# Patient Record
Sex: FEMALE | Race: WHITE | NOT HISPANIC OR LATINO | Employment: FULL TIME | URBAN - METROPOLITAN AREA
[De-identification: names, ages, dates, MRNs, and addresses within clinical notes are randomized per-mention and may not be internally consistent; named-entity substitution may affect disease eponyms.]

---

## 2017-01-09 ENCOUNTER — THERAPY VISIT (OUTPATIENT)
Dept: PHYSICAL THERAPY | Facility: CLINIC | Age: 57
End: 2017-01-09
Payer: COMMERCIAL

## 2017-01-09 DIAGNOSIS — M25.561 CHRONIC PAIN OF RIGHT KNEE: Primary | ICD-10-CM

## 2017-01-09 DIAGNOSIS — G89.29 CHRONIC PAIN OF RIGHT KNEE: Primary | ICD-10-CM

## 2017-01-09 PROCEDURE — 97110 THERAPEUTIC EXERCISES: CPT | Mod: GP | Performed by: PHYSICAL THERAPIST

## 2017-01-09 PROCEDURE — 97161 PT EVAL LOW COMPLEX 20 MIN: CPT | Mod: GP | Performed by: PHYSICAL THERAPIST

## 2017-01-09 NOTE — PROGRESS NOTES
"Subjective:    Rupal Puri is a 56 year old female with a right knee condition.  Condition occurred with:  Repetition/overuse.  Condition occurred: in the community.  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 Pt has noted increased pain 12-16..        Pain is described as sharp  and reported as 2/10.  Associated symptoms:  Loss of strength (clicking). Pain is the same all the time.  Symptoms are exacerbated by kneeling, bending/squatting, ascending stairs, descending stairs and transfers and relieved by rest.  Since onset symptoms are unchanged.  Special tests:  X-ray (pt reports x-rays negative).  Previous treatment: none to the knee.    General health as reported by patient is good.  Pertinent medical history includes:  Overweight, migraines and rheumatoid arthritis.  Medical allergies: no.  Other surgeries include:  Orthopedic surgery (knees).  Medication history: maxalt.  Current occupation is .  Patient is working in normal job without restrictions.  Primary job tasks include:  Prolonged sitting.        Red flags:  None as reported by the patient.                      Objective:          Flexibility/Screens:       Lower Extremity:      Decreased right lower extremity flexibility:  IT Band                                                      Knee Evaluation:  ROM:  Strength wnl knee: weak pelvic stabilizers.  AROM    Hyperextension: Left:     Right: 0  Extension: Left:    Right:  0  Flexion: Left:   Right: 120  PROM    Hyperextension: Left:   Right:  0  Extension: Left:   Right:  0  Flexion: Left:   Right:  130      Strength:     Extension:  Right: 4/5   Pain:  Flexion:  Right: 5/5   Pain:              Palpation:  Palpation of knee: point tenderness distal ITB.                General     ROS    Assessment/Plan:      Patient is a 56 year old female with right side knee complaints.    Patient has the following " significant findings with corresponding treatment plan.                Diagnosis 1:  Right knee pain / ITB syndrome  Pain -  hot/cold therapy, manual therapy, self management, education and home program  Decreased ROM/flexibility - manual therapy, therapeutic exercise, therapeutic activity and home program  Decreased strength - therapeutic exercise, therapeutic activities and home program    Therapy Evaluation Codes:   1) History comprised of:   Personal factors that impact the plan of care:      Time since onset of symptoms.    Comorbidity factors that impact the plan of care are:      Migraines/headaches.     Medications impacting care: migraine medication.  2) Examination of Body Systems comprised of:   Body structures and functions that impact the plan of care:      Hip and Knee.   Activity limitations that impact the plan of care are:      Driving, Squatting/kneeling and Standing.  3) Clinical presentation characteristics are:   Stable/Uncomplicated.  4) Decision-Making    Low complexity using standardized patient assessment instrument and/or measureable assessment of functional outcome.  Cumulative Therapy Evaluation is: Low complexity.    Previous and current functional limitations:  (See Goal Flow Sheet for this information)    Short term and Long term goals: (See Goal Flow Sheet for this information)     Communication ability:  Patient appears to be able to clearly communicate and understand verbal and written communication and follow directions correctly.  Treatment Explanation - The following has been discussed with the patient:   RX ordered/plan of care  Anticipated outcomes  Possible risks and side effects  This patient would benefit from PT intervention to resume normal activities.   Rehab potential is good.    Frequency:  1 X week, once daily  Duration:  for 6 weeks  Discharge Plan:  Achieve all LTG.  Independent in home treatment program.  Reach maximal therapeutic benefit.    Please refer to the  daily flowsheet for treatment today, total treatment time and time spent performing 1:1 timed codes.

## 2017-01-09 NOTE — Clinical Note
"[facility]  [address] [facility] [address]  [facility] [address]  [phone]    2017    Re: Rupal Puri   :   1960  MRN:  [id number]   REFERRING PHYSICIAN:   Simon KUMARI PT  Date of Initial Evaluation:  17  Visits:  Rxs Used: 1  Reason for Referral:  Chronic pain of right knee    EVALUATION SUMMARY    Subjective:    Rupal Puri is a 56 year old female with a right knee condition.  Condition occurred with:  Repetition/overuse.  Condition occurred: in the community.  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 Pt has noted increased pain 12-16..      Pain is described as sharp  and reported as 2/10.  Associated symptoms:  Loss of strength (clicking). Pain is the same all the time.  Symptoms are exacerbated by kneeling, bending/squatting, ascending stairs, descending stairs and transfers and relieved by rest.  Since onset symptoms are unchanged.  Special tests:  X-ray (pt reports x-rays negative).  Previous treatment: none to the knee.    General health as reported by patient is good.  Pertinent medical history includes:  Overweight, migraines and rheumatoid arthritis.  Medical allergies: no.  Other surgeries include:  Orthopedic surgery (knees).  Medication history: maxalt.  Current occupation is .  Patient is working in normal job without restrictions.  Primary job tasks include:  Prolonged sitting.  Red flags:  None as reported by the patient.                Objective:  Flexibility/Screens:   Lower Extremity:  Decreased right lower extremity flexibility:  IT Band    Knee Evaluation:  ROM:  Strength wnl knee: weak pelvic stabilizers.  AROM  Hyperextension: Left:     Right: 0  Extension: Left:    Right:  0  Flexion: Left:   Right: 120  PROM  Hyperextension: Left:   Right:  0  Extension: Left:   Right:  0  Flexion: Left:   Right:  130  Strength: "   Extension:  Right: 4/5   Pain:  Flexion:  Right: 5/5   Pain:    Palpation:  Palpation of knee: point tenderness distal ITB.      Re: Rupal Puri   :   1960    Assessment/Plan:      Patient is a 56 year old female with right side knee complaints.    Patient has the following significant findings with corresponding treatment plan.                Diagnosis 1:  Right knee pain / ITB syndrome  Pain -  hot/cold therapy, manual therapy, self management, education and home program  Decreased ROM/flexibility - manual therapy, therapeutic exercise, therapeutic activity and home program  Decreased strength - therapeutic exercise, therapeutic activities and home program    Therapy Evaluation Codes:   1) History comprised of:   Personal factors that impact the plan of care:      Time since onset of symptoms.    Comorbidity factors that impact the plan of care are:      Migraines/headaches.     Medications impacting care: migraine medication.  2) Examination of Body Systems comprised of:   Body structures and functions that impact the plan of care:      Hip and Knee.   Activity limitations that impact the plan of care are:      Driving, Squatting/kneeling and Standing.  3) Clinical presentation characteristics are:   Stable/Uncomplicated.  4) Decision-Making    Low complexity using standardized patient assessment instrument and/or measureable assessment of functional outcome.  Cumulative Therapy Evaluation is: Low complexity.    Previous and current functional limitations:  (See Goal Flow Sheet for this information)    Short term and Long term goals: (See Goal Flow Sheet for this information)   Communication ability:  Patient appears to be able to clearly communicate and understand verbal and written communication and follow directions correctly.  Treatment Explanation - The following has been discussed with the patient:   RX ordered/plan of care  Anticipated outcomes  Possible risks and side effects  This patient  would benefit from PT intervention to resume normal activities.   Rehab potential is good.  Frequency:  1 X week, once daily  Duration:  for 6 weeks  Discharge Plan:  Achieve all LTG.  Independent in home treatment program.  Reach maximal therapeutic benefit.    Thank you for your referral.  INQUIRIES  Therapist:  Lakhwinder Galindo, PT  [facility]  [address] [address] [address]  Phone: [phone]/Fax: [phone]

## 2017-01-17 ENCOUNTER — THERAPY VISIT (OUTPATIENT)
Dept: PHYSICAL THERAPY | Facility: CLINIC | Age: 57
End: 2017-01-17
Payer: COMMERCIAL

## 2017-01-17 DIAGNOSIS — M25.561 CHRONIC PAIN OF RIGHT KNEE: Primary | ICD-10-CM

## 2017-01-17 DIAGNOSIS — G89.29 CHRONIC PAIN OF RIGHT KNEE: Primary | ICD-10-CM

## 2017-01-17 PROCEDURE — 97530 THERAPEUTIC ACTIVITIES: CPT | Mod: GP | Performed by: PHYSICAL THERAPIST

## 2017-01-17 PROCEDURE — 97110 THERAPEUTIC EXERCISES: CPT | Mod: GP | Performed by: PHYSICAL THERAPIST

## 2017-01-24 ENCOUNTER — THERAPY VISIT (OUTPATIENT)
Dept: PHYSICAL THERAPY | Facility: CLINIC | Age: 57
End: 2017-01-24
Payer: COMMERCIAL

## 2017-01-24 DIAGNOSIS — G89.29 CHRONIC PAIN OF RIGHT KNEE: Primary | ICD-10-CM

## 2017-01-24 DIAGNOSIS — M25.561 CHRONIC PAIN OF RIGHT KNEE: Primary | ICD-10-CM

## 2017-01-24 PROCEDURE — 97140 MANUAL THERAPY 1/> REGIONS: CPT | Mod: GP | Performed by: PHYSICAL THERAPIST

## 2017-01-24 PROCEDURE — 97110 THERAPEUTIC EXERCISES: CPT | Mod: GP | Performed by: PHYSICAL THERAPIST

## 2017-01-25 NOTE — PROGRESS NOTES
Subjective:    HPI                    Objective:    System    Physical Exam    General     ROS    Assessment/Plan:      SUBJECTIVE  Subjective changes as noted by pt:  Pt notes decreased pain with activity but still notes crepitation     Current pain level: 2/10 Current Pain level: 2/10   Changes in function:  Pt notes increased ease rising from a chair and car transfers.      Adverse reaction to treatment or activity:  None    OBJECTIVE  Changes in objective findings:  Improved ITB flexibility and pelvic stability improved        ASSESSMENT  Rupal continues to require intervention to meet STG and LTG's: PT  Patient's symptoms are resolving.  Response to therapy has shown an improvement in  flexibility, strength and function  Progress made towards STG/LTG?  Yes,     PLAN  Current treatment program is being advanced to more complex exercises.    PTA/ATC plan:  N/A    Please refer to the daily flowsheet for treatment today, total treatment time and time spent performing 1:1 timed codes.

## 2017-01-31 ENCOUNTER — THERAPY VISIT (OUTPATIENT)
Dept: PHYSICAL THERAPY | Facility: CLINIC | Age: 57
End: 2017-01-31
Payer: COMMERCIAL

## 2017-01-31 DIAGNOSIS — M25.561 CHRONIC PAIN OF RIGHT KNEE: Primary | ICD-10-CM

## 2017-01-31 DIAGNOSIS — G89.29 CHRONIC PAIN OF RIGHT KNEE: Primary | ICD-10-CM

## 2017-01-31 PROCEDURE — 97110 THERAPEUTIC EXERCISES: CPT | Mod: GP | Performed by: PHYSICAL THERAPIST

## 2017-01-31 PROCEDURE — 97140 MANUAL THERAPY 1/> REGIONS: CPT | Mod: GP | Performed by: PHYSICAL THERAPIST

## 2017-02-01 NOTE — PROGRESS NOTES
Subjective:    HPI                    Objective:    System    Physical Exam    General     ROS    Assessment/Plan:      SUBJECTIVE  Subjective changes as noted by pt:  Pt notes increased strength. Pt still notes tissue feels tight lateral aspect of the knee     Current pain level: 2/10 Current Pain level: 2/10   Changes in function:  Pt notes increased ease with ambulation and transfers     Adverse reaction to treatment or activity:  None    OBJECTIVE  Changes in objective findings:  Improved quad control with knee bends, hip abductor strength improved. Improved tissue mobility distal ITB        ASSESSMENT  Rupal continues to require intervention to meet STG and LTG's: PT  Patient's symptoms are resolving.  Response to therapy has shown an improvement in  strength and function  Progress made towards STG/LTG?  Yes,     PLAN  Current treatment program is being advanced to more complex exercises.    PTA/ATC plan:  N/A    Please refer to the daily flowsheet for treatment today, total treatment time and time spent performing 1:1 timed codes.

## 2017-02-13 ENCOUNTER — THERAPY VISIT (OUTPATIENT)
Dept: PHYSICAL THERAPY | Facility: CLINIC | Age: 57
End: 2017-02-13
Payer: COMMERCIAL

## 2017-02-13 DIAGNOSIS — G89.29 CHRONIC PAIN OF RIGHT KNEE: ICD-10-CM

## 2017-02-13 DIAGNOSIS — M25.561 CHRONIC PAIN OF RIGHT KNEE: ICD-10-CM

## 2017-02-13 PROCEDURE — 97530 THERAPEUTIC ACTIVITIES: CPT | Mod: GP | Performed by: PHYSICAL THERAPIST

## 2017-02-13 PROCEDURE — 97110 THERAPEUTIC EXERCISES: CPT | Mod: GP | Performed by: PHYSICAL THERAPIST

## 2017-02-13 NOTE — PROGRESS NOTES
Subjective:    HPI                    Objective:    System    Physical Exam    General     ROS    Assessment/Plan:      SUBJECTIVE  Subjective changes as noted by pt:  Pt still notes intermittent clicking in the knee     Current pain level: 2/10     Changes in function:  Pt notes pain and clicking with stairs and rising from a chair.      Adverse reaction to treatment or activity:  None    OBJECTIVE  Changes in objective findings:  Trial of right patellar taping to correct lateral glide and tilt        ASSESSMENT  Rupal continues to require intervention to meet STG and LTG's: PT  Patient is not progressing as expected.  Response to therapy has shown lack of progress in  crepitation  Progress made towards STG/LTG?  Pt progressing slowly towards goals    PLAN  Current treatment program is being advanced to more complex exercises.  Trial of patellar taping    PTA/ATC plan:  N/A    Please refer to the daily flowsheet for treatment today, total treatment time and time spent performing 1:1 timed codes.

## 2017-02-13 NOTE — MR AVS SNAPSHOT
After Visit Summary   2/13/2017    Rupal Puri    MRN: [id number]           Patient Information     Date Of Birth          1960        Visit Information        Provider Department      2/13/2017 5:40 PM Lakhwinder Galindo, PT [facility]        Today's Diagnoses     Chronic pain of right knee           Follow-ups after your visit        Your next 10 appointments already scheduled     Feb 24, 2017  4:00 PM CST   [facility] Extremity with Lakhwinder Galindo PT   [facility] ([facility]  )    [address] [address] [address]   [phone]              Who to contact     If you have questions or need follow up information about today's clinic visit or your schedule please contact [facility] directly at [phone].  Normal or non-critical lab and imaging results will be communicated to you by MyChart, letter or phone within 4 business days after the clinic has received the results. If you do not hear from us within 7 days, please contact the clinic through MyChart or phone. If you have a critical or abnormal lab result, we will notify you by phone as soon as possible.  Submit refill requests through [employer] or call your pharmacy and they will forward the refill request to us. Please allow 3 business days for your refill to be completed.          Additional Information About Your Visit        MyChart Information     [employer] gives you secure access to your electronic health record. If you see a primary care provider, you can also send messages to your care team and make appointments. If you have questions, please call your primary care clinic.  If you do not have a primary care provider, please call [phone] and they will assist you.        Care EveryWhere ID     This is your Care EveryWhere ID. This could be used by other organizations to access your [place] medical records  [id number]        Your Vitals Were     Last Period                   03/10/2013             Blood Pressure from Last 3 Encounters:   11/30/16 122/76   07/07/16 130/84   05/17/16 138/84    Weight from Last 3 Encounters:   11/30/16 130.2 kg (287 lb)   07/07/16 (!) 137.2 kg (302 lb 6.4 oz)   05/16/16 135.6 kg (299 lb)              We Performed the Following     Therapeutic Activities     Therapeutic Exercises        Primary Care Provider Office Phone # Fax #    PAUL Smart [phone]       [facility]  [address]  [facility] [address]        Thank you!     Thank you for choosing [facility]  for your care. Our goal is always to provide you with excellent care. Hearing back from our patients is one way we can continue to improve our services. Please take a few minutes to complete the written survey that you may receive in the mail after your visit with us. Thank you!             Your Updated Medication List - Protect others around you: Learn how to safely use, store and throw away your medicines at www.disposemymeds.org.          This list is accurate as of: 2/13/17  6:55 PM.  Always use your most recent med list.                   Brand Name Dispense Instructions for use    ACETAMINOPHEN PO          * methylPREDNISolone 4 MG tablet    MEDROL DOSEPAK     TAKE 6 TABLETS ON DAY 1 AS DIRECTED ON PACKAGE AND DECREASE BY 1 TAB EACH DAY FOR A TOTAL OF 6 DAYS       * methylPREDNISolone 4 MG tablet    MEDROL DOSEPAK    21 tablet    Follow package instructions       rizatriptan 10 MG ODT tab    MAXALT-MLT    6 tablet    DISSOLVE 1 TABLET ON TONGUE AT ONSET OF HEADACHE. MAY REPEAT IN 2 HOURS. MAX 3 TABLETS PER DAY       * Notice:  This list has 2 medication(s) that are the same as other medications prescribed for you. Read the directions carefully, and ask your doctor or other care provider to review them with you.

## 2017-03-01 ENCOUNTER — OFFICE VISIT (OUTPATIENT)
Dept: FAMILY MEDICINE | Facility: CLINIC | Age: 57
End: 2017-03-01

## 2017-03-01 VITALS
TEMPERATURE: 98.2 F | OXYGEN SATURATION: 98 % | WEIGHT: 130.2 LBS | DIASTOLIC BLOOD PRESSURE: 80 MMHG | BODY MASS INDEX: 20.93 KG/M2 | HEIGHT: 66 IN | SYSTOLIC BLOOD PRESSURE: 132 MMHG | HEART RATE: 74 BPM

## 2017-03-01 DIAGNOSIS — L08.9 SKIN INFECTION: Primary | ICD-10-CM

## 2017-03-01 DIAGNOSIS — M54.50 ACUTE BILATERAL LOW BACK PAIN WITHOUT SCIATICA: ICD-10-CM

## 2017-03-01 PROCEDURE — 99213 OFFICE O/P EST LOW 20 MIN: CPT | Performed by: PHYSICIAN ASSISTANT

## 2017-03-01 RX ORDER — CEPHALEXIN 500 MG/1
500 CAPSULE ORAL 4 TIMES DAILY
Qty: 40 CAPSULE | Refills: 0 | Status: SHIPPED | OUTPATIENT
Start: 2017-03-01 | End: 2017-06-23

## 2017-03-01 NOTE — MR AVS SNAPSHOT
After Visit Summary   3/1/2017    Rupal Puri    MRN: [id number]           Patient Information     Date Of Birth          1960        Visit Information        Provider Department      3/1/2017 2:30 PM Denia Salmoon PA [facility]        Today's Diagnoses     Skin infection    -  1    Acute bilateral low back pain without sciatica           Follow-ups after your visit        Additional Services     [facility] PT, HAND, AND CHIROPRACTIC REFERRAL       **This order will print in the [facility] Scheduling Office**    Physical Therapy, Hand Therapy and Chiropractic Care are available through:    *[facility]  *[facility]  *[facility]    Call one number to schedule at any of the above locations: [phone].    Your provider has referred you to: Physical Therapy at [facility] or [facility]    Indication/Reason for Referral: Low Back Pain  Onset of Illness: 1 week ago  Therapy Orders: Evaluate and Treat  Special Programs: None  Special Request: None    Abdias Calero      Additional Comments for the Therapist or Chiropractor: none    Please be aware that coverage of these services is subject to the terms and limitations of your health insurance plan.  Call member services at your health plan with any benefit or coverage questions.      Please bring the following to your appointment:    *Your personal calendar for scheduling future appointments  *Comfortable clothing                  Your next 10 appointments already scheduled     Mar 03, 2017  5:20 PM CST   [facility] Extremity with Lakhwinder Galindo, PT   [facility] ([facility]  )    [address] [facility]  [address] [place] [address]   [phone]              Who to contact     If you have questions or need follow up information about today's clinic visit or your schedule please contact [facility] directly at [phone].  Normal or non-critical lab  "and imaging results will be communicated to you by MyChart, letter or phone within 4 business days after the clinic has received the results. If you do not hear from us within 7 days, please contact the clinic through [employer] or phone. If you have a critical or abnormal lab result, we will notify you by phone as soon as possible.  Submit refill requests through [employer] or call your pharmacy and they will forward the refill request to us. Please allow 3 business days for your refill to be completed.          Additional Information About Your Visit        [email or web address] Information     [employer] gives you secure access to your electronic health record. If you see a primary care provider, you can also send messages to your care team and make appointments. If you have questions, please call your primary care clinic.  If you do not have a primary care provider, please call [phone] and they will assist you.        Care EveryWhere ID     This is your Care EveryWhere ID. This could be used by other organizations to access your [place] medical records  [id number]        Your Vitals Were     Pulse Temperature Height Last Period Pulse Oximetry BMI (Body Mass Index)    74 98.2  F (36.8  C) (Oral) 1.67 m (5' 5.75\") 03/10/2013 98% 21.18 kg/m2       Blood Pressure from Last 3 Encounters:   03/01/17 132/80   11/30/16 122/76   07/07/16 130/84    Weight from Last 3 Encounters:   03/01/17 59.1 kg (130 lb 3.2 oz)   11/30/16 130.2 kg (287 lb)   07/07/16 (!) 137.2 kg (302 lb 6.4 oz)              We Performed the Following     [facility] PT, HAND, AND CHIROPRACTIC REFERRAL          Today's Medication Changes          These changes are accurate as of: 3/1/17  3:23 PM.  If you have any questions, ask your nurse or doctor.               Start taking these medicines.        Dose/Directions    cephALEXin 500 MG capsule   Commonly known as:  KEFLEX   Used for:  Skin infection   Started by:  Denia Salomon PA        Dose:  500 mg   Take 1 " capsule (500 mg) by mouth 4 times daily   Quantity:  40 capsule   Refills:  0            Where to get your medicines      These medications were sent to [facility]/pharmacy #[id number] - [place], [place] - [address]  [address], [place] [address]     Phone:  [phone]     cephALEXin 500 MG capsule                Primary Care Provider Office Phone # Fax #    PAUL Smart [phone]       [facility]  [address] [facility] [address]        Thank you!     Thank you for choosing [facility]  for your care. Our goal is always to provide you with excellent care. Hearing back from our patients is one way we can continue to improve our services. Please take a few minutes to complete the written survey that you may receive in the mail after your visit with us. Thank you!             Your Updated Medication List - Protect others around you: Learn how to safely use, store and throw away your medicines at www.disposemymeds.org.          This list is accurate as of: 3/1/17  3:23 PM.  Always use your most recent med list.                   Brand Name Dispense Instructions for use    ACETAMINOPHEN PO          cephALEXin 500 MG capsule    KEFLEX    40 capsule    Take 1 capsule (500 mg) by mouth 4 times daily       * methylPREDNISolone 4 MG tablet    MEDROL DOSEPAK     TAKE 6 TABLETS ON DAY 1 AS DIRECTED ON PACKAGE AND DECREASE BY 1 TAB EACH DAY FOR A TOTAL OF 6 DAYS       * methylPREDNISolone 4 MG tablet    MEDROL DOSEPAK    21 tablet    Follow package instructions       rizatriptan 10 MG ODT tab    MAXALT-MLT    6 tablet    DISSOLVE 1 TABLET ON TONGUE AT ONSET OF HEADACHE. MAY REPEAT IN 2 HOURS. MAX 3 TABLETS PER DAY       * Notice:  This list has 2 medication(s) that are the same as other medications prescribed for you. Read the directions carefully, and ask your doctor or other care provider to review them with you.

## 2017-03-01 NOTE — PROGRESS NOTES
"SUBJECTIVE:                                                    Rupal Puri is a 56 year old female who presents to clinic today for the following health issues:    1. Sunday am at gym, bending with kettle ball  Felt pain in mid right buttock that radiated down right leg.  Now has pain low left buttock as well.  Denies bowel/bladder incont.      2.  Acne - has had intermittent spots on chest for a few months.  using for clindamycin topical that was previously prescribed for cyst under breast.  Has \"calmed\" down spots but they are still present  No new exposures known.           Labs reviewed in EPIC  BP Readings from Last 3 Encounters:   03/01/17 132/80   11/30/16 122/76   07/07/16 130/84    Wt Readings from Last 3 Encounters:   03/01/17 59.1 kg (130 lb 3.2 oz)   11/30/16 130.2 kg (287 lb)   07/07/16 (!) 137.2 kg (302 lb 6.4 oz)                  Patient Active Problem List   Diagnosis     Migraine     CARDIOVASCULAR SCREENING; LDL GOAL LESS THAN 130     Family history of ischemic heart disease     Family history of diabetes mellitus     Mixed hyperlipidemia     Abnormal glucose     Metabolic syndrome     Cough variant asthma     Health Care Home     Arthritis of knee     Knee pain     Cellulitis     GI bleed     ACP (advance care planning)     Abnormal cardiovascular stress test     Chest pain     Morbid obesity due to excess calories (H)     Shoulder pain     Lateral epicondylitis     Chronic pain of right knee     Past Surgical History   Procedure Laterality Date     C nonspecific procedure       hernia repair femoral     C nonspecific procedure       s/p tonsillectomy     Hernia repair       Arthroplasty knee  4/23/2013     Procedure: ARTHROPLASTY KNEE;  RIGHT TOTAL KNEE ARTHROPLASTY (IFEOMA)^;  Surgeon: Simon Pitts MD;  Location:  OR     Arthroplasty knee  7/17/2014     Procedure: ARTHROPLASTY KNEE;  Surgeon: Simon Pitts MD;  Location:  OR     Esophagoscopy, gastroscopy, duodenoscopy (egd), " combined  7/22/2014     Procedure: COMBINED ESOPHAGOSCOPY, GASTROSCOPY, DUODENOSCOPY (EGD), BIOPSY SINGLE OR MULTIPLE;  Surgeon: Natalie Gibbons MD;  Location:  GI       Social History   Substance Use Topics     Smoking status: Former Smoker     Packs/day: 1.00     Years: 5.00     Quit date: 3/20/1988     Smokeless tobacco: Never Used     Alcohol use 0.5 oz/week     1 drink(s) per week      Comment: rare     Family History   Problem Relation Age of Onset     CANCER Father      kidney     C.A.D. Father      early disease     OSTEOPOROSIS Mother      Neurologic Disorder Maternal Grandmother      TIA's     Lipids Brother      DIABETES Maternal Grandmother      Cancer - colorectal No family hx of      Breast Cancer No family hx of      Anesthesia Reaction No family hx of      Crohn Disease No family hx of      Ulcerative Colitis No family hx of      Colon Polyps No family hx of      Gout Father          Current Outpatient Prescriptions   Medication Sig Dispense Refill     cephALEXin (KEFLEX) 500 MG capsule Take 1 capsule (500 mg) by mouth 4 times daily 40 capsule 0     methylPREDNISolone (MEDROL DOSEPAK) 4 MG tablet TAKE 6 TABLETS ON DAY 1 AS DIRECTED ON PACKAGE AND DECREASE BY 1 TAB EACH DAY FOR A TOTAL OF 6 DAYS  0     methylPREDNISolone (MEDROL DOSEPAK) 4 MG tablet Follow package instructions 21 tablet 0     rizatriptan (MAXALT-MLT) 10 MG disintegrating tablet DISSOLVE 1 TABLET ON TONGUE AT ONSET OF HEADACHE. MAY REPEAT IN 2 HOURS. MAX 3 TABLETS PER DAY 6 tablet 11     ACETAMINOPHEN PO        Allergies   Allergen Reactions     Ibuprofen GI Disturbance     2 days of gi upset after taking any NSAID     Recent Labs   Lab Test  05/17/16   1011  04/30/16   1030  01/14/16   1926  07/22/14   0640   04/17/13   1307 12/20/10   A1C   --    --    --    --    --   5.9   --    LDL  112*   --    --    --    --    --   102   HDL  38*   --    --    --    --    --   34*   TRIG  168*   --    --    --    --    --   214*   ALT  29  " 46   --    --    --    --   27   CR   --   0.68  0.84  0.92   < >   --   0.78   GFRESTIMATED   --   89  79  64   < >   --   >60   GFRESTBLACK   --   >90   GFR Calc     --   77   < >   --    --    POTASSIUM   --   4.5  4.5  3.6   < >   --   4.6   TSH  1.28   --    --    --    --    --    --     < > = values in this interval not displayed.            OBJECTIVE:                                                    /80 (BP Location: Right arm, Patient Position: Chair, Cuff Size: Adult Regular)  Pulse 74  Temp 98.2  F (36.8  C) (Oral)  Ht 1.67 m (5' 5.75\")  Wt 59.1 kg (130 lb 3.2 oz)  LMP 03/10/2013  SpO2 98%  BMI 21.18 kg/m2   Body mass index is 21.18 kg/(m^2).       Gait is normal.     Back:  Skin without ecchymosis, erythema or swelling.  Thoracic and lumbar spine non-tender to palpation.  There is tenderness over the piriformis/gluteus on the right.   SI Joints:  Right and left SI joints non-tender  ROM:  decreased flexion, extension, rotation, lateral bending  Strength - Full strength hip flexors, knee extensors/flexors and ankles  Normal sensation in legs to light tough bilaterally  SLR Negative bilaterally   Patellar reflexes absent bilaterally    Skin: on chest and behind right ear are discrete 2 mm erythematous papules         ASSESSMENT/PLAN:                                                        (L08.9) Skin infection  (primary encounter diagnosis)  Plan: cephALEXin (KEFLEX) 500 MG capsule    PO tx for skin infection - if not clearing, see derm for bx.     (M54.5) Acute bilateral low back pain without sciatica  Plan: [facility], AND CHIROPRACTIC REFERRAL  Icing advised.         Risks, benefits and alternatives of treatments discussed. Plan agreed on.      Follow up with Provider - PAUL Lau  [facility]    "

## 2017-03-01 NOTE — NURSING NOTE
Rupal Puri is here for RT leg trauma on Freddy morning 02/26/2017 Pt stated that she was working out and lifting weight and doing exercise on the Rt Leg and when lifting the weight she feels a muscle in her RT leg pull and go from the center of the buttocks done to RT leg down to the foot. Pt also stated that it feels like the RT leg will go out when walking that its feels loose. Pt would like a referral for therapy     Pre-Visit Screening :  Immunizations : up to date    Colonoscopy : is up to date  Mammogram : is up to date  Asthma Action Test/Plan : NA  PHQ9/GAD7 :  NA

## 2017-03-03 ENCOUNTER — THERAPY VISIT (OUTPATIENT)
Dept: PHYSICAL THERAPY | Facility: CLINIC | Age: 57
End: 2017-03-03
Payer: COMMERCIAL

## 2017-03-03 DIAGNOSIS — M25.551 BILATERAL HIP PAIN: Primary | ICD-10-CM

## 2017-03-03 DIAGNOSIS — M25.552 BILATERAL HIP PAIN: Primary | ICD-10-CM

## 2017-03-03 PROCEDURE — 97110 THERAPEUTIC EXERCISES: CPT | Mod: GP | Performed by: PHYSICAL THERAPIST

## 2017-03-03 PROCEDURE — 97162 PT EVAL MOD COMPLEX 30 MIN: CPT | Mod: GP | Performed by: PHYSICAL THERAPIST

## 2017-03-03 ASSESSMENT — ACTIVITIES OF DAILY LIVING (ADL)
GOING_UP_1_FLIGHT_OF_STAIRS: MODERATE DIFFICULTY
WALKING_INITIALLY: MODERATE DIFFICULTY
HOS_ADL_COUNT: 12
HOS_ADL_ITEM_SCORE_TOTAL: 20
SITTING_FOR_15_MINUTES: MODERATE DIFFICULTY
RECREATIONAL_ACTIVITIES: EXTREME DIFFICULTY
HOW_WOULD_YOU_RATE_YOUR_CURRENT_LEVEL_OF_FUNCTION_DURING_YOUR_USUAL_ACTIVITIES_OF_DAILY_LIVING_FROM_0_TO_100_WITH_100_BEING_YOUR_LEVEL_OF_FUNCTION_PRIOR_TO_YOUR_HIP_PROBLEM_AND_0_BEING_THE_INABILITY_TO_PERFORM_ANY_OF_YOUR_USUAL_DAILY_ACTIVITIES?: 50
STANDING_FOR_15_MINUTES: EXTREME DIFFICULTY
HEAVY_WORK: EXTREME DIFFICULTY
WALKING_APPROXIMATELY_10_MINUTES: MODERATE DIFFICULTY
ROLLING_OVER_IN_BED: MODERATE DIFFICULTY
PUTTING_ON_SOCKS_AND_SHOES: EXTREME DIFFICULTY
GETTING_INTO_AND_OUT_OF_AN_AVERAGE_CAR: MODERATE DIFFICULTY
LIGHT_TO_MODERATE_WORK: MODERATE DIFFICULTY
HOS_ADL_HIGHEST_POTENTIAL_SCORE: 48
TWISTING/PIVOTING_ON_INVOLVED_LEG: EXTREME DIFFICULTY
GOING_DOWN_1_FLIGHT_OF_STAIRS: MODERATE DIFFICULTY
STEPPING_UP_AND_DOWN_CURBS: MODERATE DIFFICULTY

## 2017-03-03 NOTE — PROGRESS NOTES
Subjective:    Rupal Puri is a 56 year old female with a bilateral hips condition.  Condition occurred with:  A twist (lifting).  Condition occurred: in the community.  This is a new condition  2-24-17 pt injured left hip pivoting and twisting on the left leg. 2-26-17 pt injured right hip performing a single leg dead lift with a kettle bell. Pt referred by MD for therapy on 3-1-17.        Pain is described as sharp, shooting, stabbing, aching, burning and cramping and is constant and reported as 6/10.  Associated symptoms:  Loss of motion/stiffness and loss of strength. Pain is worse in the P.M..  Symptoms are exacerbated by walking, sitting, descending stairs and ascending stairs (driving) and relieved by ice, rest and analgesics.  Since onset symptoms are gradually worsening.  Special testing: none.  Previous treatment: none for the hips.      Pertinent medical history includes:  Migraines and overweight.  Medical allergies: no.  Other surgeries include:  Orthopedic surgery (bilateral knees).  Medication history: maxalt.  Current occupation is Desk worker.    Primary job tasks include:  Prolonged sitting.        Red flags:  None as reported by the patient.                      Objective:      Gait:  Decreased stride length, decreased pelvic stability        Flexibility/Screens:       Lower Extremity:  Decreased left lower extremity flexibility:Hip Flexors; IT Band and Hamstrings    Decreased right lower extremity flexibility:  IT Band and Hamstrings                                                 Hip Evaluation    Hip Strength:    Flexion:   Left: 4-/5   Pain:  Right: 4/5   Pain:                    Extension:  Left: 4/5  Pain:Right: 4-/5     Pain: weak/painful    Abduction:  Left: 4-/5      Pain:weak/painfulRight: 4-/5     Pain:weak/painful  Adduction:  Left: 4-/5     Pain:weak/painfulRight: 4-/5    Pain:weak/painful  Internal Rotation:  Left: 4/5    Pain:Right: 4/5   Pain:  External Rotation:  Left: 4-/5     Pain: weak/painful  Right: 4-/5    Pain: weak/painful  Knee Flexion:  Left: 4/5   Pain:Right: 4-/5    Pain: weak/painful  Knee Extension:  Left: 4+/5   Pain:Right: 4+/5    Pain:        Hip Special Testing:      Left hip negative for the following special tests:  SLR   Right hip positive for the following special tests:  SLR    Hip Palpation:  Palpations normal left hip: right hamstring.  Left hip tenderness present at:   IT Band and hip flexors    Right hip tenderness not present at:  IT Band or hip flexors             General     ROS    Assessment/Plan:      Patient is a 56 year old female with both sides hip complaints.    Patient has the following significant findings with corresponding treatment plan.                Diagnosis 1:  Right hamstring strain/ left hip flexor strain  Pain -  hot/cold therapy, manual therapy, self management, education and home program  Decreased ROM/flexibility - manual therapy, therapeutic exercise, therapeutic activity and home program  Decreased strength - therapeutic exercise, therapeutic activities and home program    Therapy Evaluation Codes:   1) History comprised of:   Personal factors that impact the plan of care:      Anxiety.    Comorbidity factors that impact the plan of care are:      Migraines/headaches, Overweight and Weakness.     Medications impacting care: maxalt.  2) Examination of Body Systems comprised of:   Body structures and functions that impact the plan of care:      Hip, Knee and Lumbar spine.   Activity limitations that impact the plan of care are:      Bathing, Bending, Driving, Dressing, Jumping, Lifting, Running, Sitting, Sports, Squatting/kneeling, Stairs, Standing, Walking and Working.  3) Clinical presentation characteristics are:   Evolving/Changing.  4) Decision-Making    Moderate complexity using standardized patient assessment instrument and/or measureable assessment of functional outcome.  Cumulative Therapy Evaluation is: Moderate  complexity.    Previous and current functional limitations:  (See Goal Flow Sheet for this information)    Short term and Long term goals: (See Goal Flow Sheet for this information)     Communication ability:  Patient appears to be able to clearly communicate and understand verbal and written communication and follow directions correctly.  Treatment Explanation - The following has been discussed with the patient:   RX ordered/plan of care  Anticipated outcomes  Possible risks and side effects  This patient would benefit from PT intervention to resume normal activities.   Rehab potential is good.    Frequency:  2 X week, once daily  Duration:  for 4 weeks  Discharge Plan:  Achieve all LTG.  Independent in home treatment program.  Reach maximal therapeutic benefit.    Please refer to the daily flowsheet for treatment today, total treatment time and time spent performing 1:1 timed codes.

## 2017-03-03 NOTE — MR AVS SNAPSHOT
After Visit Summary   3/3/2017    Rupal Puri    MRN: [id number]           Patient Information     Date Of Birth          1960        Visit Information        Provider Department      3/3/2017 5:20 PM Lakhwinder Galindo, PT [facility]        Today's Diagnoses     Bilateral hip pain    -  1       Follow-ups after your visit        Your next 10 appointments already scheduled     Mar 06, 2017  3:35 PM CST   [facility] with Chrissy Hilton, PT   [facility] ([facility] [place]  )    [id number] [place] [address]   [phone]            Mar 10, 2017  4:00 PM CST   [facility] with Lakhwinder Galindo, PT   [facility] ([facility] [place]  )    [address] [place] [address]   [phone]            Mar 14, 2017  4:40 PM CDT   [facility] with Lakhwinder Galindo, PT   [facility] ([facility] [place]  )    [address] [place] [address]   [phone]            Mar 17, 2017  4:40 PM CDT   [facility] with Lakhwinder Galindo, PT   [facility] ([facility] [place]  )    [address] [place] [address]   [phone]            Mar 21, 2017  3:20 PM CDT   [facility] with Lakhwinder Galindo, PT   [facility] ([facility] [place]  )    [address] [place] [address]   [phone]            Mar 24, 2017  3:20 PM CDT   [facility] with Lakhwinder Galindo, PT   [facility] ([facility] [place]  )    [address] [place] [address]   [phone]            Mar 28, 2017  5:20 PM CDT   [facility] with Lakhwinder Mateo, PT   [facility] ([facility] [place]  )    [address] [place] [address]   [phone]            Mar 31, 2017  4:00 PM CDT   [facility] with Lakhwinder Mateo, PT   [facility] ([facility] [place]  )    [address] [place] [address] [phone]              Who to contact     If you have questions or need follow  up information about today's clinic visit or your schedule please contact [facility] directly at [phone].  Normal or non-critical lab and imaging results will be communicated to you by MyChart, letter or phone within 4 business days after the clinic has received the results. If you do not hear from us within 7 days, please contact the clinic through [employer] or phone. If you have a critical or abnormal lab result, we will notify you by phone as soon as possible.  Submit refill requests through [employer] or call your pharmacy and they will forward the refill request to us. Please allow 3 business days for your refill to be completed.          Additional Information About Your Visit        [employer] Information     [employer] gives you secure access to your electronic health record. If you see a primary care provider, you can also send messages to your care team and make appointments. If you have questions, please call your primary care clinic.  If you do not have a primary care provider, please call [phone] and they will assist you.        Care EveryWhere ID     This is your Care EveryWhere ID. This could be used by other organizations to access your [place] medical records  [id number]        Your Vitals Were     Last Period                   03/10/2013            Blood Pressure from Last 3 Encounters:   03/01/17 132/80   11/30/16 122/76   07/07/16 130/84    Weight from Last 3 Encounters:   03/01/17 59.1 kg (130 lb 3.2 oz)   11/30/16 130.2 kg (287 lb)   07/07/16 (!) 137.2 kg (302 lb 6.4 oz)              We Performed the Following     [facility] Inital Eval Report     PT Eval, Moderate Complexity ([id number])     Therapeutic Exercises        Primary Care Provider Office Phone # Fax #    PAUL Smart [phone]       [facility]  [address]  [facility] [address]        Thank you!     Thank you for choosing [facility]  for your care. Our goal is always to  provide you with excellent care. Hearing back from our patients is one way we can continue to improve our services. Please take a few minutes to complete the written survey that you may receive in the mail after your visit with us. Thank you!             Your Updated Medication List - Protect others around you: Learn how to safely use, store and throw away your medicines at www.disposemymeds.org.          This list is accurate as of: 3/3/17  6:21 PM.  Always use your most recent med list.                   Brand Name Dispense Instructions for use    ACETAMINOPHEN PO          cephALEXin 500 MG capsule    KEFLEX    40 capsule    Take 1 capsule (500 mg) by mouth 4 times daily       * methylPREDNISolone 4 MG tablet    MEDROL DOSEPAK     TAKE 6 TABLETS ON DAY 1 AS DIRECTED ON PACKAGE AND DECREASE BY 1 TAB EACH DAY FOR A TOTAL OF 6 DAYS       * methylPREDNISolone 4 MG tablet    MEDROL DOSEPAK    21 tablet    Follow package instructions       rizatriptan 10 MG ODT tab    MAXALT-MLT    6 tablet    DISSOLVE 1 TABLET ON TONGUE AT ONSET OF HEADACHE. MAY REPEAT IN 2 HOURS. MAX 3 TABLETS PER DAY       * Notice:  This list has 2 medication(s) that are the same as other medications prescribed for you. Read the directions carefully, and ask your doctor or other care provider to review them with you.

## 2017-03-06 ENCOUNTER — THERAPY VISIT (OUTPATIENT)
Dept: PHYSICAL THERAPY | Facility: CLINIC | Age: 57
End: 2017-03-06
Payer: COMMERCIAL

## 2017-03-06 DIAGNOSIS — M25.552 BILATERAL HIP PAIN: ICD-10-CM

## 2017-03-06 DIAGNOSIS — M25.551 BILATERAL HIP PAIN: ICD-10-CM

## 2017-03-06 PROBLEM — K64.8 INTERNAL HEMORRHOIDS: Status: ACTIVE | Noted: 2017-03-06

## 2017-03-06 PROBLEM — D12.0 BENIGN NEOPLASM OF CECUM: Status: ACTIVE | Noted: 2017-03-06

## 2017-03-06 PROCEDURE — 97110 THERAPEUTIC EXERCISES: CPT | Mod: GP | Performed by: PHYSICAL THERAPIST

## 2017-03-06 PROCEDURE — 97140 MANUAL THERAPY 1/> REGIONS: CPT | Mod: GP | Performed by: PHYSICAL THERAPIST

## 2017-03-10 ENCOUNTER — THERAPY VISIT (OUTPATIENT)
Dept: PHYSICAL THERAPY | Facility: CLINIC | Age: 57
End: 2017-03-10
Payer: COMMERCIAL

## 2017-03-10 DIAGNOSIS — M25.551 BILATERAL HIP PAIN: ICD-10-CM

## 2017-03-10 DIAGNOSIS — M25.552 BILATERAL HIP PAIN: ICD-10-CM

## 2017-03-10 PROCEDURE — 97530 THERAPEUTIC ACTIVITIES: CPT | Mod: GP | Performed by: PHYSICAL THERAPIST

## 2017-03-10 PROCEDURE — 97110 THERAPEUTIC EXERCISES: CPT | Mod: GP | Performed by: PHYSICAL THERAPIST

## 2017-03-10 NOTE — PROGRESS NOTES
Subjective:    HPI                    Objective:    System    Physical Exam    General     ROS    Assessment/Plan:      SUBJECTIVE  Subjective changes as noted by pt:  Pain in right leg has moved down to the knee, pinching in left hip has decreased , more stiff at this time     Current pain level: 4/10     Changes in function:  Pt has been inconsistent with HEP. Pt notes improvement with ambulation. Pt still notes pain with prolonged sitting and standing.      Adverse reaction to treatment or activity:  None    OBJECTIVE  Changes in objective findings:  Bruising noted right distal hamstring. Pt tender to palpation right distal hamstring insertion. Pt demonstrates improved hamstring mobility        ASSESSMENT  Rupal continues to require intervention to meet STG and LTG's: PT  Patient's symptoms are resolving.  Response to therapy has shown an improvement in  pain level and function  Progress made towards STG/LTG?  Yes,     PLAN  Current treatment program is being advanced to more complex exercises.    PTA/ATC plan:  N/A    Please refer to the daily flowsheet for treatment today, total treatment time and time spent performing 1:1 timed codes.

## 2017-03-10 NOTE — MR AVS SNAPSHOT
After Visit Summary   3/10/2017    Rupal Puri    MRN: [id number]           Patient Information     Date Of Birth          1960        Visit Information        Provider Department      3/10/2017 4:00 PM Lakhwinder Galindo, PT [facility]        Today's Diagnoses     Bilateral hip pain           Follow-ups after your visit        Your next 10 appointments already scheduled     Mar 14, 2017  4:40 PM CDT   [facility] with Lakhwinder Galindo, PT   [facility] ([facility] [place]  )    [address] [place] [address]  [address] [place] [address]   [phone]            Mar 17, 2017  4:40 PM CDT   [facility] with Lakhwinder Galindo, PT   [facility] ([facility] [place]  )    [address] [place] [address]  [address] [place] [address]   [phone]            Mar 21, 2017  3:20 PM CDT   [facility] with Lakhwinder Galindo, PT   [facility] ([facility] [place]  )    [address] [place] [address]   [phone]            Mar 24, 2017  3:20 PM CDT   [facility] with Lakhwinder Galindo, PT   [facility] ([facility] [place]  )    [address] [place] [address]  [address] [place] [address]   [phone]            Mar 28, 2017  5:20 PM CDT   [facility] with Lakhwinder Galindo, PT   [facility] ([facility] [place]  )    [address] [place] [address]   [phone]            Mar 31, 2017  4:00 PM CDT   [facility] with Lakhwinder Galindo, PT   [facility] ([facility] [place]  )    [address] [place] [address]   [phone]              Who to contact     If you have questions or need follow up information about today's clinic visit or your schedule please contact [facility] directly at [phone].  Normal or non-critical lab and imaging results will be communicated to you by MyChart, letter or phone within 4 business days after the clinic has received the results. If you do not hear from us within 7 days, please contact the clinic through MyChart or  phone. If you have a critical or abnormal lab result, we will notify you by phone as soon as possible.  Submit refill requests through [employer] or call your pharmacy and they will forward the refill request to us. Please allow 3 business days for your refill to be completed.          Additional Information About Your Visit        [employer] Information     [employer] gives you secure access to your electronic health record. If you see a primary care provider, you can also send messages to your care team and make appointments. If you have questions, please call your primary care clinic.  If you do not have a primary care provider, please call [phone] and they will assist you.        Care EveryWhere ID     This is your Care EveryWhere ID. This could be used by other organizations to access your [place] medical records  [id number]        Your Vitals Were     Last Period                   03/10/2013            Blood Pressure from Last 3 Encounters:   03/01/17 132/80   11/30/16 122/76   07/07/16 130/84    Weight from Last 3 Encounters:   03/01/17 59.1 kg (130 lb 3.2 oz)   11/30/16 130.2 kg (287 lb)   07/07/16 (!) 137.2 kg (302 lb 6.4 oz)              We Performed the Following     Therapeutic Activities     Therapeutic Exercises        Primary Care Provider Office Phone # Fax #    PAUL Smart [phone]       [facility]  [address]  [facility] [address]        Thank you!     Thank you for choosing [facility]   for your care. Our goal is always to provide you with excellent care. Hearing back from our patients is one way we can continue to improve our services. Please take a few minutes to complete the written survey that you may receive in the mail after your visit with us. Thank you!             Your Updated Medication List - Protect others around you: Learn how to safely use, store and throw away your medicines at www.disposemymeds.org.          This list  is accurate as of: 3/10/17  4:46 PM.  Always use your most recent med list.                   Brand Name Dispense Instructions for use    ACETAMINOPHEN PO          cephALEXin 500 MG capsule    KEFLEX    40 capsule    Take 1 capsule (500 mg) by mouth 4 times daily       * methylPREDNISolone 4 MG tablet    MEDROL DOSEPAK     TAKE 6 TABLETS ON DAY 1 AS DIRECTED ON PACKAGE AND DECREASE BY 1 TAB EACH DAY FOR A TOTAL OF 6 DAYS       * methylPREDNISolone 4 MG tablet    MEDROL DOSEPAK    21 tablet    Follow package instructions       rizatriptan 10 MG ODT tab    MAXALT-MLT    6 tablet    DISSOLVE 1 TABLET ON TONGUE AT ONSET OF HEADACHE. MAY REPEAT IN 2 HOURS. MAX 3 TABLETS PER DAY       * Notice:  This list has 2 medication(s) that are the same as other medications prescribed for you. Read the directions carefully, and ask your doctor or other care provider to review them with you.

## 2017-03-14 ENCOUNTER — THERAPY VISIT (OUTPATIENT)
Dept: PHYSICAL THERAPY | Facility: CLINIC | Age: 57
End: 2017-03-14
Payer: COMMERCIAL

## 2017-03-14 DIAGNOSIS — M25.552 BILATERAL HIP PAIN: ICD-10-CM

## 2017-03-14 DIAGNOSIS — M25.551 BILATERAL HIP PAIN: ICD-10-CM

## 2017-03-14 PROCEDURE — 97112 NEUROMUSCULAR REEDUCATION: CPT | Mod: GP | Performed by: PHYSICAL THERAPIST

## 2017-03-14 PROCEDURE — 97110 THERAPEUTIC EXERCISES: CPT | Mod: GP | Performed by: PHYSICAL THERAPIST

## 2017-03-17 ENCOUNTER — THERAPY VISIT (OUTPATIENT)
Dept: PHYSICAL THERAPY | Facility: CLINIC | Age: 57
End: 2017-03-17
Payer: COMMERCIAL

## 2017-03-17 DIAGNOSIS — M25.552 BILATERAL HIP PAIN: ICD-10-CM

## 2017-03-17 DIAGNOSIS — M25.551 BILATERAL HIP PAIN: ICD-10-CM

## 2017-03-17 PROCEDURE — 97112 NEUROMUSCULAR REEDUCATION: CPT | Mod: GP | Performed by: PHYSICAL THERAPIST

## 2017-03-17 PROCEDURE — 97110 THERAPEUTIC EXERCISES: CPT | Mod: GP | Performed by: PHYSICAL THERAPIST

## 2017-03-17 NOTE — PROGRESS NOTES
Subjective:    HPI                    Objective:    System    Physical Exam    General     ROS    Assessment/Plan:      SUBJECTIVE  Subjective changes as noted by pt:  Pt notes increased strength. Pt still notes intermittent pain in the left hip. Overall decrease in pain     Current pain level: 2/10     Changes in function:  Pt was able to swim for 30 minutes     Adverse reaction to treatment or activity:  None    OBJECTIVE  Changes in objective findings:  Improved pelvic stability with lateral movement against the sports cord.         ASSESSMENT  Rupal continues to require intervention to meet STG and LTG's: PT  Patient's symptoms are resolving.  Response to therapy has shown an improvement in  pain level, strength and function  Progress made towards STG/LTG?  Yes,     PLAN  Current treatment program is being advanced to more complex exercises.    PTA/ATC plan:  N/A    Please refer to the daily flowsheet for treatment today, total treatment time and time spent performing 1:1 timed codes.

## 2017-03-17 NOTE — MR AVS SNAPSHOT
After Visit Summary   3/17/2017    Rupal Puri    MRN: [id number]           Patient Information     Date Of Birth          1960        Visit Information        Provider Department      3/17/2017 4:40 PM Lakhwinder Galindo, PT [facility]        Today's Diagnoses     Bilateral hip pain           Follow-ups after your visit        Your next 10 appointments already scheduled     Mar 21, 2017  3:20 PM CDT   [facility] Spine with Lakhwinder Galindo, PT   [facility] ([facility] [place]  )    [address] [facility]  [address] [place] [address]   [phone]            Mar 24, 2017  3:20 PM CDT   [facility] Spine with Lakhwinder Galindo, PT   [facility] ([facility] [place]  )    [id number] [place] [address]  [address]  [facility] [address]   [phone]            Mar 28, 2017  5:20 PM CDT   [facility] Spine with Lakhwinder Galindo, PT   [facility] ([facility] [place]  )    [address] [place] [address]  [address] [place] [address]   [phone]            Mar 31, 2017  4:00 PM CDT   [facility] Spine with Lakhwinder Galindo, PT   [facility] ([facility] [place]  )    [id number] [place] [address]  [address] [place] [address]   [phone]              Who to contact     If you have questions or need follow up information about today's clinic visit or your schedule please contact [facility] directly at [phone].  Normal or non-critical lab and imaging results will be communicated to you by MyChart, letter or phone within 4 business days after the clinic has received the results. If you do not hear from us within 7 days, please contact the clinic through [employer] or phone. If you have a critical or abnormal lab result, we will notify you by phone as soon as possible.  Submit refill requests through [employer] or call your pharmacy and they will forward the refill request to us. Please allow 3 business days for your refill to be completed.          Additional Information About Your Visit        MyChart Information     [employer] gives  you secure access to your electronic health record. If you see a primary care provider, you can also send messages to your care team and make appointments. If you have questions, please call your primary care clinic.  If you do not have a primary care provider, please call [phone] and they will assist you.        Care EveryWhere ID     This is your Care EveryWhere ID. This could be used by other organizations to access your [place] medical records  [id number]        Your Vitals Were     Last Period                   03/10/2013            Blood Pressure from Last 3 Encounters:   03/01/17 132/80   11/30/16 122/76   07/07/16 130/84    Weight from Last 3 Encounters:   03/01/17 59.1 kg (130 lb 3.2 oz)   11/30/16 130.2 kg (287 lb)   07/07/16 (!) 137.2 kg (302 lb 6.4 oz)              We Performed the Following     Neuromuscular Re-Education     Therapeutic Exercises        Primary Care Provider Office Phone # Fax #    PAUL Smart [phone]       [facility]  [address] [facility] [address]        Thank you!     Thank you for choosing [facility]  for your care. Our goal is always to provide you with excellent care. Hearing back from our patients is one way we can continue to improve our services. Please take a few minutes to complete the written survey that you may receive in the mail after your visit with us. Thank you!             Your Updated Medication List - Protect others around you: Learn how to safely use, store and throw away your medicines at www.disposemymeds.org.          This list is accurate as of: 3/17/17  5:53 PM.  Always use your most recent med list.                   Brand Name Dispense Instructions for use    ACETAMINOPHEN PO          cephALEXin 500 MG capsule    KEFLEX    40 capsule    Take 1 capsule (500 mg) by mouth 4 times daily       * methylPREDNISolone 4 MG tablet    MEDROL DOSEPAK     TAKE 6 TABLETS ON DAY 1 AS DIRECTED ON  PACKAGE AND DECREASE BY 1 TAB EACH DAY FOR A TOTAL OF 6 DAYS       * methylPREDNISolone 4 MG tablet    MEDROL DOSEPAK    21 tablet    Follow package instructions       rizatriptan 10 MG ODT tab    MAXALT-MLT    6 tablet    DISSOLVE 1 TABLET ON TONGUE AT ONSET OF HEADACHE. MAY REPEAT IN 2 HOURS. MAX 3 TABLETS PER DAY       * Notice:  This list has 2 medication(s) that are the same as other medications prescribed for you. Read the directions carefully, and ask your doctor or other care provider to review them with you.

## 2017-03-28 ENCOUNTER — THERAPY VISIT (OUTPATIENT)
Dept: PHYSICAL THERAPY | Facility: CLINIC | Age: 57
End: 2017-03-28
Payer: COMMERCIAL

## 2017-03-28 DIAGNOSIS — M25.552 BILATERAL HIP PAIN: ICD-10-CM

## 2017-03-28 DIAGNOSIS — M25.551 BILATERAL HIP PAIN: ICD-10-CM

## 2017-03-28 PROCEDURE — 97110 THERAPEUTIC EXERCISES: CPT | Mod: GP | Performed by: PHYSICAL THERAPIST

## 2017-03-28 PROCEDURE — 97112 NEUROMUSCULAR REEDUCATION: CPT | Mod: GP | Performed by: PHYSICAL THERAPIST

## 2017-03-28 NOTE — PROGRESS NOTES
Subjective:    HPI                    Objective:    System    Physical Exam    General     ROS    Assessment/Plan:      SUBJECTIVE  Subjective changes as noted by pt:  Pt reports slipping in the pool 3-22-17 going into the splits. Pt noted increased pain for 2 days but pain is decreasing. Pt notes tightness right lateral ITB and knee and pain in left anterior hip     Current pain level: 5/10     Changes in function:  Pt was able to walk for 25 minutes without difficulty. Pt still notes difficulty with sleeping     Adverse reaction to treatment or activity:  None    OBJECTIVE  Changes in objective findings:  Pt remains tender to palpation right ITB. Pt noted decreased pain with distraction of left hip. Pelvic stability improved        ASSESSMENT  Rupal continues to require intervention to meet STG and LTG's: PT  Patient is progressing as expected.  Response to therapy has shown an improvement in  strength  Progress made towards STG/LTG?  Yes,     PLAN  Current treatment program is being advanced to more complex exercises.    PTA/ATC plan:  N/A    Please refer to the daily flowsheet for treatment today, total treatment time and time spent performing 1:1 timed codes.

## 2017-03-28 NOTE — MR AVS SNAPSHOT
After Visit Summary   3/28/2017    Rupal Puri    MRN: [id number]           Patient Information     Date Of Birth          1960        Visit Information        Provider Department      3/28/2017 5:20 PM Lakhwinder Galindo, PT [facility]        Today's Diagnoses     Bilateral hip pain           Follow-ups after your visit        Your next 10 appointments already scheduled     Mar 31, 2017  4:00 PM CDT   [facility] Spine with Lakhwinder Galindo, PT   [facility] ([facility]  )    [id number] [place] [address]  [address] [place] [address]   [phone]            Apr 03, 2017  5:40 PM CDT   [facility] Spine with Lakhwinder Galindo, PT   [facility] ([facility]  )    [address] [facility]  [address] [place] [address]   [phone]            Apr 07, 2017  4:00 PM CDT   [facility] Spine with Lakhwinder Galindo, PT   [facility] ([facility]  )    [address] [address] [address]   [phone]              Who to contact     If you have questions or need follow up information about today's clinic visit or your schedule please contact [facility] directly at [phone].  Normal or non-critical lab and imaging results will be communicated to you by [employer], letter or phone within 4 business days after the clinic has received the results. If you do not hear from us within 7 days, please contact the clinic through [employer] or phone. If you have a critical or abnormal lab result, we will notify you by phone as soon as possible.  Submit refill requests through [employer] or call your pharmacy and they will forward the refill request to us. Please allow 3 business days for your refill to be completed.          Additional Information About Your Visit        [employer] Information     [employer] gives you secure access to your electronic health record. If you see a primary care provider, you can also send messages to your care team and make appointments. If you have questions,  please call your primary care clinic.  If you do not have a primary care provider, please call [phone] and they will assist you.        Care EveryWhere ID     This is your Care EveryWhere ID. This could be used by other organizations to access your [place] medical records  [id number]        Your Vitals Were     Last Period                   03/10/2013            Blood Pressure from Last 3 Encounters:   03/01/17 132/80   11/30/16 122/76   07/07/16 130/84    Weight from Last 3 Encounters:   03/01/17 59.1 kg (130 lb 3.2 oz)   11/30/16 130.2 kg (287 lb)   07/07/16 (!) 137.2 kg (302 lb 6.4 oz)              We Performed the Following     Neuromuscular Re-Education     Therapeutic Exercises        Primary Care Provider Office Phone # Fax #    PAUL Smart [phone]       [facility]  [address]  [facility] [address]        Thank you!     Thank you for choosing [facility]  for your care. Our goal is always to provide you with excellent care. Hearing back from our patients is one way we can continue to improve our services. Please take a few minutes to complete the written survey that you may receive in the mail after your visit with us. Thank you!             Your Updated Medication List - Protect others around you: Learn how to safely use, store and throw away your medicines at www.disposemymeds.org.          This list is accurate as of: 3/28/17 11:59 PM.  Always use your most recent med list.                   Brand Name Dispense Instructions for use    ACETAMINOPHEN PO          cephALEXin 500 MG capsule    KEFLEX    40 capsule    Take 1 capsule (500 mg) by mouth 4 times daily       * methylPREDNISolone 4 MG tablet    MEDROL DOSEPAK     TAKE 6 TABLETS ON DAY 1 AS DIRECTED ON PACKAGE AND DECREASE BY 1 TAB EACH DAY FOR A TOTAL OF 6 DAYS       * methylPREDNISolone 4 MG tablet    MEDROL DOSEPAK    21 tablet    Follow package instructions       rizatriptan  10 MG ODT tab    MAXALT-MLT    6 tablet    DISSOLVE 1 TABLET ON TONGUE AT ONSET OF HEADACHE. MAY REPEAT IN 2 HOURS. MAX 3 TABLETS PER DAY       * Notice:  This list has 2 medication(s) that are the same as other medications prescribed for you. Read the directions carefully, and ask your doctor or other care provider to review them with you.

## 2017-03-31 ENCOUNTER — THERAPY VISIT (OUTPATIENT)
Dept: PHYSICAL THERAPY | Facility: CLINIC | Age: 57
End: 2017-03-31
Payer: COMMERCIAL

## 2017-03-31 DIAGNOSIS — M25.551 BILATERAL HIP PAIN: ICD-10-CM

## 2017-03-31 DIAGNOSIS — M25.552 BILATERAL HIP PAIN: ICD-10-CM

## 2017-03-31 PROCEDURE — 97112 NEUROMUSCULAR REEDUCATION: CPT | Mod: GP | Performed by: PHYSICAL THERAPIST

## 2017-03-31 PROCEDURE — 97110 THERAPEUTIC EXERCISES: CPT | Mod: GP | Performed by: PHYSICAL THERAPIST

## 2017-04-03 ENCOUNTER — THERAPY VISIT (OUTPATIENT)
Dept: PHYSICAL THERAPY | Facility: CLINIC | Age: 57
End: 2017-04-03
Payer: COMMERCIAL

## 2017-04-03 DIAGNOSIS — M25.551 BILATERAL HIP PAIN: ICD-10-CM

## 2017-04-03 DIAGNOSIS — M25.552 BILATERAL HIP PAIN: ICD-10-CM

## 2017-04-03 PROCEDURE — 97110 THERAPEUTIC EXERCISES: CPT | Mod: GP | Performed by: PHYSICAL THERAPIST

## 2017-04-03 PROCEDURE — 97530 THERAPEUTIC ACTIVITIES: CPT | Mod: GP | Performed by: PHYSICAL THERAPIST

## 2017-04-07 ENCOUNTER — THERAPY VISIT (OUTPATIENT)
Dept: PHYSICAL THERAPY | Facility: CLINIC | Age: 57
End: 2017-04-07
Payer: COMMERCIAL

## 2017-04-07 DIAGNOSIS — M25.551 BILATERAL HIP PAIN: ICD-10-CM

## 2017-04-07 DIAGNOSIS — M25.552 BILATERAL HIP PAIN: ICD-10-CM

## 2017-04-07 PROCEDURE — 97110 THERAPEUTIC EXERCISES: CPT | Mod: GP | Performed by: PHYSICAL THERAPIST

## 2017-04-07 PROCEDURE — 97530 THERAPEUTIC ACTIVITIES: CPT | Mod: GP | Performed by: PHYSICAL THERAPIST

## 2017-04-07 NOTE — PROGRESS NOTES
Subjective:    HPI                    Objective:    System    Physical Exam    General     ROS    Assessment/Plan:      DISCHARGE REPORT    Progress reporting period is from 3-3-17 to 4-7-17.       SUBJECTIVE  Subjective changes noted by patient:  Pt notes decreased pain and increased strength. Pt still feels stiffness in the back of the knees and the front of the left hip.       Current pain level is 1/10  .     Previous pain level was  6/10  .   Changes in function:  Pt still notes difficulty bending forward to touch her toes. Pt has returned to exercising with her .   Adverse reaction to treatment or activity: None    OBJECTIVE  Changes noted in objective findings:  Overall flexibility has improved but pt remains tight in left anterior hip and right piriformis, bilateral hamstrings. Pt demonstrates improved pelvic stability. Balance improving but remains difficult for pt.       ASSESSMENT/PLAN  Updated problem list and treatment plan: Diagnosis 1:  Bilateral hip pain  Pain -  hot/cold therapy, self management, education and home program  Decreased ROM/flexibility - therapeutic exercise, therapeutic activity and home program  Decreased strength - therapeutic exercise, therapeutic activities and home program  STG/LTGs have been met or progress has been made towards goals:  Yes,   Assessment of Progress: The patient's condition is improving.  Self Management Plans:  Patient has been instructed in a home treatment program.  I have re-evaluated this patient and find that the nature, scope, duration and intensity of the therapy is appropriate for the medical condition of the patient.  Rupal continues to require the following intervention to meet STG and LTG's:  PT intervention is no longer required to meet STG/LTG.    Recommendations:  This patient is ready to be discharged from therapy and continue their home treatment program.    Please refer to the daily flowsheet for treatment today, total treatment  time and time spent performing 1:1 timed codes.

## 2017-04-07 NOTE — MR AVS SNAPSHOT
After Visit Summary   4/7/2017    Rupal Puri    MRN: [id number]           Patient Information     Date Of Birth          1960        Visit Information        Provider Department      4/7/2017 4:00 PM Lakhwinder Galindo, PT [facility]        Today's Diagnoses     Bilateral hip pain           Follow-ups after your visit        Who to contact     If you have questions or need follow up information about today's clinic visit or your schedule please contact [facility] directly at [phone].  Normal or non-critical lab and imaging results will be communicated to you by 3sunhart, letter or phone within 4 business days after the clinic has received the results. If you do not hear from us within 7 days, please contact the clinic through Many[employer]at or phone. If you have a critical or abnormal lab result, we will notify you by phone as soon as possible.  Submit refill requests through [employer] or call your pharmacy and they will forward the refill request to us. Please allow 3 business days for your refill to be completed.          Additional Information About Your Visit        MyChart Information     [employer] gives you secure access to your electronic health record. If you see a primary care provider, you can also send messages to your care team and make appointments. If you have questions, please call your primary care clinic.  If you do not have a primary care provider, please call [phone] and they will assist you.        Care EveryWhere ID     This is your Care EveryWhere ID. This could be used by other organizations to access your [place] medical records  [id number]        Your Vitals Were     Last Period                   03/10/2013            Blood Pressure from Last 3 Encounters:   03/01/17 132/80   11/30/16 122/76   07/07/16 130/84    Weight from Last 3 Encounters:   03/01/17 59.1 kg (130 lb 3.2 oz)   11/30/16 130.2 kg (287 lb)   07/07/16 (!) 137.2 kg (302 lb 6.4 oz)               We Performed the Following     Therapeutic Activities     Therapeutic Exercises        Primary Care Provider Office Phone # Fax #    PAUL Smart [phone]       [facility]  [address]  [facility] [address]        Thank you!     Thank you for choosing [facility]  for your care. Our goal is always to provide you with excellent care. Hearing back from our patients is one way we can continue to improve our services. Please take a few minutes to complete the written survey that you may receive in the mail after your visit with us. Thank you!             Your Updated Medication List - Protect others around you: Learn how to safely use, store and throw away your medicines at www.disposemymeds.org.          This list is accurate as of: 4/7/17  4:38 PM.  Always use your most recent med list.                   Brand Name Dispense Instructions for use    ACETAMINOPHEN PO          cephALEXin 500 MG capsule    KEFLEX    40 capsule    Take 1 capsule (500 mg) by mouth 4 times daily       * methylPREDNISolone 4 MG tablet    MEDROL DOSEPAK     TAKE 6 TABLETS ON DAY 1 AS DIRECTED ON PACKAGE AND DECREASE BY 1 TAB EACH DAY FOR A TOTAL OF 6 DAYS       * methylPREDNISolone 4 MG tablet    MEDROL DOSEPAK    21 tablet    Follow package instructions       rizatriptan 10 MG ODT tab    MAXALT-MLT    6 tablet    DISSOLVE 1 TABLET ON TONGUE AT ONSET OF HEADACHE. MAY REPEAT IN 2 HOURS. MAX 3 TABLETS PER DAY       * Notice:  This list has 2 medication(s) that are the same as other medications prescribed for you. Read the directions carefully, and ask your doctor or other care provider to review them with you.

## 2017-05-08 PROBLEM — M25.552 BILATERAL HIP PAIN: Status: RESOLVED | Noted: 2017-03-03 | Resolved: 2017-05-08

## 2017-05-08 PROBLEM — M25.551 BILATERAL HIP PAIN: Status: RESOLVED | Noted: 2017-03-03 | Resolved: 2017-05-08

## 2017-06-03 ENCOUNTER — HEALTH MAINTENANCE LETTER (OUTPATIENT)
Age: 57
End: 2017-06-03

## 2017-06-23 ENCOUNTER — OFFICE VISIT (OUTPATIENT)
Dept: FAMILY MEDICINE | Facility: CLINIC | Age: 57
End: 2017-06-23

## 2017-06-23 VITALS
HEART RATE: 80 BPM | RESPIRATION RATE: 20 BRPM | BODY MASS INDEX: 43.87 KG/M2 | WEIGHT: 273 LBS | SYSTOLIC BLOOD PRESSURE: 110 MMHG | DIASTOLIC BLOOD PRESSURE: 60 MMHG | TEMPERATURE: 98.1 F | HEIGHT: 66 IN

## 2017-06-23 DIAGNOSIS — M25.552 BILATERAL HIP PAIN: Primary | ICD-10-CM

## 2017-06-23 DIAGNOSIS — M25.551 BILATERAL HIP PAIN: Primary | ICD-10-CM

## 2017-06-23 PROCEDURE — 99213 OFFICE O/P EST LOW 20 MIN: CPT | Performed by: PHYSICIAN ASSISTANT

## 2017-06-23 RX ORDER — SPIRONOLACTONE 50 MG/1
50 TABLET, FILM COATED ORAL 2 TIMES DAILY
COMMUNITY
Start: 2017-04-26 | End: 2018-01-05

## 2017-06-23 NOTE — MR AVS SNAPSHOT
After Visit Summary   6/23/2017    Rupal Puri    MRN: [id number]           Patient Information     Date Of Birth          1960        Visit Information        Provider Department      6/23/2017 10:00 AM Aniyah Meyers PA-C [facility]        Today's Diagnoses     Bilateral hip pain    -  1       Follow-ups after your visit        Additional Services     ORTHOPEDICS ADULT REFERRAL       Your provider has referred you to: [facility] [phone]   [email or web address]    Patient will call to schedule. No need to fax.    Please be aware that coverage of these services is subject to the terms and limitations of your health insurance plan.  Call member services at your health plan with any benefit or coverage questions.      Please bring the following to your appointment:    >>   Any x-rays, CTs or MRIs which have been performed.  Contact the facility where they were done to arrange for  prior to your scheduled appointment.    >>   List of current medications   >>   This referral request   >>   Any documents/labs given to you for this referral                  Follow-up notes from your care team     Return if symptoms worsen or fail to improve.      Your next 10 appointments already scheduled     Jul 03, 2017  8:30 AM CDT   Pre-Op physical with Donna Alvarez MD   [facility] ([facility])    [address]  [address]  [facility] [id number] [phone]              Who to contact     If you have questions or need follow up information about today's clinic visit or your schedule please contact [facility] directly at [phone].  Normal or non-critical lab and imaging results will be communicated to you by MyChart, letter or phone within 4 business days after the clinic has received the results. If you do not hear from us within 7  "days, please contact the clinic through [employer] or phone. If you have a critical or abnormal lab result, we will notify you by phone as soon as possible.  Submit refill requests through [employer] or call your pharmacy and they will forward the refill request to us. Please allow 3 business days for your refill to be completed.          Additional Information About Your Visit        [employer] Information     Ana[employer]ios gives you secure access to your electronic health record. If you see a primary care provider, you can also send messages to your care team and make appointments. If you have questions, please call your primary care clinic.  If you do not have a primary care provider, please call [phone] and they will assist you.        Care EveryWhere ID     This is your Care EveryWhere ID. This could be used by other organizations to access your [place] medical records  [id number]        Your Vitals Were     Pulse Temperature Respirations Height Last Period BMI (Body Mass Index)    80 98.1  F (36.7  C) (Oral) 20 1.664 m (5' 5.5\") 03/10/2013 44.74 kg/m2       Blood Pressure from Last 3 Encounters:   06/23/17 110/60   03/01/17 132/80   11/30/16 122/76    Weight from Last 3 Encounters:   06/23/17 123.8 kg (273 lb)   03/01/17 59.1 kg (130 lb 3.2 oz)   11/30/16 130.2 kg (287 lb)              We Performed the Following     ORTHOPEDICS ADULT REFERRAL        Primary Care Provider Office Phone # Fax #    PAUL Smart [phone]       [facility]  [address] [facility] [address]        Equal Access to Services     [facility] HospitalSYLVESTER : Hadii santi Mcguire, waaxda luqadaha, qaybta kaalbridget parker. So [facility] [phone].    ATENCIÓN: Si habla español, tiene a jackson disposición servicios gratuitos de asistencia lingüística. Marsha al [phone].    We comply with applicable federal civil rights laws and [place] laws. We do not " discriminate on the basis of race, color, national origin, age, disability sex, sexual orientation or gender identity.            Thank you!     Thank you for choosing [facility]  for your care. Our goal is always to provide you with excellent care. Hearing back from our patients is one way we can continue to improve our services. Please take a few minutes to complete the written survey that you may receive in the mail after your visit with us. Thank you!             Your Updated Medication List - Protect others around you: Learn how to safely use, store and throw away your medicines at www.disposemymeds.org.          This list is accurate as of: 6/23/17 11:59 PM.  Always use your most recent med list.                   Brand Name Dispense Instructions for use Diagnosis    ACETAMINOPHEN PO           rizatriptan 10 MG ODT tab    MAXALT-MLT    6 tablet    DISSOLVE 1 TABLET ON TONGUE AT ONSET OF HEADACHE. MAY REPEAT IN 2 HOURS. MAX 3 TABLETS PER DAY    Migraine without aura and without status migrainosus, not intractable       spironolactone 50 MG tablet    ALDACTONE     Take 50 mg by mouth 2 times daily

## 2017-06-23 NOTE — NURSING NOTE
Rupal Puri is here for left hip pain for the past 2 weeks. NKI to left hip.  Questioned patient about current smoking habits.  Pt. quit smoking some time ago.  PULSE regular  My Chart: active  CLASSIFICATION OF OVERWEIGHT AND OBESITY BY BMI                        Obesity Class           BMI(kg/m2)  Underweight                                    < 18.5  Normal                                         18.5-24.9  Overweight                                     25.0-29.9  OBESITY                     I                  30.0-34.9                             II                 35.0-39.9  EXTREME OBESITY             III                >40                            Patient's  BMI Body mass index is 44.74 kg/(m^2).  http://hin.nhlbi.nih.gov/menuplanner/menu.cgi  Pre-visit planning  Immunizations - up to date  Colonoscopy - is up to date  Mammogram - is up to date  Asthma -   PHQ9 -    VASILIY-7 -

## 2017-06-23 NOTE — PROGRESS NOTES
"CC: Hip pain    History:  For the last 2 years or so, Rupal has been having troubles with left hip, but now right hip is hurting as well.     Rupal has been trying physical therapy without much relief. Stopped this in beginning of April. Has been doing PT exercises since that time. Goes to  ather gym as well.  Has been taking 2 Tylenol before bed and 2 more during night. Can't sleep on back, and can't sleep on left side. Has to try to sleep on right side.    Has sit to stand desk, which doesn't seem to be helping.    PMH, MEDICATIONS, ALLERGIES, SOCIAL AND FAMILY HISTORY in [facility] and reviewed by me personally.      ROS negative other than the symptoms noted above in the HPI.        Examination   /60 (BP Location: Right arm, Patient Position: Chair, Cuff Size: Adult Large)  Pulse 80  Temp 98.1  F (36.7  C) (Oral)  Resp 20  Ht 1.664 m (5' 5.5\")  Wt 123.8 kg (273 lb)  LMP 03/10/2013  BMI 44.74 kg/m2       Constitutional: Sitting comfortably, in no acute distress. Vital signs noted  Cardiovascular:  regular rate and rhythm, no murmurs, clicks, or gallops  Respiratory:  normal respiratory rate and rhythm, lungs clear to auscultation  M/S: ROM of hips full, but pain in anterior thigh/groin with right abduction and flexion. No tenderness to palpation over spine, SI joint. No changes to skin.   NEURO:  muscle strength normal, reflexes normal and symmetric  SKIN: No jaundice/pallor/rash.   Psychiatric: mentation appears normal and affect normal/bright        A/P    ICD-10-CM    1. Bilateral hip pain M25.551 ORTHOPEDICS ADULT REFERRAL    M25.552     pt elected to wait to have x-ray.       DISCUSSION: Offered to take x-ray of pelvis to determine if there is bony abnormalities contributing to this. However, after discussing with Rupal that she will likely need to see an ortho specialist to go over more aggressive treatment options, we decided to wait and have her get her x-ray at that time. She will " contact her ortho clinic to schedule. I will write referral in case insurance requires. In meantime, continue exercise, following healthy diet in effort for weight loss.     follow up visit: As needed    Aniyah Meyers PA-C  [facility]

## 2017-07-03 ENCOUNTER — OFFICE VISIT (OUTPATIENT)
Dept: FAMILY MEDICINE | Facility: CLINIC | Age: 57
End: 2017-07-03

## 2017-07-03 VITALS
HEART RATE: 84 BPM | DIASTOLIC BLOOD PRESSURE: 80 MMHG | OXYGEN SATURATION: 98 % | TEMPERATURE: 98.3 F | HEIGHT: 65 IN | RESPIRATION RATE: 16 BRPM | WEIGHT: 276 LBS | SYSTOLIC BLOOD PRESSURE: 120 MMHG | BODY MASS INDEX: 45.98 KG/M2

## 2017-07-03 DIAGNOSIS — M20.42 HAMMER TOE OF SECOND TOE OF LEFT FOOT: ICD-10-CM

## 2017-07-03 DIAGNOSIS — S99.922D PLANTAR PLATE INJURY, LEFT, SUBSEQUENT ENCOUNTER: Primary | ICD-10-CM

## 2017-07-03 DIAGNOSIS — Z01.818 PRE-OP EXAM: ICD-10-CM

## 2017-07-03 LAB — HEMOGLOBIN: 13.7 G/DL (ref 11.7–15.7)

## 2017-07-03 PROCEDURE — 36415 COLL VENOUS BLD VENIPUNCTURE: CPT | Performed by: FAMILY MEDICINE

## 2017-07-03 PROCEDURE — 85018 HEMOGLOBIN: CPT | Performed by: FAMILY MEDICINE

## 2017-07-03 PROCEDURE — 99214 OFFICE O/P EST MOD 30 MIN: CPT | Performed by: FAMILY MEDICINE

## 2017-07-03 NOTE — PROGRESS NOTES
[facility]  [address]  [address]  [facility] [address] [phone]  Dept: [phone]    PRE-OP EVALUATION:  Today's date: 7/3/2017    Rupal Puri (: 1960) presents for pre-operative evaluation assessment as requested by Dr. SUSSY Worley.  She requires evaluation and anesthesia risk assessment prior to undergoing surgery/procedure for treatment of L Foot .  Proposed procedure: L foot hammertoe and plantar plate repair    Date of Surgery/ Procedure: 17  Time of Surgery/ Procedure: AM  Hospital/Surgical Facility: [facility]  [phone]      Primary Physician: Denia Salomon  Type of Anesthesia Anticipated: TBD    Patient has a Health Care Directive or Living Will:  NO    1. NO - Do you have a history of heart attack, stroke, stent, bypass or surgery on an artery in the head, neck, heart or legs?  2. NO - Do you ever have any pain or discomfort in your chest?  3. NO - Do you have a history of  Heart Failure?  4. NO - Are you troubled by shortness of breath when: walking on the level, up a slight hill or at night?  5. NO - Do you currently have a cold, bronchitis or other respiratory infection?  6. NO - Do you have a cough, shortness of breath or wheezing?  7. NO - Do you sometimes get pains in the calves of your legs when you walk?  8. NO - Do you or anyone in your family have previous history of blood clots?  9. NO - Do you or does anyone in your family have a serious bleeding problem such as prolonged bleeding following surgeries or cuts?  10. NO - Have you ever had problems with anemia or been told to take iron pills?  11. NO - Have you had any abnormal blood loss such as black, tarry or bloody stools, or abnormal vaginal bleeding?  12. NO - Have you ever had a blood transfusion?  13. NO - Have you or any of your relatives ever had problems with anesthesia?  14. NO - Do you have sleep apnea, excessive snoring or daytime  drowsiness?  15. NO - Do you have any prosthetic heart valves?  16. YES - DO YOU HAVE PROSTHETIC JOINTS? Bilateral knee replacement  17. NO - Is there any chance that you may be pregnant?      HPI:                                                      Brief HPI related to upcoming procedure: Right foot surgery      See problem list for active medical problems.  Problems all longstanding and stable, except as noted/documented.  See ROS for pertinent symptoms related to these conditions.                                                                                                  .    MEDICAL HISTORY:                                                      Patient Active Problem List    Diagnosis Date Noted     Internal hemorrhoids - per 2017 colonoscopy 03/06/2017     Priority: Medium     Benign neoplasm of cecum 2017 colonoscopy - repeat 2022 03/06/2017     Priority: Medium     Chronic pain of right knee 01/09/2017     Priority: Medium     Shoulder pain 07/08/2016     Priority: Medium     Lateral epicondylitis 07/08/2016     Priority: Medium     Morbid obesity due to excess calories (H) 05/16/2016     Priority: Medium     Abnormal cardiovascular stress test      Priority: Medium     Chest pain      Priority: Medium     Cellulitis 07/22/2014     Priority: Medium     History of GI bleed 2014 07/22/2014     Priority: Medium     Knee pain 07/17/2014     Priority: Medium     Arthritis of knee 04/23/2013     Priority: Medium     Health Care Home 03/13/2013     Priority: Medium     State Tier Level:  Tier 2  Status:  NA  Care Coordinator:     See Letters for [facility] Care Plan           Cough variant asthma 04/16/2011     Priority: Medium     Mixed hyperlipidemia 12/22/2010     Priority: Medium     Abnormal glucose 12/22/2010     Priority: Medium     Problem list name updated by automated process. Provider to review       Metabolic syndrome 12/22/2010     Priority: Medium     Obesity, high TG's and low HDL, fasting glucose  borderline       Family history of ischemic heart disease 12/20/2010     Priority: Medium     Family history of diabetes mellitus 12/20/2010     Priority: Medium     CARDIOVASCULAR SCREENING; LDL GOAL LESS THAN 130 02/10/2010     Priority: Medium     Migraine 06/17/2003     Priority: Medium     Problem list name updated by automated process. Provider to review       ACP (advance care planning) 01/14/2016     Priority: Low      Past Medical History:   Diagnosis Date     Abnormal cardiovascular stress test      Chest pain      Contact dermatitis and other eczema, due to unspecified cause      Migraine, unspecified, without mention of intractable migraine without mention of status migrainosus      Mild intermittent asthma      Plantar fascial fibromatosis      Past Surgical History:   Procedure Laterality Date     ARTHROPLASTY KNEE  4/23/2013    Procedure: ARTHROPLASTY KNEE;  RIGHT TOTAL KNEE ARTHROPLASTY (IFEOMA)^;  Surgeon: Simon Pitts MD;  Location:  OR     ARTHROPLASTY KNEE  7/17/2014    Procedure: ARTHROPLASTY KNEE;  Surgeon: Simon Pitts MD;  Location:  OR     AS REPAIR FEMORAL HERNIA,REDUCIBLE  1991    hernia repair femoral     ESOPHAGOSCOPY, GASTROSCOPY, DUODENOSCOPY (EGD), COMBINED  7/22/2014    Procedure: COMBINED ESOPHAGOSCOPY, GASTROSCOPY, DUODENOSCOPY (EGD), BIOPSY SINGLE OR MULTIPLE;  Surgeon: Natalie Gibbons MD;  Location:  GI     TONSILLECTOMY & ADENOIDECTOMY  1966    age 6     Current Outpatient Prescriptions   Medication Sig Dispense Refill     spironolactone (ALDACTONE) 50 MG tablet Take 50 mg by mouth 2 times daily       rizatriptan (MAXALT-MLT) 10 MG disintegrating tablet DISSOLVE 1 TABLET ON TONGUE AT ONSET OF HEADACHE. MAY REPEAT IN 2 HOURS. MAX 3 TABLETS PER DAY 6 tablet 11     ACETAMINOPHEN PO        OTC products: None, except as noted above    Allergies   Allergen Reactions     Ibuprofen GI Disturbance     2 days of gi upset after taking any NSAID      Latex Allergy:  "NO    Social History   Substance Use Topics     Smoking status: Former Smoker     Packs/day: 1.00     Years: 5.00     Quit date: 3/20/1988     Smokeless tobacco: Never Used     Alcohol use 0.5 oz/week     1 drink(s) per week      Comment: rare     History   Drug Use No       REVIEW OF SYSTEMS:                                                    C: NEGATIVE for fever, chills, change in weight  E/M: NEGATIVE for ear, mouth and throat problems  R: NEGATIVE for significant cough or SOB  CV: NEGATIVE for chest pain, palpitations or peripheral edema    EXAM:                                                    /80 (BP Location: Left arm, Cuff Size: Adult Large)  Pulse 84  Temp 98.3  F (36.8  C) (Oral)  Resp 16  Ht 1.651 m (5' 5\")  Wt 125.2 kg (276 lb)  LMP 03/10/2013  SpO2 98%  BMI 45.93 kg/m2  GENERAL APPEARANCE: healthy, alert and no distress  HENT: ear canals and TM's normal and nose and mouth without ulcers or lesions  RESP: lungs clear to auscultation - no rales, rhonchi or wheezes  CV: regular rate and rhythm, normal S1 S2, no S3 or S4 and no murmur, click or rub   ABDOMEN: soft, nontender, no HSM or masses and bowel sounds normal  NEURO: Normal strength and tone, sensory exam grossly normal, mentation intact and speech normal    DIAGNOSTICS:                                                    HGB: 13.7 gm/dl    Recent Labs   Lab Test  04/30/16   1030  01/14/16   1926  07/22/14   0640   04/23/13   0602  04/17/13   1307   HGB  13.6   --   9.9*   < >  13.3   --    PLT  229   --   200   < >  214   --    INR   --    --    --    --   0.89   --    NA  141  139  142   < >  143   --    POTASSIUM  4.5  4.5  3.6   < >  4.4   --    CR  0.68  0.84  0.92   < >  0.78   --    A1C   --    --    --    --    --   5.9    < > = values in this interval not displayed.        IMPRESSION:                                                    Diagnosis/reason for consult: (S89.92XD) Plantar plate injury, left, subsequent encounter  " (primary encounter diagnosis)    (M20.42) Hammer toe of second toe of left foot    (Z01.818) Pre-op exam      The proposed surgical procedure is considered LOW risk.    REVISED CARDIAC RISK INDEX  The patient has the following serious cardiovascular risks for perioperative complications such as (MI, PE, VFib and 3  AV Block):  No serious cardiac risks  INTERPRETATION: 0 risks: Class I (very low risk - 0.4% complication rate)    The patient has the following additional risks for perioperative complications:  No identified additional risks        RECOMMENDATIONS:                                                      --Consult hospital rounder / IM to assist post-op medical management    --Patient is to take all scheduled medications on the day of surgery EXCEPT for modifications listed below.    APPROVAL GIVEN to proceed with proposed procedure, without further diagnostic evaluation       Signed Electronically by: Donna Alvarez MD    Copy of this evaluation report is provided to requesting physician.

## 2017-07-03 NOTE — LETTER
[facility]              [facility], PRobbi NELSON  [facility]  [address]  [place]  [address]        For Emergencies:  Call 911      For Clinic Appointments:   [phone]                     [facility], [place]  [address]  [address]  [facility] [address] [phone]  Dept: [phone]    PRE-OP EVALUATION:  Today's date: 7/3/2017    Rupal Puri (: 1960) presents for pre-operative evaluation assessment as requested by Dr. SUSSY Worley.  She requires evaluation and anesthesia risk assessment prior to undergoing surgery/procedure for treatment of L Foot .  Proposed procedure: L foot hammertoe and plantar plate repair    Date of Surgery/ Procedure: 17  Time of Surgery/ Procedure:   Hospital/Surgical Facility: [facility]  [phone]      Primary Physician: Denia Salomon  Type of Anesthesia Anticipated: TBD    Patient has a Health Care Directive or Living Will:  NO    1. NO - Do you have a history of heart attack, stroke, stent, bypass or surgery on an artery in the head, neck, heart or legs?  2. NO - Do you ever have any pain or discomfort in your chest?  3. NO - Do you have a history of  Heart Failure?  4. NO - Are you troubled by shortness of breath when: walking on the level, up a slight hill or at night?  5. NO - Do you currently have a cold, bronchitis or other respiratory infection?  6. NO - Do you have a cough, shortness of breath or wheezing?  7. NO - Do you sometimes get pains in the calves of your legs when you walk?  8. NO - Do you or anyone in your family have previous history of blood clots?  9. NO - Do you or does anyone in your family have a serious bleeding problem such as prolonged bleeding following surgeries or cuts?  10. NO - Have you ever had problems with anemia or been told to take iron pills?  11.  NO - Have you had any abnormal blood loss such as black, tarry or bloody stools, or abnormal vaginal bleeding?  12. NO - Have you ever had a blood transfusion?  13. NO - Have you or any of your relatives ever had problems with anesthesia?  14. NO - Do you have sleep apnea, excessive snoring or daytime drowsiness?  15. NO - Do you have any prosthetic heart valves?  16. YES - DO YOU HAVE PROSTHETIC JOINTS? Bilateral knee replacement  17. NO - Is there any chance that you may be pregnant?      HPI:                                                      Brief HPI related to upcoming procedure: Right foot surgery      See problem list for active medical problems.  Problems all longstanding and stable, except as noted/documented.  See ROS for pertinent symptoms related to these conditions.                                                                                                  .    MEDICAL HISTORY:                                                      Patient Active Problem List    Diagnosis Date Noted     Internal hemorrhoids - per 2017 colonoscopy 03/06/2017     Priority: Medium     Benign neoplasm of cecum 2017 colonoscopy - repeat 2022 03/06/2017     Priority: Medium     Chronic pain of right knee 01/09/2017     Priority: Medium     Shoulder pain 07/08/2016     Priority: Medium     Lateral epicondylitis 07/08/2016     Priority: Medium     Morbid obesity due to excess calories (H) 05/16/2016     Priority: Medium     Abnormal cardiovascular stress test      Priority: Medium     Chest pain      Priority: Medium     Cellulitis 07/22/2014     Priority: Medium     History of GI bleed 2014 07/22/2014     Priority: Medium     Knee pain 07/17/2014     Priority: Medium     Arthritis of knee 04/23/2013     Priority: Medium     Health Care Home 03/13/2013     Priority: Medium     State Tier Level:  Tier 2  Status:  NA  Care Coordinator:     See Letters for [facility] Care Plan           Cough variant asthma 04/16/2011     Priority:  Medium     Mixed hyperlipidemia 12/22/2010     Priority: Medium     Abnormal glucose 12/22/2010     Priority: Medium     Problem list name updated by automated process. Provider to review       Metabolic syndrome 12/22/2010     Priority: Medium     Obesity, high TG's and low HDL, fasting glucose borderline       Family history of ischemic heart disease 12/20/2010     Priority: Medium     Family history of diabetes mellitus 12/20/2010     Priority: Medium     CARDIOVASCULAR SCREENING; LDL GOAL LESS THAN 130 02/10/2010     Priority: Medium     Migraine 06/17/2003     Priority: Medium     Problem list name updated by automated process. Provider to review       ACP (advance care planning) 01/14/2016     Priority: Low      Past Medical History:   Diagnosis Date     Abnormal cardiovascular stress test      Chest pain      Contact dermatitis and other eczema, due to unspecified cause      Migraine, unspecified, without mention of intractable migraine without mention of status migrainosus      Mild intermittent asthma      Plantar fascial fibromatosis      Past Surgical History:   Procedure Laterality Date     ARTHROPLASTY KNEE  4/23/2013    Procedure: ARTHROPLASTY KNEE;  RIGHT TOTAL KNEE ARTHROPLASTY (IFEOMA)^;  Surgeon: Simon Pitts MD;  Location:  OR     ARTHROPLASTY KNEE  7/17/2014    Procedure: ARTHROPLASTY KNEE;  Surgeon: Simon Pitts MD;  Location:  OR     AS REPAIR FEMORAL HERNIA,REDUCIBLE  1991    hernia repair femoral     ESOPHAGOSCOPY, GASTROSCOPY, DUODENOSCOPY (EGD), COMBINED  7/22/2014    Procedure: COMBINED ESOPHAGOSCOPY, GASTROSCOPY, DUODENOSCOPY (EGD), BIOPSY SINGLE OR MULTIPLE;  Surgeon: Natalie Gibbons MD;  Location:  GI     TONSILLECTOMY & ADENOIDECTOMY  1966    age 6     Current Outpatient Prescriptions   Medication Sig Dispense Refill     spironolactone (ALDACTONE) 50 MG tablet Take 50 mg by mouth 2 times daily       rizatriptan (MAXALT-MLT) 10 MG disintegrating tablet DISSOLVE 1  "TABLET ON TONGUE AT ONSET OF HEADACHE. MAY REPEAT IN 2 HOURS. MAX 3 TABLETS PER DAY 6 tablet 11     ACETAMINOPHEN PO        OTC products: None, except as noted above    Allergies   Allergen Reactions     Ibuprofen GI Disturbance     2 days of gi upset after taking any NSAID      Latex Allergy: NO    Social History   Substance Use Topics     Smoking status: Former Smoker     Packs/day: 1.00     Years: 5.00     Quit date: 3/20/1988     Smokeless tobacco: Never Used     Alcohol use 0.5 oz/week     1 drink(s) per week      Comment: rare     History   Drug Use No       REVIEW OF SYSTEMS:                                                    C: NEGATIVE for fever, chills, change in weight  E/M: NEGATIVE for ear, mouth and throat problems  R: NEGATIVE for significant cough or SOB  CV: NEGATIVE for chest pain, palpitations or peripheral edema    EXAM:                                                    /80 (BP Location: Left arm, Cuff Size: Adult Large)  Pulse 84  Temp 98.3  F (36.8  C) (Oral)  Resp 16  Ht 1.651 m (5' 5\")  Wt 125.2 kg (276 lb)  LMP 03/10/2013  SpO2 98%  BMI 45.93 kg/m2  GENERAL APPEARANCE: healthy, alert and no distress  HENT: ear canals and TM's normal and nose and mouth without ulcers or lesions  RESP: lungs clear to auscultation - no rales, rhonchi or wheezes  CV: regular rate and rhythm, normal S1 S2, no S3 or S4 and no murmur, click or rub   ABDOMEN: soft, nontender, no HSM or masses and bowel sounds normal  NEURO: Normal strength and tone, sensory exam grossly normal, mentation intact and speech normal    DIAGNOSTICS:                                                    HGB: 13.7 gm/dl    Recent Labs   Lab Test  04/30/16   1030  01/14/16   1926  07/22/14   0640   04/23/13   0602  04/17/13   1307   HGB  13.6   --   9.9*   < >  13.3   --    PLT  229   --   200   < >  214   --    INR   --    --    --    --   0.89   --    NA  141  139  142   < >  143   --    POTASSIUM  4.5  4.5  3.6   < >  4.4   " --    CR  0.68  0.84  0.92   < >  0.78   --    A1C   --    --    --    --    --   5.9    < > = values in this interval not displayed.        IMPRESSION:                                                    Diagnosis/reason for consult: (S89.92XD) Plantar plate injury, left, subsequent encounter  (primary encounter diagnosis)    (M20.42) Hammer toe of second toe of left foot    (Z01.818) Pre-op exam      The proposed surgical procedure is considered LOW risk.    REVISED CARDIAC RISK INDEX  The patient has the following serious cardiovascular risks for perioperative complications such as (MI, PE, VFib and 3  AV Block):  No serious cardiac risks  INTERPRETATION: 0 risks: Class I (very low risk - 0.4% complication rate)    The patient has the following additional risks for perioperative complications:  No identified additional risks        RECOMMENDATIONS:                                                      --Consult hospital rounder / IM to assist post-op medical management    --Patient is to take all scheduled medications on the day of surgery EXCEPT for modifications listed below.    APPROVAL GIVEN to proceed with proposed procedure, without further diagnostic evaluation       Signed Electronically by: Donna Alvarez MD    Copy of this evaluation report is provided to requesting physician.

## 2017-07-03 NOTE — MR AVS SNAPSHOT
After Visit Summary   7/3/2017    Rupal Puri    MRN: [id number]           Patient Information     Date Of Birth          1960        Visit Information        Provider Department      7/3/2017 8:30 AM Donna Alvarez MD [facility]        Today's Diagnoses     Plantar plate injury, left, subsequent encounter    -  1    Hammer toe of second toe of left foot        Pre-op exam          Care Instructions    Take copy to surgery site          Follow-ups after your visit        Follow-up notes from your care team     Return if symptoms worsen or fail to improve.      Who to contact     If you have questions or need follow up information about today's clinic visit or your schedule please contact [facility] directly at [phone].  Normal or non-critical lab and imaging results will be communicated to you by discoapihart, letter or phone within 4 business days after the clinic has received the results. If you do not hear from us within 7 days, please contact the clinic through discoapihart or phone. If you have a critical or abnormal lab result, we will notify you by phone as soon as possible.  Submit refill requests through relocality or call your pharmacy and they will forward the refill request to us. Please allow 3 business days for your refill to be completed.          Additional Information About Your Visit        MyChart Information     relocality gives you secure access to your electronic health record. If you see a primary care provider, you can also send messages to your care team and make appointments. If you have questions, please call your primary care clinic.  If you do not have a primary care provider, please call [phone] and they will assist you.        Care EveryWhere ID     This is your Care EveryWhere ID. This could be used by other organizations to access your [facility] medical records  [id number]        Your Vitals Were     Pulse  "Temperature Respirations Height Last Period Pulse Oximetry    84 98.3  F (36.8  C) (Oral) 16 1.651 m (5' 5\") 03/10/2013 98%    BMI (Body Mass Index)                   45.93 kg/m2            Blood Pressure from Last 3 Encounters:   07/03/17 120/80   06/23/17 110/60   03/01/17 132/80    Weight from Last 3 Encounters:   07/03/17 125.2 kg (276 lb)   06/23/17 123.8 kg (273 lb)   03/01/17 59.1 kg (130 lb 3.2 oz)              We Performed the Following     CL AFF HEMOGLOBIN (BFP)     VENOUS COLLECTION        Primary Care Provider Office Phone # Fax #    PAUL Smart [phone]       [facility]  [address]  [facility] [address]        Equal Access to Services     [facility]: Hadii aad ku hadasho Soomaali, waaxda luqadaha, qaybta kaalmada adeegyada, waxay oneliain haysimban jazmin morin . So [facility] [phone].    ATENCIÓN: Si habla español, tiene a jackson disposición servicios gratuitos de asistencia lingüística. Llame al [phone].    We comply with applicable federal civil rights laws and [place] laws. We do not discriminate on the basis of race, color, national origin, age, disability sex, sexual orientation or gender identity.            Thank you!     Thank you for choosing [facility]  for your care. Our goal is always to provide you with excellent care. Hearing back from our patients is one way we can continue to improve our services. Please take a few minutes to complete the written survey that you may receive in the mail after your visit with us. Thank you!             Your Updated Medication List - Protect others around you: Learn how to safely use, store and throw away your medicines at www.disposemymeds.org.          This list is accurate as of: 7/3/17  9:18 AM.  Always use your most recent med list.                   Brand Name Dispense Instructions for use Diagnosis    ACETAMINOPHEN PO           rizatriptan 10 MG ODT tab    " MAXALT-MLT    6 tablet    DISSOLVE 1 TABLET ON TONGUE AT ONSET OF HEADACHE. MAY REPEAT IN 2 HOURS. MAX 3 TABLETS PER DAY    Migraine without aura and without status migrainosus, not intractable       spironolactone 50 MG tablet    ALDACTONE     Take 50 mg by mouth 2 times daily

## 2017-07-03 NOTE — NURSING NOTE
Patient is here pre-op.  Pulse - regular  My Chart - accepts    CLASSIFICATION OF OVERWEIGHT AND OBESITY BY BMI                         Obesity Class           BMI(kg/m2)  Underweight                                    < 18.5  Normal                                         18.5-24.9  Overweight                                     25.0-29.9  OBESITY                     I                  30.0-34.9                              II                 35.0-39.9  EXTREME OBESITY             III                >40                             Patient's  BMI Body mass index is 45.93 kg/(m^2).  http://hin.nhlbi.nih.gov/menuplanner/menu.cgi  Questioned patient about current smoking habits.  Pt. has never smoked.

## 2017-07-10 DIAGNOSIS — G43.009 MIGRAINE WITHOUT AURA AND WITHOUT STATUS MIGRAINOSUS, NOT INTRACTABLE: ICD-10-CM

## 2017-07-10 RX ORDER — RIZATRIPTAN BENZOATE 10 MG/1
TABLET, ORALLY DISINTEGRATING ORAL
Qty: 6 TABLET | Refills: 0 | Status: SHIPPED | OUTPATIENT
Start: 2017-07-10 | End: 2017-12-11

## 2017-07-10 NOTE — TELEPHONE ENCOUNTER
Patient called and states that her mail order has been requesting a refill of the following med and they have not heard back from us.   [phone]    I called the patient and told her that we have not had any request. She just had foot surgery and states that she only has one pill left.  I told her that I would ask that you give her a 1 mo refill with the understanding that she would come in to see you before needing another refill.    Pending Prescriptions:                       Disp   Refills    rizatriptan (MAXALT-MLT) 10 MG ODT tab    6 tabl*0            Sig: DISSOLVE 1 TABLET ON TONGUE AT ONSET OF HEADACHE.           MAY REPEAT IN 2 HOURS. MAX 3 TABLETS PER DAY    Ready to be faxed when Rx is approved or denied.  Please return to me and I will call the patient back at   Telephone Information:   Mobile [phone]     . Thank-You,  Tonja

## 2017-08-16 ENCOUNTER — THERAPY VISIT (OUTPATIENT)
Dept: PHYSICAL THERAPY | Facility: CLINIC | Age: 57
End: 2017-08-16
Payer: COMMERCIAL

## 2017-08-16 DIAGNOSIS — M25.551 PAIN OF BOTH HIP JOINTS: Primary | ICD-10-CM

## 2017-08-16 DIAGNOSIS — M25.552 PAIN OF BOTH HIP JOINTS: Primary | ICD-10-CM

## 2017-08-16 PROCEDURE — 97110 THERAPEUTIC EXERCISES: CPT | Mod: GP | Performed by: PHYSICAL THERAPIST

## 2017-08-16 PROCEDURE — 97140 MANUAL THERAPY 1/> REGIONS: CPT | Mod: GP | Performed by: PHYSICAL THERAPIST

## 2017-08-16 PROCEDURE — 97161 PT EVAL LOW COMPLEX 20 MIN: CPT | Mod: GP | Performed by: PHYSICAL THERAPIST

## 2017-08-16 NOTE — PROGRESS NOTES
Rupal Puri is a 57 year old female patient presenting to Physical Therapy with the following Primary Symptoms: Bilateral hip pain along lateral hips, femoral crease and lateral thigh areas.  Bad enough that she's using crutches.  She denies having related symptoms spreading to the lower legs, no numbness. These pains are described as constant.   She denies having painful cough/sneeze, saddle anaesthesia, severe night pain, peripheral motor deficit, recent bowel/bladder change, recent vision change, ringing in the ears or pain with swallowing. Pain is rated 8/10 at rest, and 9/10 at worst. History of symptoms: These pains began gradually over the past five months as the result of no specific episode or injury.  Previous treatment has included Corticosteroid injection, self guided exercsies.   Since onset, these pains are getting worse :  Current Symptoms are worsened by: stepping into car, lifting legs up onto steps, lying on either side, walking any distance is painful. Current Symptoms are relieved by tramadol, injections.   Recent surgical procedure: none   General health as reported by patient is:  good.  Pertinent medical history: OA, apnea, Hx of L foot lig repair, bilat TKA.  She denies any significant current illness or recent hospital admissions. She denies any regional implanted devices in the hips.  Current occupational status: office work. Previous functional status:  fully functional prior to pain onset/injury.             HPI                    Objective:      Gait:    Gait Type:  Antalgic   Weight Bearing Status:  PWB   Assistive Devices:  Crutches  Deviations:  General Deviations:  Melvi decr and stance time decr    Flexibility/Screens:   Positive screens:  Lumbar (defaults to forward lean trunk flexion supported by hands after about 30 sec standing)    Lower Extremity:  Decreased left lower extremity flexibility:Piriformis; Hip Flexors and Hamstrings    Decreased right lower extremity  flexibility:  Piriformis; Hip Flexors and Hamstrings               Lumbar/SI Evaluation  ROM:    AROM Lumbar:   Flexion:        NL  Ext:                    50%   Side Bend:        Left:     Right:   Rotation:           Left:     Right:   Side Glide:        Left:     Right:                   Neural Tension/Mobility:      Left side:SLR or Slump  negative.     Right side:   Slump or SLR  negative.                                             Hip Evaluation        Hip Palpation:    Left hip tenderness present at:   Greater Trachanter; hip flexors and Gluteus Medius  Right hip tenderness present at:  Greater Trachanter; hip flexors and Gluteus Medius             General     ROS    Assessment/Plan:      Patient is a 57 year old female with both sides hip complaints.    Patient has the following significant findings with corresponding treatment plan.                Diagnosis 1:  bilat hip GrTrochanteric bursitis  Pain -  hot/cold therapy, manual therapy, splint/taping/bracing/orthotics, self management, education, directional preference exercise and home program  Decreased ROM/flexibility - manual therapy and therapeutic exercise  Decreased joint mobility - manual therapy and therapeutic exercise  Decreased strength - therapeutic exercise and therapeutic activities  Impaired balance - neuro re-education and therapeutic activities  Decreased proprioception - neuro re-education and therapeutic activities  Inflammation - cold therapy and self management/home program  Impaired gait - gait training  Impaired muscle performance - neuro re-education  Decreased function - therapeutic activities  Impaired posture - neuro re-education    Therapy Evaluation Codes:   1) History comprised of:   Personal factors that impact the plan of care:      Time since onset of symptoms.    Comorbidity factors that impact the plan of care are:      Osteoarthritis and Overweight.     Medications impacting care: None.  2) Examination of Body Systems  comprised of:   Body structures and functions that impact the plan of care:      Hip.   Activity limitations that impact the plan of care are:      Bending, Driving, Dressing, Sitting, Squatting/kneeling, Stairs, Standing, Walking, Sleeping and Laying down.  3) Clinical presentation characteristics are:   Stable/Uncomplicated.  4) Decision-Making    Low complexity using standardized patient assessment instrument and/or measureable assessment of functional outcome.  Cumulative Therapy Evaluation is: Low complexity.    Previous and current functional limitations:  (See Goal Flow Sheet for this information)    Short term and Long term goals: (See Goal Flow Sheet for this information)     Communication ability:  Patient appears to be able to clearly communicate and understand verbal and written communication and follow directions correctly.  Treatment Explanation - The following has been discussed with the patient:   RX ordered/plan of care  Anticipated outcomes  Possible risks and side effects  This patient would benefit from PT intervention to resume normal activities.   Rehab potential is excellent.    Frequency:  1 X week, once daily  Duration:  for 6-8 weeks  Discharge Plan:  Achieve all LTG.  Independent in home treatment program.  Reach maximal therapeutic benefit.    Please refer to the daily flowsheet for treatment today, total treatment time and time spent performing 1:1 timed codes.

## 2017-08-16 NOTE — LETTER
[facility] [place] [facility]  [address]  [address]  [place] [address]  [phone]    2017    Re: Rupal Puri   :   1960  MRN:  [id number]   [facility]IC[facility]    Date of Initial Evaluation:  8/15/2017  Visits:  Rxs Used: 1  Reason for Referral:  Pain of both hip joints    EVALUATION SUMMARY    Rupal Puri is a 57 year old female patient presenting to Physical Therapy with the following Primary Symptoms: Bilateral hip pain along lateral hips, femoral crease and lateral thigh areas.  Bad enough that she's using crutches.  She denies having related symptoms spreading to the lower legs, no numbness. These pains are described as constant.   She denies having painful cough/sneeze, saddle anaesthesia, severe night pain, peripheral motor deficit, recent bowel/bladder change, recent vision change, ringing in the ears or pain with swallowing. Pain is rated 8/10 at rest, and 9/10 at worst. History of symptoms: These pains began gradually over the past five months as the result of no specific episode or injury.  Previous treatment has included Corticosteroid injection, self guided exercsies.   Since onset, these pains are getting worse :  Current Symptoms are worsened by: stepping into car, lifting legs up onto steps, lying on either side, walking any distance is painful. Current Symptoms are relieved by tramadol, injections.   Recent surgical procedure: none   General health as reported by patient is:  good.  Pertinent medical history: OA, apnea, Hx of L foot lig repair, bilat TKA.  She denies any significant current illness or recent hospital admissions. She denies any regional implanted devices in the hips.  Current occupational status: office work. Previous functional status:  fully functional prior to pain onset/injury.           Objective:  Gait:    Gait Type:  Antalgic   Weight Bearing Status:  PWB   Assistive Devices:   Crutches  Deviations:  General Deviations:  Melvi decr and stance time decr  Flexibility/Screens:   Positive screens:  Lumbar (defaults to forward lean trunk flexion supported by hands after about 30 sec standing)  Lower Extremity:  Decreased left lower extremity flexibility:Piriformis; Hip Flexors and Hamstrings  Decreased right lower extremity flexibility:  Piriformis; Hip Flexors and Hamstrings       Lumbar/SI Evaluation  ROM:    AROM Lumbar:   Flexion:        NL  Ext:                    50%   Side Bend:        Left:     Right:   Rotation:           Left:     Right:   Side Glide:        Left:     Right:           Re: Rupal Puri   :   1960    Neural Tension/Mobility:    Left side:SLR or Slump  negative.   Right side:   Slump or SLR  negative.        Hip Evaluation  Hip Palpation:    Left hip tenderness present at:   Greater Trachanter; hip flexors and Gluteus Medius  Right hip tenderness present at:  Greater Trachanter; hip flexors and Gluteus Medius      Assessment/Plan:      Patient is a 57 year old female with both sides hip complaints.    Patient has the following significant findings with corresponding treatment plan.                Diagnosis 1:  bilat hip GrTrochanteric bursitis  Pain -  hot/cold therapy, manual therapy, splint/taping/bracing/orthotics, self management, education, directional preference exercise and home program  Decreased ROM/flexibility - manual therapy and therapeutic exercise  Decreased joint mobility - manual therapy and therapeutic exercise  Decreased strength - therapeutic exercise and therapeutic activities  Impaired balance - neuro re-education and therapeutic activities  Decreased proprioception - neuro re-education and therapeutic activities  Inflammation - cold therapy and self management/home program  Impaired gait - gait training  Impaired muscle performance - neuro re-education  Decreased function - therapeutic activities  Impaired posture - neuro  re-education    Therapy Evaluation Codes:   1) History comprised of:   Personal factors that impact the plan of care:      Time since onset of symptoms.    Comorbidity factors that impact the plan of care are:      Osteoarthritis and Overweight.     Medications impacting care: None.  2) Examination of Body Systems comprised of:   Body structures and functions that impact the plan of care:      Hip.   Activity limitations that impact the plan of care are:      Bending, Driving, Dressing, Sitting, Squatting/kneeling, Stairs, Standing, Walking, Sleeping and Laying down.  3) Clinical presentation characteristics are:   Stable/Uncomplicated.  4) Decision-Making    Low complexity using standardized patient assessment instrument and/or measureable assessment of functional outcome.  Cumulative Therapy Evaluation is: Low complexity.    Previous and current functional limitations:  (See Goal Flow Sheet for this information)    Short term and Long term goals: (See Goal Flow Sheet for this information)   Communication ability:  Patient appears to be able to clearly communicate and understand verbal and written communication and follow directions correctly.  Treatment Explanation - The following has been discussed with the patient:   RX ordered/plan of care  Anticipated outcomes  Possible risks and side effects  Re: Rupal Puri   :   1960    This patient would benefit from PT intervention to resume normal activities.   Rehab potential is excellent.    Frequency:  1 X week, once daily  Duration:  for 6-8 weeks  Discharge Plan:  Achieve all LTG.  Independent in home treatment program.  Reach maximal therapeutic benefit.        Thank you for your referral.    INQUIRIES  Therapist:  Paul Breyen, MA, PT, OCS, Cert, MDT  [facility]  [address] [address] [address]  Phone: [phone]  Fax: [phone]

## 2017-08-18 ENCOUNTER — THERAPY VISIT (OUTPATIENT)
Dept: PHYSICAL THERAPY | Facility: CLINIC | Age: 57
End: 2017-08-18
Payer: COMMERCIAL

## 2017-08-18 ENCOUNTER — HOSPITAL ENCOUNTER (OUTPATIENT)
Dept: MAMMOGRAPHY | Facility: CLINIC | Age: 57
Discharge: HOME | End: 2017-08-18
Attending: OBSTETRICS & GYNECOLOGY | Admitting: OBSTETRICS & GYNECOLOGY
Payer: COMMERCIAL

## 2017-08-18 ENCOUNTER — OFFICE VISIT (OUTPATIENT)
Dept: FAMILY MEDICINE | Facility: CLINIC | Age: 57
End: 2017-08-18

## 2017-08-18 VITALS
DIASTOLIC BLOOD PRESSURE: 70 MMHG | WEIGHT: 266.4 LBS | SYSTOLIC BLOOD PRESSURE: 102 MMHG | TEMPERATURE: 98.4 F | HEART RATE: 45 BPM | BODY MASS INDEX: 44.33 KG/M2 | OXYGEN SATURATION: 98 %

## 2017-08-18 DIAGNOSIS — M25.552 PAIN OF BOTH HIP JOINTS: ICD-10-CM

## 2017-08-18 DIAGNOSIS — Z12.31 VISIT FOR SCREENING MAMMOGRAM: ICD-10-CM

## 2017-08-18 DIAGNOSIS — M25.551 PAIN OF BOTH HIP JOINTS: ICD-10-CM

## 2017-08-18 DIAGNOSIS — R10.30 GROIN PAIN, UNSPECIFIED LATERALITY: ICD-10-CM

## 2017-08-18 DIAGNOSIS — M79.604 RIGHT LEG PAIN: Primary | ICD-10-CM

## 2017-08-18 PROCEDURE — G0202 SCR MAMMO BI INCL CAD: HCPCS

## 2017-08-18 PROCEDURE — 97140 MANUAL THERAPY 1/> REGIONS: CPT | Mod: GP | Performed by: PHYSICAL THERAPIST

## 2017-08-18 PROCEDURE — 99213 OFFICE O/P EST LOW 20 MIN: CPT | Performed by: PHYSICIAN ASSISTANT

## 2017-08-18 PROCEDURE — 97110 THERAPEUTIC EXERCISES: CPT | Mod: GP | Performed by: PHYSICAL THERAPIST

## 2017-08-18 RX ORDER — CYCLOBENZAPRINE HCL 10 MG
5-10 TABLET ORAL 3 TIMES DAILY PRN
Qty: 30 TABLET | Refills: 1 | Status: SHIPPED | OUTPATIENT
Start: 2017-08-18 | End: 2018-01-05

## 2017-08-18 NOTE — PROGRESS NOTES
SUBJECTIVE:                                                    Rupal Puri is a 57 year old female who presents to clinic today for the following health issues:    Rupal is here with back pain.  Was seen by Dr Lyon - put in boot on 7/31.  Since wearing boot has developed bilateral thigh pain. Pt states that she has been walking differently with boot.   She has hx of hip pain with cortisone shots on 7/6 for bursitis.  Was referred to PT.  Did have PT this am. There is slight radiation of pain from back/hip into the groin, right more than left. Denies bowel/bladder incont.     Labs reviewed in EPIC  BP Readings from Last 3 Encounters:   08/18/17 102/70   07/03/17 120/80   06/23/17 110/60    Wt Readings from Last 3 Encounters:   08/18/17 120.8 kg (266 lb 6.4 oz)   07/03/17 125.2 kg (276 lb)   06/23/17 123.8 kg (273 lb)                  Patient Active Problem List   Diagnosis     Migraine     CARDIOVASCULAR SCREENING; LDL GOAL LESS THAN 130     Family history of ischemic heart disease     Family history of diabetes mellitus     Mixed hyperlipidemia     Abnormal glucose     Metabolic syndrome     Cough variant asthma     Health Care Home     Arthritis of knee     Knee pain     Cellulitis     History of GI bleed 2014     ACP (advance care planning)     Abnormal cardiovascular stress test     Chest pain     Morbid obesity due to excess calories (H)     Shoulder pain     Lateral epicondylitis     Chronic pain of right knee     Internal hemorrhoids - per 2017 colonoscopy     Benign neoplasm of cecum 2017 colonoscopy - repeat 2022     Pain of both hip joints     Past Surgical History:   Procedure Laterality Date     ARTHROPLASTY KNEE  4/23/2013    Procedure: ARTHROPLASTY KNEE;  RIGHT TOTAL KNEE ARTHROPLASTY (IFEOMA)^;  Surgeon: Simon Pitts MD;  Location:  OR     ARTHROPLASTY KNEE  7/17/2014    Procedure: ARTHROPLASTY KNEE;  Surgeon: Simon Pitts MD;  Location:  OR     AS REPAIR FEMORAL HERNIA,REDUCIBLE   1991    hernia repair femoral     ESOPHAGOSCOPY, GASTROSCOPY, DUODENOSCOPY (EGD), COMBINED  7/22/2014    Procedure: COMBINED ESOPHAGOSCOPY, GASTROSCOPY, DUODENOSCOPY (EGD), BIOPSY SINGLE OR MULTIPLE;  Surgeon: Natalie Gibbons MD;  Location:  GI     TONSILLECTOMY & ADENOIDECTOMY  1966    age 6       Social History   Substance Use Topics     Smoking status: Former Smoker     Packs/day: 1.00     Years: 5.00     Quit date: 3/20/1988     Smokeless tobacco: Never Used     Alcohol use 0.5 oz/week     1 Standard drinks or equivalent per week      Comment: rare     Family History   Problem Relation Age of Onset     OSTEOPOROSIS Mother      CANCER Father      kidney     C.A.D. Father      early disease     Gout Father      Neurologic Disorder Maternal Grandmother      TIA's     DIABETES Maternal Grandmother      Lipids Brother      Cancer - colorectal No family hx of      Breast Cancer No family hx of      Anesthesia Reaction No family hx of      Crohn Disease No family hx of      Ulcerative Colitis No family hx of      Colon Polyps No family hx of          Current Outpatient Prescriptions   Medication Sig Dispense Refill     cyclobenzaprine (FLEXERIL) 10 MG tablet Take 0.5-1 tablets (5-10 mg) by mouth 3 times daily as needed for muscle spasms 30 tablet 1     rizatriptan (MAXALT-MLT) 10 MG ODT tab DISSOLVE 1 TABLET ON TONGUE AT ONSET OF HEADACHE. MAY REPEAT IN 2 HOURS. MAX 3 TABLETS PER DAY 6 tablet 0     spironolactone (ALDACTONE) 50 MG tablet Take 50 mg by mouth 2 times daily       ACETAMINOPHEN PO        Allergies   Allergen Reactions     Ibuprofen GI Disturbance     2 days of gi upset after taking any NSAID     Recent Labs   Lab Test  05/17/16   1011  04/30/16   1030  01/14/16   1926  07/22/14   0640   04/17/13   1307 12/20/10   A1C   --    --    --    --    --   5.9   --    LDL  112*   --    --    --    --    --   102   HDL  38*   --    --    --    --    --   34*   TRIG  168*   --    --    --    --    --   214*    ALT  29  46   --    --    --    --   27   CR   --   0.68  0.84  0.92   < >   --   0.78   GFRESTIMATED   --   89  79  64   < >   --   >60   GFRESTBLACK   --   >90   GFR Calc     --   77   < >   --    --    POTASSIUM   --   4.5  4.5  3.6   < >   --   4.6   TSH  1.28   --    --    --    --    --    --     < > = values in this interval not displayed.              OBJECTIVE:   /70 (BP Location: Right arm, Patient Position: Chair, Cuff Size: Adult Large)  Pulse (!) 45  Temp 98.4  F (36.9  C) (Oral)  Wt 120.8 kg (266 lb 6.4 oz)  LMP 03/10/2013  SpO2 98%  Breastfeeding? No  BMI 44.33 kg/m2   Body mass index is 44.33 kg/(m^2).       Gait is normal.     Back:  Skin without ecchymosis, erythema or swelling.  Thoracic and lumbar spine non-tender to palpation.  SI Joints:  Right and left SI joints non-tender  ROM:  Normal flexion, extension, rotation, lateral bending  Strength  - there is decreased strength of hip flexor of the right leg.   Normal sensation in legs to light tough bilaterally  SLR Negative bilaterally   Patellar reflexes 0 (bilateral knee replacement)          ASSESSMENT/PLAN:                                                        ICD-10-CM    1. Right leg pain M79.604 [facility]     cyclobenzaprine (FLEXERIL) 10 MG tablet   2. Groin pain, unspecified laterality R10.30 [facility]     cyclobenzaprine (FLEXERIL) 10 MG tablet       No indication for imaging.  Suspect musculoskeletal pain due to modified walking with book.  I have prescribed Flexeril.  I did discuss the adverse effects of this medication and advised not to take this while driving, drinking alcohol, watching children, or making important decisions.  Referred to [facility]  Will refer back to ortho if not improving with PT.       Denia Salomon PA-C  [facility]

## 2017-08-18 NOTE — MR AVS SNAPSHOT
After Visit Summary   8/18/2017    Rupal Puri    MRN: [id number]           Patient Information     Date Of Birth          1960        Visit Information        Provider Department      8/18/2017 11:45 AM Denia Salomon PA [facility]        Today's Diagnoses     Right leg pain    -  1    Groin pain, unspecified laterality           Follow-ups after your visit        Additional Services     KAREN PT, HAND, AND CHIROPRACTIC REFERRAL       **This order will print in the [facility] Scheduling Office**    Physical Therapy, Hand Therapy and Chiropractic Care are available through:    *[facility]  *[facility]  *[facility]    Call one number to schedule at any of the above locations: [phone].    Your provider has referred you to: Physical Therapy at [facility] or [facility]    Indication/Reason for Referral: right and left groin and right leg pain  Onset of Illness: 1st Aug  Therapy Orders: Evaluate and Treat  Special Programs: None  Special Request: None    Abdias Calero      Additional Comments for the Therapist or Chiropractor: none    Please be aware that coverage of these services is subject to the terms and limitations of your health insurance plan.  Call member services at your health plan with any benefit or coverage questions.      Please bring the following to your appointment:    *Your personal calendar for scheduling future appointments  *Comfortable clothing                  Your next 10 appointments already scheduled     Aug 29, 2017  3:50 PM CDT   KAREN Extremity with Paul Breyen, PT   KAREN KUMARI PT ([facility]  )    [address]  [address] [place] [address]   [phone]            Sep 08, 2017  2:40 PM CDT   KAREN Extremity with Paul Breyen, PT   KARENROSE MARIE KUMARI PT ([facility]  )    [address]  [address] [place] [address]   [phone]            Sep 12, 2017  2:30 PM  CDT   KAREN Extremity with Paul Breyen, PT   KAREN GRIS KUMARI PT ([facility] [place]  )    [address]  [address]  [facility] [address]   [phone]            Sep 18, 2017  8:40 AM CDT   KAREN Extremity with Paul Breyen, PT   KAREN RS [facility] PT ([facility] [place]  )    [address]  [address]  [facility] [address]   [phone]            Sep 21, 2017  3:10 PM CDT   KAREN Extremity with Paul Breyen, PT   [facility]AREN [facility] ([facility] [place]  )    [address]  [address]  [facility] [address]   [phone]              Who to contact     If you have questions or need follow up information about today's clinic visit or your schedule please contact [facility] directly at [phone].  Normal or non-critical lab and imaging results will be communicated to you by CloudAcademyhart, letter or phone within 4 business days after the clinic has received the results. If you do not hear from us within 7 days, please contact the clinic through CloudAcademyhart or phone. If you have a critical or abnormal lab result, we will notify you by phone as soon as possible.  Submit refill requests through FusionOps or call your pharmacy and they will forward the refill request to us. Please allow 3 business days for your refill to be completed.          Additional Information About Your Visit        CloudAcademyhart Information     FusionOps gives you secure access to your electronic health record. If you see a primary care provider, you can also send messages to your care team and make appointments. If you have questions, please call your primary care clinic.  If you do not have a primary care provider, please call [phone] and they will assist you.        Care EveryWhere ID     This is your Care EveryWhere ID. This could be used by other organizations to access your [facility] medical records  [id number]        Your Vitals Were     Pulse Temperature Last Period Pulse Oximetry Breastfeeding? BMI (Body Mass Index)    45  98.4  F (36.9  C) (Oral) 03/10/2013 98% No 44.33 kg/m2       Blood Pressure from Last 3 Encounters:   08/18/17 102/70   07/03/17 120/80   06/23/17 110/60    Weight from Last 3 Encounters:   08/18/17 120.8 kg (266 lb 6.4 oz)   07/03/17 125.2 kg (276 lb)   06/23/17 123.8 kg (273 lb)              We Performed the Following     KAREN PT, HAND, AND CHIROPRACTIC REFERRAL          Today's Medication Changes          These changes are accurate as of: 8/18/17 11:59 PM.  If you have any questions, ask your nurse or doctor.               Start taking these medicines.        Dose/Directions    cyclobenzaprine 10 MG tablet   Commonly known as:  FLEXERIL   Used for:  Groin pain, unspecified laterality, Right leg pain   Started by:  Denia Salomon PA        Dose:  5-10 mg   Take 0.5-1 tablets (5-10 mg) by mouth 3 times daily as needed for muscle spasms   Quantity:  30 tablet   Refills:  1            Where to get your medicines      These medications were sent to [facility]/pharmacy #[id number] - [facility] [address], [place] [address]     Phone:  [phone]     cyclobenzaprine 10 MG tablet                Primary Care Provider Office Phone # Fax #    PAUL Smart [phone] [address] [place] [address]        Equal Access to Services     NAYELI MARIE AH: Hadii santi ku hadasho Soomaali, waaxda luqadaha, qaybta kaalmada adeegyada, waxay marisela martini. So [facility] [phone].    ATENCIÓN: Si lakeshiala blanca, tiene a jackson disposición servicios gratuitos de asistencia lingüística. Llame al [phone].    We comply with applicable federal civil rights laws and [place] laws. We do not discriminate on the basis of race, color, national origin, age, disability sex, sexual orientation or gender identity.            Thank you!     Thank you for choosing [facility]  for your care. Our goal is always to provide  you with excellent care. Hearing back from our patients is one way we can continue to improve our services. Please take a few minutes to complete the written survey that you may receive in the mail after your visit with us. Thank you!             Your Updated Medication List - Protect others around you: Learn how to safely use, store and throw away your medicines at www.disposemymeds.org.          This list is accurate as of: 8/18/17 11:59 PM.  Always use your most recent med list.                   Brand Name Dispense Instructions for use Diagnosis    ACETAMINOPHEN PO           cyclobenzaprine 10 MG tablet    FLEXERIL    30 tablet    Take 0.5-1 tablets (5-10 mg) by mouth 3 times daily as needed for muscle spasms    Groin pain, unspecified laterality, Right leg pain       rizatriptan 10 MG ODT tab    MAXALT-MLT    6 tablet    DISSOLVE 1 TABLET ON TONGUE AT ONSET OF HEADACHE. MAY REPEAT IN 2 HOURS. MAX 3 TABLETS PER DAY    Migraine without aura and without status migrainosus, not intractable       spironolactone 50 MG tablet    ALDACTONE     Take 50 mg by mouth 2 times daily

## 2017-08-22 ENCOUNTER — THERAPY VISIT (OUTPATIENT)
Dept: PHYSICAL THERAPY | Facility: CLINIC | Age: 57
End: 2017-08-22
Payer: COMMERCIAL

## 2017-08-22 DIAGNOSIS — M25.551 PAIN OF BOTH HIP JOINTS: ICD-10-CM

## 2017-08-22 DIAGNOSIS — M25.552 PAIN OF BOTH HIP JOINTS: ICD-10-CM

## 2017-08-22 PROCEDURE — 97110 THERAPEUTIC EXERCISES: CPT | Mod: GP | Performed by: PHYSICAL THERAPIST

## 2017-08-22 PROCEDURE — 97140 MANUAL THERAPY 1/> REGIONS: CPT | Mod: GP | Performed by: PHYSICAL THERAPIST

## 2017-08-25 ENCOUNTER — THERAPY VISIT (OUTPATIENT)
Dept: PHYSICAL THERAPY | Facility: CLINIC | Age: 57
End: 2017-08-25
Payer: COMMERCIAL

## 2017-08-25 DIAGNOSIS — M25.552 PAIN OF BOTH HIP JOINTS: ICD-10-CM

## 2017-08-25 DIAGNOSIS — M25.551 PAIN OF BOTH HIP JOINTS: ICD-10-CM

## 2017-08-25 PROCEDURE — 97530 THERAPEUTIC ACTIVITIES: CPT | Mod: GP | Performed by: PHYSICAL THERAPIST

## 2017-08-25 PROCEDURE — 97110 THERAPEUTIC EXERCISES: CPT | Mod: GP | Performed by: PHYSICAL THERAPIST

## 2017-08-28 ENCOUNTER — TELEPHONE (OUTPATIENT)
Dept: FAMILY MEDICINE | Facility: CLINIC | Age: 57
End: 2017-08-28

## 2017-08-29 ENCOUNTER — THERAPY VISIT (OUTPATIENT)
Dept: PHYSICAL THERAPY | Facility: CLINIC | Age: 57
End: 2017-08-29
Payer: COMMERCIAL

## 2017-08-29 DIAGNOSIS — M25.552 PAIN OF BOTH HIP JOINTS: ICD-10-CM

## 2017-08-29 DIAGNOSIS — M25.551 PAIN OF BOTH HIP JOINTS: ICD-10-CM

## 2017-08-29 PROCEDURE — 97110 THERAPEUTIC EXERCISES: CPT | Mod: GP | Performed by: PHYSICAL THERAPIST

## 2017-09-05 ENCOUNTER — ALLIED HEALTH/NURSE VISIT (OUTPATIENT)
Dept: FAMILY MEDICINE | Facility: CLINIC | Age: 57
End: 2017-09-05

## 2017-09-05 DIAGNOSIS — Z23 NEED FOR VACCINATION: Primary | ICD-10-CM

## 2017-09-05 PROCEDURE — 90471 IMMUNIZATION ADMIN: CPT | Performed by: PHYSICIAN ASSISTANT

## 2017-09-05 PROCEDURE — 90686 IIV4 VACC NO PRSV 0.5 ML IM: CPT | Performed by: PHYSICIAN ASSISTANT

## 2017-09-07 ENCOUNTER — TRANSFERRED RECORDS (OUTPATIENT)
Dept: FAMILY MEDICINE | Facility: CLINIC | Age: 57
End: 2017-09-07

## 2017-09-08 ENCOUNTER — THERAPY VISIT (OUTPATIENT)
Dept: PHYSICAL THERAPY | Facility: CLINIC | Age: 57
End: 2017-09-08
Payer: COMMERCIAL

## 2017-09-08 DIAGNOSIS — M25.551 PAIN OF BOTH HIP JOINTS: ICD-10-CM

## 2017-09-08 DIAGNOSIS — M25.552 PAIN OF BOTH HIP JOINTS: ICD-10-CM

## 2017-09-08 PROCEDURE — 97110 THERAPEUTIC EXERCISES: CPT | Mod: GP | Performed by: PHYSICAL THERAPIST

## 2017-09-12 ENCOUNTER — THERAPY VISIT (OUTPATIENT)
Dept: PHYSICAL THERAPY | Facility: CLINIC | Age: 57
End: 2017-09-12
Payer: COMMERCIAL

## 2017-09-12 DIAGNOSIS — M25.552 PAIN OF BOTH HIP JOINTS: ICD-10-CM

## 2017-09-12 DIAGNOSIS — M25.551 PAIN OF BOTH HIP JOINTS: ICD-10-CM

## 2017-09-12 PROCEDURE — 97110 THERAPEUTIC EXERCISES: CPT | Mod: GP | Performed by: PHYSICAL THERAPIST

## 2017-09-21 ENCOUNTER — THERAPY VISIT (OUTPATIENT)
Dept: PHYSICAL THERAPY | Facility: CLINIC | Age: 57
End: 2017-09-21
Payer: COMMERCIAL

## 2017-09-21 DIAGNOSIS — M25.552 PAIN OF BOTH HIP JOINTS: ICD-10-CM

## 2017-09-21 DIAGNOSIS — M25.551 PAIN OF BOTH HIP JOINTS: ICD-10-CM

## 2017-09-21 PROCEDURE — 97140 MANUAL THERAPY 1/> REGIONS: CPT | Mod: GP | Performed by: PHYSICAL THERAPIST

## 2017-09-21 PROCEDURE — 97110 THERAPEUTIC EXERCISES: CPT | Mod: GP | Performed by: PHYSICAL THERAPIST

## 2017-10-18 ENCOUNTER — THERAPY VISIT (OUTPATIENT)
Dept: PHYSICAL THERAPY | Facility: CLINIC | Age: 57
End: 2017-10-18
Payer: COMMERCIAL

## 2017-10-18 DIAGNOSIS — M25.552 PAIN OF BOTH HIP JOINTS: ICD-10-CM

## 2017-10-18 DIAGNOSIS — M25.551 PAIN OF BOTH HIP JOINTS: ICD-10-CM

## 2017-10-18 PROCEDURE — 97110 THERAPEUTIC EXERCISES: CPT | Mod: GP | Performed by: PHYSICAL THERAPIST

## 2017-10-18 PROCEDURE — 97112 NEUROMUSCULAR REEDUCATION: CPT | Mod: GP | Performed by: PHYSICAL THERAPIST

## 2017-11-07 ENCOUNTER — THERAPY VISIT (OUTPATIENT)
Dept: PHYSICAL THERAPY | Facility: CLINIC | Age: 57
End: 2017-11-07
Payer: COMMERCIAL

## 2017-11-07 DIAGNOSIS — M25.552 PAIN OF BOTH HIP JOINTS: ICD-10-CM

## 2017-11-07 DIAGNOSIS — M25.551 PAIN OF BOTH HIP JOINTS: ICD-10-CM

## 2017-11-07 PROCEDURE — 97140 MANUAL THERAPY 1/> REGIONS: CPT | Mod: GP | Performed by: PHYSICAL THERAPIST

## 2017-11-07 PROCEDURE — 97110 THERAPEUTIC EXERCISES: CPT | Mod: GP | Performed by: PHYSICAL THERAPIST

## 2017-11-07 PROCEDURE — 97112 NEUROMUSCULAR REEDUCATION: CPT | Mod: GP | Performed by: PHYSICAL THERAPIST

## 2017-11-14 ENCOUNTER — THERAPY VISIT (OUTPATIENT)
Dept: PHYSICAL THERAPY | Facility: CLINIC | Age: 57
End: 2017-11-14
Payer: COMMERCIAL

## 2017-11-14 DIAGNOSIS — M25.552 PAIN OF BOTH HIP JOINTS: ICD-10-CM

## 2017-11-14 DIAGNOSIS — M25.551 PAIN OF BOTH HIP JOINTS: ICD-10-CM

## 2017-11-14 PROCEDURE — 97110 THERAPEUTIC EXERCISES: CPT | Mod: GP | Performed by: PHYSICAL THERAPIST

## 2017-11-14 PROCEDURE — 97140 MANUAL THERAPY 1/> REGIONS: CPT | Mod: GP | Performed by: PHYSICAL THERAPIST

## 2017-11-21 ENCOUNTER — THERAPY VISIT (OUTPATIENT)
Dept: PHYSICAL THERAPY | Facility: CLINIC | Age: 57
End: 2017-11-21
Payer: COMMERCIAL

## 2017-11-21 DIAGNOSIS — M25.552 PAIN OF BOTH HIP JOINTS: ICD-10-CM

## 2017-11-21 DIAGNOSIS — M25.551 PAIN OF BOTH HIP JOINTS: ICD-10-CM

## 2017-11-21 PROCEDURE — 97110 THERAPEUTIC EXERCISES: CPT | Mod: GP | Performed by: PHYSICAL THERAPIST

## 2017-12-01 ENCOUNTER — THERAPY VISIT (OUTPATIENT)
Dept: PHYSICAL THERAPY | Facility: CLINIC | Age: 57
End: 2017-12-01
Payer: COMMERCIAL

## 2017-12-01 DIAGNOSIS — M25.551 PAIN OF BOTH HIP JOINTS: ICD-10-CM

## 2017-12-01 DIAGNOSIS — M25.552 PAIN OF BOTH HIP JOINTS: ICD-10-CM

## 2017-12-01 PROCEDURE — 97140 MANUAL THERAPY 1/> REGIONS: CPT | Mod: GP | Performed by: PHYSICAL THERAPIST

## 2017-12-01 PROCEDURE — 97110 THERAPEUTIC EXERCISES: CPT | Mod: GP | Performed by: PHYSICAL THERAPIST

## 2017-12-05 ENCOUNTER — THERAPY VISIT (OUTPATIENT)
Dept: PHYSICAL THERAPY | Facility: CLINIC | Age: 57
End: 2017-12-05
Payer: COMMERCIAL

## 2017-12-05 DIAGNOSIS — M25.552 PAIN OF BOTH HIP JOINTS: ICD-10-CM

## 2017-12-05 DIAGNOSIS — M25.551 PAIN OF BOTH HIP JOINTS: ICD-10-CM

## 2017-12-05 PROCEDURE — 97110 THERAPEUTIC EXERCISES: CPT | Mod: GP | Performed by: PHYSICAL THERAPIST

## 2017-12-10 DIAGNOSIS — G43.009 MIGRAINE WITHOUT AURA AND WITHOUT STATUS MIGRAINOSUS, NOT INTRACTABLE: ICD-10-CM

## 2017-12-11 RX ORDER — RIZATRIPTAN BENZOATE 10 MG/1
TABLET, ORALLY DISINTEGRATING ORAL
Qty: 18 TABLET | Refills: 3 | OUTPATIENT
Start: 2017-12-11

## 2017-12-11 RX ORDER — RIZATRIPTAN BENZOATE 10 MG/1
TABLET, ORALLY DISINTEGRATING ORAL
Qty: 18 TABLET | Refills: 3 | Status: SHIPPED | OUTPATIENT
Start: 2017-12-11 | End: 2019-10-04

## 2017-12-11 NOTE — TELEPHONE ENCOUNTER
Rupal Puri is requesting a refill of:    Pending Prescriptions:                       Disp   Refills    rizatriptan (MAXALT-MLT) 10 MG ODT tab [P*6 tabl*0            Sig: DISSOLVE 1 TABLET ON TONGUE AT ONSET OF HEADACHE.           MAY REPEAT IN 2 HOURS. MAX 3 TABLETS PER DAY    Please close encounter if RX was sent. Thanks, Charmaine

## 2017-12-11 NOTE — TELEPHONE ENCOUNTER
Migraines - barometric changes only  Pt notified due for fasting CPE    Denia Salomon PA-C  12/11/2017

## 2018-01-05 ENCOUNTER — OFFICE VISIT (OUTPATIENT)
Dept: FAMILY MEDICINE | Facility: CLINIC | Age: 58
End: 2018-01-05

## 2018-01-05 VITALS
WEIGHT: 275.8 LBS | BODY MASS INDEX: 44.32 KG/M2 | HEIGHT: 66 IN | DIASTOLIC BLOOD PRESSURE: 76 MMHG | HEART RATE: 71 BPM | TEMPERATURE: 98.1 F | OXYGEN SATURATION: 98 % | SYSTOLIC BLOOD PRESSURE: 112 MMHG

## 2018-01-05 DIAGNOSIS — E66.01 MORBID OBESITY (H): ICD-10-CM

## 2018-01-05 DIAGNOSIS — J45.991 COUGH VARIANT ASTHMA: ICD-10-CM

## 2018-01-05 DIAGNOSIS — G43.009 MIGRAINE WITHOUT AURA AND WITHOUT STATUS MIGRAINOSUS, NOT INTRACTABLE: ICD-10-CM

## 2018-01-05 DIAGNOSIS — R73.09 ABNORMAL GLUCOSE: ICD-10-CM

## 2018-01-05 DIAGNOSIS — E78.2 MIXED HYPERLIPIDEMIA: ICD-10-CM

## 2018-01-05 DIAGNOSIS — Z00.00 ROUTINE HISTORY AND PHYSICAL EXAMINATION OF ADULT: Primary | ICD-10-CM

## 2018-01-05 LAB — GLUCOSE SERPL-MCNC: 87 MG/DL (ref 60–99)

## 2018-01-05 PROCEDURE — 99396 PREV VISIT EST AGE 40-64: CPT | Performed by: PHYSICIAN ASSISTANT

## 2018-01-05 PROCEDURE — 36415 COLL VENOUS BLD VENIPUNCTURE: CPT | Performed by: PHYSICIAN ASSISTANT

## 2018-01-05 PROCEDURE — 82947 ASSAY GLUCOSE BLOOD QUANT: CPT | Performed by: PHYSICIAN ASSISTANT

## 2018-01-05 PROCEDURE — 80061 LIPID PANEL: CPT | Mod: 90 | Performed by: PHYSICIAN ASSISTANT

## 2018-01-05 NOTE — MR AVS SNAPSHOT
After Visit Summary   1/5/2018    Rupal Puri    MRN: [id number]           Patient Information     Date Of Birth          1960        Visit Information        Provider Department      1/5/2018 9:30 AM Denia Salomon PA [facility]        Today's Diagnoses     Routine history and physical examination of adult    -  1    Mixed hyperlipidemia        Abnormal glucose        Cough variant asthma        Migraine without aura and without status migrainosus, not intractable        Morbid obesity (H)           Follow-ups after your visit        Who to contact     If you have questions or need follow up information about today's clinic visit or your schedule please contact [facility] directly at [phone].  Normal or non-critical lab and imaging results will be communicated to you by PPLCONNECThart, letter or phone within 4 business days after the clinic has received the results. If you do not hear from us within 7 days, please contact the clinic through PPLCONNECThart or phone. If you have a critical or abnormal lab result, we will notify you by phone as soon as possible.  Submit refill requests through 2Duche or call your pharmacy and they will forward the refill request to us. Please allow 3 business days for your refill to be completed.          Additional Information About Your Visit        MyChart Information     2Duche gives you secure access to your electronic health record. If you see a primary care provider, you can also send messages to your care team and make appointments. If you have questions, please call your primary care clinic.  If you do not have a primary care provider, please call [phone] and they will assist you.        Care EveryWhere ID     This is your Care EveryWhere ID. This could be used by other organizations to access your [facility] medical records  [id number]        Your Vitals Were     Pulse Temperature Height Last  "Period Pulse Oximetry Breastfeeding?    71 98.1  F (36.7  C) (Oral) 1.67 m (5' 5.75\") 03/10/2013 98% No    BMI (Body Mass Index)                   44.85 kg/m2            Blood Pressure from Last 3 Encounters:   01/05/18 112/76   08/18/17 102/70   07/03/17 120/80    Weight from Last 3 Encounters:   01/05/18 125.1 kg (275 lb 12.8 oz)   08/18/17 120.8 kg (266 lb 6.4 oz)   07/03/17 125.2 kg (276 lb)              We Performed the Following     Glucose Fasting (BFP)     Lipid Profile     VENOUS COLLECTION          Today's Medication Changes          These changes are accurate as of: 1/5/18 10:16 AM.  If you have any questions, ask your nurse or doctor.               Stop taking these medicines if you haven't already. Please contact your care team if you have questions.     spironolactone 50 MG tablet   Commonly known as:  ALDACTONE   Stopped by:  Denia Salomon PA                    Primary Care Provider Office Phone # Fax #    PAUL Smart [phone]       [address] [facility] [address]        Equal Access to Services     ESTELLA MARIE AH: Yadiel garciao Soraimro, waaxda luqadaha, qaybta kaalmada adeegyada, bridget martini. So [facility] [phone].    ATENCIÓN: Si habla español, tiene a jackson disposición servicios gratuitos de asistencia lingüística. Llame al [phone].    We comply with applicable federal civil rights laws and [place] laws. We do not discriminate on the basis of race, color, national origin, age, disability, sex, sexual orientation, or gender identity.            Thank you!     Thank you for choosing [facility]  for your care. Our goal is always to provide you with excellent care. Hearing back from our patients is one way we can continue to improve our services. Please take a few minutes to complete the written survey that you may receive in the mail after your visit with us. Thank you!           "   Your Updated Medication List - Protect others around you: Learn how to safely use, store and throw away your medicines at www.disposemymeds.org.          This list is accurate as of: 1/5/18 10:16 AM.  Always use your most recent med list.                   Brand Name Dispense Instructions for use Diagnosis    ACETAMINOPHEN PO           rizatriptan 10 MG ODT tab    MAXALT-MLT    18 tablet    DISSOLVE 1 TABLET ON TONGUE AT ONSET OF HEADACHE. MAY REPEAT IN 2 HOURS. MAX 3 TABLETS PER DAY    Migraine without aura and without status migrainosus, not intractable

## 2018-01-05 NOTE — PROGRESS NOTES
SUBJECTIVE:     CC: Rupal Puri is an 57 year old woman who presents for preventive health visit.     Healthy Habits:      Amount of exercise or daily activities, outside of work: PT weekly    Problems taking medications regularly not applicable    Medication side effects: No    Have you had an eye exam in the past two years? yes    Do you see a dentist twice per year? yes    Hibclense 2x  A week for bacterial infections on bdoy  Spironolacton elast year per derm.       SOB occ with breads  1-2x a year  Inhaler helps      Migraine hx  No aura  + sound sensitivity  Every 2 months    Hx of multiple joint pain  January 2017 - pain in LLQ  2 weeks later pulled hamstring  MRI a few months ago - Dr. 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+  PT at [facility] with Lakhwinder         Today's PHQ-2 Score:   PHQ-2 ( 1999 [employer]) 1/5/2018 7/10/2014   Q1: Little interest or pleasure in doing things 0 0   Q2: Feeling down, depressed or hopeless 0 0   PHQ-2 Score 0 0     Seeing therapist - once a month    Do you feel safe in your environment -  No      Social History   Substance Use Topics     Smoking status: Former Smoker     Packs/day: 1.00     Years: 5.00     Quit date: 3/20/1988     Smokeless tobacco: Never Used     Alcohol use 1.2 oz/week     2 Standard drinks or equivalent per week       The patient does not drink >3 drinks per day nor >7 drinks per week.    Recent Labs   Lab Test  05/17/16   1011 12/20/10   CHOL  184  179   HDL  38*  34*   LDL  112*  102   TRIG  168*  214*   CHOLHDLRATIO   --   5.3*   NHDL  146*   --          Reviewed orders with patient.  Reviewed health maintenance and updated orders accordingly - Yes    Labs reviewed in EPIC  BP Readings from Last 3 Encounters:   01/05/18 112/76   08/18/17 102/70   07/03/17 120/80    Wt  Readings from Last 3 Encounters:   01/05/18 125.1 kg (275 lb 12.8 oz)   08/18/17 120.8 kg (266 lb 6.4 oz)   07/03/17 125.2 kg (276 lb)                  Patient Active Problem List   Diagnosis     Mixed hyperlipidemia     Metabolic syndrome     Cough variant asthma     Health Care Home     Arthritis of knee     Knee pain     Cellulitis     History of GI bleed 2014     ACP (advance care planning)     Shoulder pain     Chronic pain of right knee     Internal hemorrhoids - per 2017 colonoscopy     Benign neoplasm of cecum 2017 colonoscopy - repeat 2022     Pain of both hip joints     Migraine without aura and without status migrainosus, not intractable     Past Surgical History:   Procedure Laterality Date     ARTHROPLASTY KNEE  4/23/2013    Procedure: ARTHROPLASTY KNEE;  RIGHT TOTAL KNEE ARTHROPLASTY ([email or web address])^;  Surgeon: Simon Pitts MD;  Location:  OR     ARTHROPLASTY KNEE  7/17/2014    Procedure: ARTHROPLASTY KNEE;  Surgeon: Simon Pitts MD;  Location:  OR     AS REPAIR FEMORAL HERNIA,REDUCIBLE  1991    hernia repair femoral     ESOPHAGOSCOPY, GASTROSCOPY, DUODENOSCOPY (EGD), COMBINED  7/22/2014    Procedure: COMBINED ESOPHAGOSCOPY, GASTROSCOPY, DUODENOSCOPY (EGD), BIOPSY SINGLE OR MULTIPLE;  Surgeon: Natalie Gibbons MD;  Location:  GI     TONSILLECTOMY & ADENOIDECTOMY  1966    age 6       Social History   Substance Use Topics     Smoking status: Former Smoker     Packs/day: 1.00     Years: 5.00     Quit date: 3/20/1988     Smokeless tobacco: Never Used     Alcohol use 1.2 oz/week     2 Standard drinks or equivalent per week     Family History   Problem Relation Age of Onset     OSTEOPOROSIS Mother      Ovarian Cancer Mother 35     Hypertension Mother      Leukemia Mother      CANCER Father      kidney     C.A.D. Father      early disease     Gout Father      Macular Degeneration Brother      Depression Daughter      Anxiety Disorder Daughter      Neurologic Disorder Maternal Grandmother       TIA's     DIABETES Maternal Grandmother      Lipids Brother      Cancer - colorectal No family hx of      Breast Cancer No family hx of      Anesthesia Reaction No family hx of      Crohn Disease No family hx of      Ulcerative Colitis No family hx of      Colon Polyps No family hx of          Current Outpatient Prescriptions   Medication Sig Dispense Refill     rizatriptan (MAXALT-MLT) 10 MG ODT tab DISSOLVE 1 TABLET ON TONGUE AT ONSET OF HEADACHE. MAY REPEAT IN 2 HOURS. MAX 3 TABLETS PER DAY 18 tablet 3     ACETAMINOPHEN PO        Allergies   Allergen Reactions     Ibuprofen GI Disturbance     2 days of gi upset after taking any NSAID               Mammo Decision Support:  Patient over age 50, mutual decision to screen reflected in health maintenance.    Pertinent mammograms are reviewed under the imaging tab.    History of abnormal Pap smear: NO - age 30- 65 PAP every 3 years recommended    All Histories reviewed and updated in Epic.  Past Medical History:   Diagnosis Date     Abnormal cardiovascular stress test      Chest pain      Contact dermatitis and other eczema, due to unspecified cause      Migraine, unspecified, without mention of intractable migraine without mention of status migrainosus      Mild intermittent asthma      Plantar fascial fibromatosis       Past Surgical History:   Procedure Laterality Date     ARTHROPLASTY KNEE  4/23/2013    Procedure: ARTHROPLASTY KNEE;  RIGHT TOTAL KNEE ARTHROPLASTY ([employer])^;  Surgeon: Simon Pitts MD;  Location:  OR     ARTHROPLASTY KNEE  7/17/2014    Procedure: ARTHROPLASTY KNEE;  Surgeon: Simon Pitts MD;  Location:  OR     AS REPAIR FEMORAL HERNIA,REDUCIBLE  1991    hernia repair femoral     ESOPHAGOSCOPY, GASTROSCOPY, DUODENOSCOPY (EGD), COMBINED  7/22/2014    Procedure: COMBINED ESOPHAGOSCOPY, GASTROSCOPY, DUODENOSCOPY (EGD), BIOPSY SINGLE OR MULTIPLE;  Surgeon: Natalie Gibbons MD;  Location:  GI     TONSILLECTOMY & ADENOIDECTOMY  1966     age 6       ROS:  C: NEGATIVE for fever, chills, change in weight  I: NEGATIVE for worrisome rashes, moles or lesions  E: NEGATIVE for vision changes or irritation  ENT: NEGATIVE for ear, mouth and throat problems  R: NEGATIVE for significant cough or SOB  CV: NEGATIVE for chest pain, palpitations or peripheral edema  GI: NEGATIVE for nausea, abdominal pain, heartburn, or change in bowel habits  : NEGATIVE for unusual urinary or vaginal symptoms. Periods are absent.   N: NEGATIVE for weakness, dizziness or paresthesias  P: NEGATIVE for changes in mood or affect    Problem list, Medication list, Allergies, and Medical/Social/Surgical histories reviewed in [facility] and updated as appropriate.    Labs reviewed in EPIC  BP Readings from Last 3 Encounters:   01/05/18 112/76   08/18/17 102/70   07/03/17 120/80    Wt Readings from Last 3 Encounters:   01/05/18 125.1 kg (275 lb 12.8 oz)   08/18/17 120.8 kg (266 lb 6.4 oz)   07/03/17 125.2 kg (276 lb)                  Patient Active Problem List   Diagnosis     Mixed hyperlipidemia     Metabolic syndrome     Cough variant asthma     Health Care Home     Arthritis of knee     Knee pain     Cellulitis     History of GI bleed 2014     ACP (advance care planning)     Shoulder pain     Chronic pain of right knee     Internal hemorrhoids - per 2017 colonoscopy     Benign neoplasm of cecum 2017 colonoscopy - repeat 2022     Pain of both hip joints     Migraine without aura and without status migrainosus, not intractable     Past Surgical History:   Procedure Laterality Date     ARTHROPLASTY KNEE  4/23/2013    Procedure: ARTHROPLASTY KNEE;  RIGHT TOTAL KNEE ARTHROPLASTY ([employer])^;  Surgeon: Simon Pitts MD;  Location:  OR     ARTHROPLASTY KNEE  7/17/2014    Procedure: ARTHROPLASTY KNEE;  Surgeon: Simon Pitts MD;  Location:  OR     AS REPAIR FEMORAL HERNIA,REDUCIBLE  1991    hernia repair femoral     ESOPHAGOSCOPY, GASTROSCOPY, DUODENOSCOPY (EGD), COMBINED  7/22/2014     Procedure: COMBINED ESOPHAGOSCOPY, GASTROSCOPY, DUODENOSCOPY (EGD), BIOPSY SINGLE OR MULTIPLE;  Surgeon: Natalie Gibbons MD;  Location:  GI     TONSILLECTOMY & ADENOIDECTOMY  1966    age 6       Social History   Substance Use Topics     Smoking status: Former Smoker     Packs/day: 1.00     Years: 5.00     Quit date: 3/20/1988     Smokeless tobacco: Never Used     Alcohol use 1.2 oz/week     2 Standard drinks or equivalent per week     Family History   Problem Relation Age of Onset     OSTEOPOROSIS Mother      Ovarian Cancer Mother 35     Hypertension Mother      Leukemia Mother      CANCER Father      kidney     C.A.D. Father      early disease     Gout Father      Macular Degeneration Brother      Depression Daughter      Anxiety Disorder Daughter      Neurologic Disorder Maternal Grandmother      TIA's     DIABETES Maternal Grandmother      Lipids Brother      Cancer - colorectal No family hx of      Breast Cancer No family hx of      Anesthesia Reaction No family hx of      Crohn Disease No family hx of      Ulcerative Colitis No family hx of      Colon Polyps No family hx of          Current Outpatient Prescriptions   Medication Sig Dispense Refill     rizatriptan (MAXALT-MLT) 10 MG ODT tab DISSOLVE 1 TABLET ON TONGUE AT ONSET OF HEADACHE. MAY REPEAT IN 2 HOURS. MAX 3 TABLETS PER DAY 18 tablet 3     ACETAMINOPHEN PO        Allergies   Allergen Reactions     Ibuprofen GI Disturbance     2 days of gi upset after taking any NSAID     Recent Labs   Lab Test  05/17/16   1011  04/30/16   1030  01/14/16   1926  07/22/14   0640   04/17/13   1307 12/20/10   A1C   --    --    --    --    --   5.9   --    LDL  112*   --    --    --    --    --   102   HDL  38*   --    --    --    --    --   34*   TRIG  168*   --    --    --    --    --   214*   ALT  29  46   --    --    --    --   27   CR   --   0.68  0.84  0.92   < >   --   0.78   GFRESTIMATED   --   89  79  64   < >   --   >60   GFRESTBLACK   --   >90    "American GFR Calc     --   77   < >   --    --    POTASSIUM   --   4.5  4.5  3.6   < >   --   4.6   TSH  1.28   --    --    --    --    --    --     < > = values in this interval not displayed.        OBJECTIVE:     /76 (BP Location: Right arm, Patient Position: Chair, Cuff Size: Adult Large)  Pulse 71  Temp 98.1  F (36.7  C) (Oral)  Ht 1.67 m (5' 5.75\")  Wt 125.1 kg (275 lb 12.8 oz)  LMP 03/10/2013  SpO2 98%  Breastfeeding? No  BMI 44.85 kg/m2  EXAM:  GENERAL: healthy, alert and no distress  EYES: Eyes grossly normal to inspection, PERRL and conjunctivae and sclerae normal  HENT: ear canals and TM's normal, nose and mouth without ulcers or lesions  NECK: no adenopathy, no asymmetry, masses, or scars and thyroid normal to palpation  RESP: lungs clear to auscultation - no rales, rhonchi or wheezes  CV: regular rate and rhythm, normal S1 S2, no S3 or S4, no murmur, click or rub, no peripheral edema   ABDOMEN: soft, nontender, no hepatosplenomegaly, no masses and bowel sounds normal  MS: no gross musculoskeletal defects noted, no edema - right medial clavicular enlargement stable   SKIN: no suspicious lesions or rashes  NEURO: Normal strength and tone, mentation intact and speech normal  PSYCH: mentation appears normal, affect normal/bright    ASSESSMENT/PLAN:     1. Mixed hyperlipidemia      2. Abnormal glucose  resolved    3. Cough variant asthma  Albuterol with colds  Will call for albuterol refills    4. Routine history and physical examination of adult    - VENOUS COLLECTION  - Lipid Profile  - Glucose Fasting (BFP)    5. Migraine without aura and without status migrainosus, not intractable  Maxalt ok for refills    6. Morbid obesity (H)  RTC 3 months  Pt will do myfitness pal  Declines obesity clinic referral      COUNSELING:     Special attention given to:        Regular exercise       Healthy diet/nutrition       Vision screening       Hearing screening       Immunizations    UTD        Blood " "Pressure.  BP Readings from Last 6 Encounters:   01/05/18 112/76   08/18/17 102/70   07/03/17 120/80   06/23/17 110/60   03/01/17 132/80   11/30/16 122/76              reports that she quit smoking about 29 years ago. She has a 5.00 pack-year smoking history. She has never used smokeless tobacco.    Estimated body mass index is 44.85 kg/(m^2) as calculated from the following:    Height as of this encounter: 1.67 m (5' 5.75\").    Weight as of this encounter: 125.1 kg (275 lb 12.8 oz).   Weight management plan: Discussed healthy diet and exercise guidelines and patient will follow up in 3 months in clinic to re-evaluate.    Counseling Resources:  ATP IV Guidelines  Pooled Cohorts Equation Calculator  Breast Cancer Risk Calculator  FRAX Risk Assessment  ICSI Preventive Guidelines  Dietary Guidelines for Americans, 2010  USDA's MyPlate  ASA Prophylaxis  Lung CA Screening    PAUL Smart  [facility]    "

## 2018-01-05 NOTE — NURSING NOTE
Rupal is here for a CPX, pt is fasting    Patient is here for a full physical exam.    Pre-Visit Screening :  Immunizations : up to date  Colon Screening : is up to date  Mammogram: UPT  Asthma Action Test/Plan : na  PHQ9/GAD7 :  phq2      Vitals:  Pulse - regular  My Chart - accepts    CLASSIFICATION OF OVERWEIGHT AND OBESITY BY BMI                         Obesity Class           BMI(kg/m2)  Underweight                                    < 18.5  Normal                                         18.5-24.9  Overweight                                     25.0-29.9  OBESITY                     I                  30.0-34.9                              II                 35.0-39.9  EXTREME OBESITY             III                >40                             Patient's  BMI There is no height or weight on file to calculate BMI.  http://hin.nhlbi.nih.gov/menuplanner/menu.cgi  Questioned patient about current smoking habits.  Pt. has never smoked.    ETOH screening:  Questions:  1-How often do you have a drink containing alcohol?                             2 times per week(s)  2-How many drinks containing alcohol do you have on a typical day when you are         Drinking?                              1   3- How often do you have 5 or more drinks on one occasion?                              0 per year    Have you ever:  None of the patient's responses to the CAGE screening were positive / Negative CAGE score       The patient has verbalized that it is ok to leave a detailed voice message on the patient's cell phone with results/recommendations from this visit.       Verified [phone] phone number:

## 2018-01-06 LAB
CHOLEST SERPL-MCNC: 204 MG/DL
CHOLEST/HDLC SERPL: 5.8 (CALC)
HDLC SERPL-MCNC: 35 MG/DL
LDLC SERPL CALC-MCNC: 133 MG/DL (CALC)
NONHDLC SERPL-MCNC: 169 MG/DL (CALC)
TRIGL SERPL-MCNC: 217 MG/DL

## 2018-01-22 ENCOUNTER — OFFICE VISIT (OUTPATIENT)
Dept: FAMILY MEDICINE | Facility: CLINIC | Age: 58
End: 2018-01-22

## 2018-01-22 VITALS
RESPIRATION RATE: 12 BRPM | HEIGHT: 65 IN | OXYGEN SATURATION: 98 % | WEIGHT: 275.6 LBS | DIASTOLIC BLOOD PRESSURE: 70 MMHG | HEART RATE: 93 BPM | TEMPERATURE: 99.7 F | BODY MASS INDEX: 45.92 KG/M2 | SYSTOLIC BLOOD PRESSURE: 132 MMHG

## 2018-01-22 DIAGNOSIS — J20.9 ACUTE BRONCHITIS, UNSPECIFIED ORGANISM: Primary | ICD-10-CM

## 2018-01-22 DIAGNOSIS — J06.9 VIRAL UPPER RESPIRATORY TRACT INFECTION: ICD-10-CM

## 2018-01-22 PROCEDURE — 99213 OFFICE O/P EST LOW 20 MIN: CPT | Performed by: FAMILY MEDICINE

## 2018-01-22 RX ORDER — BENZONATATE 100 MG/1
100 CAPSULE ORAL 3 TIMES DAILY PRN
Qty: 21 CAPSULE | Refills: 0 | Status: SHIPPED | OUTPATIENT
Start: 2018-01-22 | End: 2018-01-30

## 2018-01-22 RX ORDER — GUAIFENESIN 600 MG/1
600 TABLET, EXTENDED RELEASE ORAL 2 TIMES DAILY PRN
Qty: 20 TABLET | Refills: 0 | Status: SHIPPED | OUTPATIENT
Start: 2018-01-22 | End: 2018-09-07

## 2018-01-22 NOTE — NURSING NOTE
Rupal is here for URI    Pre-visit Screening:  Immunizations:  up to date  Colonoscopy:  is up to date  Mammogram: is up to date  Asthma Action Test/Plan:  MEHDI  PHQ9:  NA  GAD7:  NA  Questioned patient about current smoking habits Pt. has never smoked.  Ok to leave detailed message on voice mail for today's visit only Yes, phone # [phone]

## 2018-01-22 NOTE — MR AVS SNAPSHOT
After Visit Summary   1/22/2018    Rupal Puri    MRN: [id number]           Patient Information     Date Of Birth          1960        Visit Information        Provider Department      1/22/2018 10:15 AM Donna Alvarez MD [facility]        Today's Diagnoses     Acute bronchitis, unspecified organism    -  1    Viral upper respiratory tract infection          Care Instructions    Acute bronchitis, unspecified organism  (primary encounter diagnosis)  Plan: guaiFENesin (MUCINEX) 600 MG 12 hr tablet,         benzonatate (TESSALON) 100 MG capsule        Symptomatic care with decongestants, fluids, tylenol/advil prn. Use GUAIFENESIN  MG OR TBCR, 1 tab po BID (Twice per day), D: 20, R: 0 for congestion and cough.    In addition, I have suggested that the patient   monitor for symptoms of bacterial infection expecting slow gradual resolution of viral URI as the natural course.            Follow-ups after your visit        Follow-up notes from your care team     Return if symptoms worsen or fail to improve.      Who to contact     If you have questions or need follow up information about today's clinic visit or your schedule please contact [facility] directly at [phone].  Normal or non-critical lab and imaging results will be communicated to you by MyChart, letter or phone within 4 business days after the clinic has received the results. If you do not hear from us within 7 days, please contact the clinic through MyChart or phone. If you have a critical or abnormal lab result, we will notify you by phone as soon as possible.  Submit refill requests through [employer] or call your pharmacy and they will forward the refill request to us. Please allow 3 business days for your refill to be completed.          Additional Information About Your Visit        Wiketshart Information     [employer] gives you secure access to your electronic health record. If  "you see a primary care provider, you can also send messages to your care team and make appointments. If you have questions, please call your primary care clinic.  If you do not have a primary care provider, please call [phone] and they will assist you.        Care EveryWhere ID     This is your Care EveryWhere ID. This could be used by other organizations to access your [place] medical records  [id number]        Your Vitals Were     Pulse Temperature Respirations Height Last Period Pulse Oximetry    93 99.7  F (37.6  C) (Oral) 12 1.657 m (5' 5.25\") 03/10/2013 98%    BMI (Body Mass Index)                   45.51 kg/m2            Blood Pressure from Last 3 Encounters:   01/22/18 132/70   01/05/18 112/76   08/18/17 102/70    Weight from Last 3 Encounters:   01/22/18 125 kg (275 lb 9.6 oz)   01/05/18 125.1 kg (275 lb 12.8 oz)   08/18/17 120.8 kg (266 lb 6.4 oz)              Today, you had the following     No orders found for display         Today's Medication Changes          These changes are accurate as of: 1/22/18 12:12 PM.  If you have any questions, ask your nurse or doctor.               Start taking these medicines.        Dose/Directions    benzonatate 100 MG capsule   Commonly known as:  TESSALON   Used for:  Acute bronchitis, unspecified organism   Started by:  Donna Alvarez MD        Dose:  100 mg   Take 1 capsule (100 mg) by mouth 3 times daily as needed   Quantity:  21 capsule   Refills:  0       guaiFENesin 600 MG 12 hr tablet   Commonly known as:  MUCINEX   Used for:  Acute bronchitis, unspecified organism   Started by:  Donna Alvarez MD        Dose:  600 mg   Take 1 tablet (600 mg) by mouth 2 times daily as needed for congestion   Quantity:  20 tablet   Refills:  0            Where to get your medicines      These medications were sent to [facility]/pharmacy #[id number] [place], [place] - [address], [place] [address]     Phone:  [phone]     benzonatate 100 MG " capsule    guaiFENesin 600 MG 12 hr tablet                Primary Care Provider Office Phone # Fax #    PAUL Smart [phone] [address]  [facility] [address]        Equal Access to Services     ESTELLA MARIE : Hadii aad ku hadchristinao Soomaali, waaxda luqadaha, qaybta kaalmada adeegyada, waxay idiin hayaan adeeg khamando lajose eduardo . So [facility] [phone].    ATENCIÓN: Si habla español, tiene a jackson disposición servicios gratuitos de asistencia lingüística. Llame al [phone].    We comply with applicable federal civil rights laws and [place] laws. We do not discriminate on the basis of race, color, national origin, age, disability, sex, sexual orientation, or gender identity.            Thank you!     Thank you for choosing [facility]  for your care. Our goal is always to provide you with excellent care. Hearing back from our patients is one way we can continue to improve our services. Please take a few minutes to complete the written survey that you may receive in the mail after your visit with us. Thank you!             Your Updated Medication List - Protect others around you: Learn how to safely use, store and throw away your medicines at www.disposemymeds.org.          This list is accurate as of: 1/22/18 12:12 PM.  Always use your most recent med list.                   Brand Name Dispense Instructions for use Diagnosis    ACETAMINOPHEN PO           benzonatate 100 MG capsule    TESSALON    21 capsule    Take 1 capsule (100 mg) by mouth 3 times daily as needed    Acute bronchitis, unspecified organism       guaiFENesin 600 MG 12 hr tablet    MUCINEX    20 tablet    Take 1 tablet (600 mg) by mouth 2 times daily as needed for congestion    Acute bronchitis, unspecified organism       rizatriptan 10 MG ODT tab    MAXALT-MLT    18 tablet    DISSOLVE 1 TABLET ON TONGUE AT ONSET OF HEADACHE. MAY REPEAT IN 2 HOURS. MAX 3 TABLETS PER DAY    Migraine  without aura and without status migrainosus, not intractable

## 2018-01-22 NOTE — PATIENT INSTRUCTIONS
Acute bronchitis, unspecified organism  (primary encounter diagnosis)  Plan: guaiFENesin (MUCINEX) 600 MG 12 hr tablet,         benzonatate (TESSALON) 100 MG capsule        Symptomatic care with decongestants, fluids, tylenol/advil prn. Use GUAIFENESIN  MG OR TBCR, 1 tab po BID (Twice per day), D: 20, R: 0 for congestion and cough.    In addition, I have suggested that the patient   monitor for symptoms of bacterial infection expecting slow gradual resolution of viral URI as the natural course.

## 2018-01-22 NOTE — PROGRESS NOTES
SUBJECTIVE: 57 year old female complaining of nasal congestion , cough and burning lungs for 1 day(s).   The patient describes irritating cough. Fatigue  The patient denies a history of fever, GI symptoms or pain.   Smoking history: No.   Relevant past medical history: asthma cough variant    OBJECTIVE: The patient appears healthy, alert, no distress, cooperative, smiling and over weight.   EARS: negative  NOSE/SINUS: positive findings: mucosa erythematous and swollen, clear rhinorrhea   THROAT: normal and post nasal drainage   NECK:Neck supple. No adenopathy. Thyroid symmetric, normal size,, Carotids without bruits.   CHEST: Clear with dry cough    ASSESSMENT: (J20.9) Acute bronchitis, unspecified organism  (primary encounter diagnosis)  Plan: guaiFENesin (MUCINEX) 600 MG 12 hr tablet,         benzonatate (TESSALON) 100 MG capsule        Symptomatic care with decongestants, fluids, tylenol/advil prn. Use GUAIFENESIN  MG OR TBCR, 1 tab po BID (Twice per day), D: 20, R: 0 for congestion and cough.    In addition, I have suggested that the patient   monitor for symptoms of bacterial infection expecting slow gradual resolution of viral URI as the natural course.      (J06.9,  B97.89) Viral upper respiratory tract infection  Plan: I have reviewed the patient's medical history in detail and updated the computerized patient record.

## 2018-01-30 DIAGNOSIS — J20.9 ACUTE BRONCHITIS, UNSPECIFIED ORGANISM: ICD-10-CM

## 2018-01-30 RX ORDER — BENZONATATE 100 MG/1
100 CAPSULE ORAL 3 TIMES DAILY PRN
Qty: 12 CAPSULE | Refills: 0 | Status: SHIPPED | OUTPATIENT
Start: 2018-01-30 | End: 2018-09-07

## 2018-01-30 NOTE — TELEPHONE ENCOUNTER
I called the patient and informed her that the refill has been sent and if she is still not feeling well after she finishes those she should consider coming in to be seen for a recheck.    Jessy Matt, CMA

## 2018-01-30 NOTE — TELEPHONE ENCOUNTER
Rupal Puri called CS and LM stating that she is requesting a refill of the Benzonatate Tabs for her cough.    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    Pending Prescriptions:                       Disp   Refills    benzonatate (TESSALON) 100 MG capsule     12 cap*0            Sig: Take 1 capsule (100 mg) by mouth 3 times daily as           needed    Patient was seen on 01/22/2018 for Bronchitis.    Dr. Alvarez could you please review and advise?    Patient Call Back Info:  [phone] (home) [phone] (work)    Thank You,  Jessy Matt, NAKUL

## 2018-07-27 ENCOUNTER — TELEPHONE (OUTPATIENT)
Dept: FAMILY MEDICINE | Facility: CLINIC | Age: 58
End: 2018-07-27

## 2018-07-27 DIAGNOSIS — M10.9 ACUTE GOUTY ARTHRITIS: Primary | ICD-10-CM

## 2018-07-27 PROCEDURE — 99285 EMERGENCY DEPT VISIT HI MDM: CPT

## 2018-07-27 PROCEDURE — 93005 ELECTROCARDIOGRAM TRACING: CPT

## 2018-07-27 RX ORDER — COLCHICINE 0.6 MG/1
TABLET ORAL
Qty: 30 TABLET | Refills: 0 | Status: SHIPPED | OUTPATIENT
Start: 2018-07-27 | End: 2019-05-03

## 2018-07-27 NOTE — TELEPHONE ENCOUNTER
Rupal called stating she has gout which she has not had a flare up for 18 months.  She had a medrol pack and took 3 or 4 pills along with taking tart cherry pills.  She does feel better, however, she is traveling next week and would like advice as to what she can do to not have a flare-up while traveling.  Please advise    Patient's phone #[phone]

## 2018-07-27 NOTE — TELEPHONE ENCOUNTER
Patient would like the colchicine sent in so that she can try and take that for 30 days. Routing to Aniyah to send in prescription.

## 2018-07-27 NOTE — TELEPHONE ENCOUNTER
Please call pt back   Did she complete the dose pack?  Have her drink plenty of water, avoid any foods that she knows will cause flare.    We can start her on colchicine as well for 30 days which can help prevent recurrence:     . AE include:    . Belly pain.  .  Loose stools (diarrhea).  .  Upset stomach or throwing up.      Please offer Colchicine to her and I can send in rx for 30 days    Denia Salomon PA-C  7/27/2018

## 2018-07-28 ENCOUNTER — APPOINTMENT (OUTPATIENT)
Dept: GENERAL RADIOLOGY | Facility: CLINIC | Age: 58
End: 2018-07-28
Attending: EMERGENCY MEDICINE
Payer: COMMERCIAL

## 2018-07-28 ENCOUNTER — HOSPITAL ENCOUNTER (EMERGENCY)
Facility: CLINIC | Age: 58
Discharge: HOME | End: 2018-07-28
Attending: EMERGENCY MEDICINE | Admitting: EMERGENCY MEDICINE
Payer: COMMERCIAL

## 2018-07-28 VITALS
TEMPERATURE: 98.2 F | RESPIRATION RATE: 17 BRPM | OXYGEN SATURATION: 98 % | WEIGHT: 285 LBS | BODY MASS INDEX: 45.8 KG/M2 | SYSTOLIC BLOOD PRESSURE: 127 MMHG | HEIGHT: 66 IN | DIASTOLIC BLOOD PRESSURE: 72 MMHG | HEART RATE: 93 BPM

## 2018-07-28 DIAGNOSIS — R07.89 ATYPICAL CHEST PAIN: ICD-10-CM

## 2018-07-28 LAB
ALBUMIN SERPL-MCNC: 3.8 G/DL (ref 3.4–5)
ALP SERPL-CCNC: 61 U/L (ref 40–150)
ALT SERPL W P-5'-P-CCNC: 32 U/L (ref 0–50)
ANION GAP SERPL CALCULATED.3IONS-SCNC: 8 MMOL/L (ref 3–14)
AST SERPL W P-5'-P-CCNC: 20 U/L (ref 0–45)
BASOPHILS # BLD AUTO: 0 10E9/L (ref 0–0.2)
BASOPHILS NFR BLD AUTO: 0.3 %
BILIRUB SERPL-MCNC: 0.3 MG/DL (ref 0.2–1.3)
BUN SERPL-MCNC: 25 MG/DL (ref 7–30)
CALCIUM SERPL-MCNC: 9.3 MG/DL (ref 8.5–10.1)
CHLORIDE SERPL-SCNC: 107 MMOL/L (ref 94–109)
CO2 SERPL-SCNC: 26 MMOL/L (ref 20–32)
CREAT SERPL-MCNC: 0.73 MG/DL (ref 0.52–1.04)
DIFFERENTIAL METHOD BLD: NORMAL
EOSINOPHIL # BLD AUTO: 0.1 10E9/L (ref 0–0.7)
EOSINOPHIL NFR BLD AUTO: 0.5 %
ERYTHROCYTE [DISTWIDTH] IN BLOOD BY AUTOMATED COUNT: 12.2 % (ref 10–15)
GFR SERPL CREATININE-BSD FRML MDRD: 82 ML/MIN/1.7M2
GLUCOSE SERPL-MCNC: 139 MG/DL (ref 70–99)
HCT VFR BLD AUTO: 42.2 % (ref 35–47)
HGB BLD-MCNC: 14.2 G/DL (ref 11.7–15.7)
IMM GRANULOCYTES # BLD: 0 10E9/L (ref 0–0.4)
IMM GRANULOCYTES NFR BLD: 0.3 %
INTERPRETATION ECG - MUSE: NORMAL
LIPASE SERPL-CCNC: 247 U/L (ref 73–393)
LYMPHOCYTES # BLD AUTO: 1.9 10E9/L (ref 0.8–5.3)
LYMPHOCYTES NFR BLD AUTO: 19.3 %
MCH RBC QN AUTO: 31.3 PG (ref 26.5–33)
MCHC RBC AUTO-ENTMCNC: 33.6 G/DL (ref 31.5–36.5)
MCV RBC AUTO: 93 FL (ref 78–100)
MONOCYTES # BLD AUTO: 0.7 10E9/L (ref 0–1.3)
MONOCYTES NFR BLD AUTO: 6.6 %
NEUTROPHILS # BLD AUTO: 7.2 10E9/L (ref 1.6–8.3)
NEUTROPHILS NFR BLD AUTO: 73 %
NRBC # BLD AUTO: 0 10*3/UL
NRBC BLD AUTO-RTO: 0 /100
PLATELET # BLD AUTO: 234 10E9/L (ref 150–450)
POTASSIUM SERPL-SCNC: 3.7 MMOL/L (ref 3.4–5.3)
PROT SERPL-MCNC: 7.2 G/DL (ref 6.8–8.8)
RBC # BLD AUTO: 4.53 10E12/L (ref 3.8–5.2)
SODIUM SERPL-SCNC: 141 MMOL/L (ref 133–144)
TROPONIN I SERPL-MCNC: <0.015 UG/L (ref 0–0.04)
WBC # BLD AUTO: 9.9 10E9/L (ref 4–11)

## 2018-07-28 PROCEDURE — 85025 COMPLETE CBC W/AUTO DIFF WBC: CPT | Performed by: EMERGENCY MEDICINE

## 2018-07-28 PROCEDURE — 25000132 ZZH RX MED GY IP 250 OP 250 PS 637: Performed by: EMERGENCY MEDICINE

## 2018-07-28 PROCEDURE — 80053 COMPREHEN METABOLIC PANEL: CPT | Performed by: EMERGENCY MEDICINE

## 2018-07-28 PROCEDURE — 71046 X-RAY EXAM CHEST 2 VIEWS: CPT

## 2018-07-28 PROCEDURE — 84484 ASSAY OF TROPONIN QUANT: CPT | Performed by: EMERGENCY MEDICINE

## 2018-07-28 PROCEDURE — 25000125 ZZHC RX 250: Performed by: EMERGENCY MEDICINE

## 2018-07-28 PROCEDURE — 83690 ASSAY OF LIPASE: CPT | Performed by: EMERGENCY MEDICINE

## 2018-07-28 RX ORDER — ALUMINA, MAGNESIA, AND SIMETHICONE 2400; 2400; 240 MG/30ML; MG/30ML; MG/30ML
15 SUSPENSION ORAL ONCE
Status: COMPLETED | OUTPATIENT
Start: 2018-07-28 | End: 2018-07-28

## 2018-07-28 RX ORDER — ASPIRIN 81 MG/1
324 TABLET, CHEWABLE ORAL ONCE
Status: COMPLETED | OUTPATIENT
Start: 2018-07-28 | End: 2018-07-28

## 2018-07-28 RX ADMIN — ALUMINUM HYDROXIDE, MAGNESIUM HYDROXIDE, AND DIMETHICONE 15 ML: 400; 400; 40 SUSPENSION ORAL at 00:50

## 2018-07-28 RX ADMIN — LIDOCAINE HYDROCHLORIDE 15 ML: 20 SOLUTION ORAL; TOPICAL at 00:51

## 2018-07-28 RX ADMIN — ASPIRIN 81 MG 324 MG: 81 TABLET ORAL at 00:51

## 2018-07-28 ASSESSMENT — ENCOUNTER SYMPTOMS
SHORTNESS OF BREATH: 0
VOMITING: 0
NAUSEA: 1
MYALGIAS: 1

## 2018-07-28 NOTE — ED AVS SNAPSHOT
[facility] Emergency Department    [address]    [facility] [address]    Phone:  [phone]    Fax:  [phone]                                       Rupal Puri   MRN: [id number]    Department:  [facility] Emergency Department   Date of Visit:  7/27/2018           After Visit Summary Signature Page     I have received my discharge instructions, and my questions have been answered. I have discussed any challenges I see with this plan with the nurse or doctor.    ..........................................................................................................................................  Patient/Patient Representative Signature      ..........................................................................................................................................  Patient Representative Print Name and Relationship to Patient    ..................................................               ................................................  Date                                            Time    ..........................................................................................................................................  Reviewed by Signature/Title    ...................................................              ..............................................  Date                                                            Time

## 2018-07-28 NOTE — ED PROVIDER NOTES
History   Chief Complaint:  Chest Pain    HPI   Rupal Puri is a 58 year old female with a history of hyperlipidemia who presents with chest pain. The patient reports that at 1730 she developed nausea, pain in her left arm, left ear, and left-sided chest pain, prompting her to call the nurse line who recommended she come to the ED. She states the pain is improved in the ED and is a 1/10 currently. The patient did have a stress test and CTA obtained two years ago which were unremarkable. She denies vomiting and shortness of breath.     CARDIAC RISK FACTORS:  Sex:    Female  Tobacco:   Former smoker  Hypertension:   No  Hyperlipidemia:  Yes  Diabetes:   No  Family History:  Yes    PE/DVT RISK FACTORS:  Sex:    Female  Hormones:   No  Tobacco:   Former smoker  Cancer:   No  Travel:   No  Surgery:   No  Other immobilization: No  Personal history:  No  Family history:  No    CTA Coronary Artery- 05/17/2016:  1.  Patent coronary arteries with minimal luminal irregularities.  2.  Total Agatston score 0 placing the patient in the lowest  percentile when compared to age and gender matched control group.  3.  Please review Radiology report for incidental noncardiac findings  that will follow separately.    NM MPI Lexiscan- 05/12/2016:  1. Myocardial perfusion imaging using single isotope technique  demonstrated inferior and inferoseptal myocardial ischemia.  Approximately 10% of the myocardium appears reversibly ischemic.  2. Gated images demonstrated normal LV systolic contraction.   The left ventricular  EF 54%.  3.No previous study was available.    Allergies:  Ibuprofen    Medications:    Rizatriptan  Tessalon  Colchicine    Past Medical History:    Migraine  Mild intermittent asthma  Plantar fasciitis fibromatosis  Hyperlipidemia  Metabolic syndrome  Arthritis of knee   Chronic pain of right knee  GI bleed  Cellulitis  Benign neoplasm of cecum   Internal hemorrhoids    Past Surgical History:    Arthroplasty knee  "x2  Femoral hernia repair, reducible   Tonsillectomy and adenoidectomy    Family History:    Osteoporosis  Ovarian cancer  Hypertension  Leukemia  Kidney cancer  CAD  Gout  Macular degeneration  Depression  Anxiety disorder  Hyperlipidemia     Social History:  Smoking status: Former smoker  Alcohol use: Yes  Marital Status:   [5]    Review of Systems   HENT: Positive for ear pain (Left ear).    Respiratory: Negative for shortness of breath.    Cardiovascular: Positive for chest pain.   Gastrointestinal: Positive for nausea. Negative for vomiting.   Musculoskeletal: Positive for myalgias (Left arm).   All other systems reviewed and are negative.    Physical Exam   Patient Vitals for the past 24 hrs:   BP Temp Temp src Pulse Heart Rate Resp SpO2 Height Weight   07/28/18 0121 - - - - 80 17 - - -   07/28/18 0045 127/72 - - - 81 - - - -   07/28/18 0030 121/72 - - - 81 18 - - -   07/27/18 2348 (!) 171/100 - - - - - - - -   07/27/18 2346 - 98.2  F (36.8  C) Temporal 93 - 20 98 % 1.676 m (5' 6\") 129.3 kg (285 lb)     Physical Exam  Constitutional:  Oriented to person, place, and time. Well-appearing.  HENT:   Head:    Normocephalic.   Mouth/Throat:   Oropharynx is clear and moist.   Eyes:    EOM are normal. Pupils are equal, round, and reactive to light.   Neck:    Neck supple.   Cardiovascular:  Normal rate, regular rhythm and normal heart sounds.      Exam reveals no gallop and no friction rub.       No murmur heard.  Pulmonary/Chest:  Effort normal and breath sounds normal.      No respiratory distress. No wheezes. No rales.      No reproducible chest wall pain.  Abdominal:   Soft. No distension. No tenderness. No rebound and no guarding.   Musculoskeletal:  2+ distal equal pulses. No leg calf tenderness, swelling or edema.Normal range of motion.   Neurological:   Alert and oriented to person, place, and time.           Moves all 4 extremities spontaneously    Skin:    No rash noted. No pallor.     Emergency " Department Course   ECG (23:55:03):  Rate 79 bpm. AK interval 152. QRS duration 128. QT/QTc 418/479. P-R-T axes 81 53 23. Normal sinus rhythm. Left bundle branch block. Abnormal ECG. No significant change compared to EKG dated 05/02/2016. Interpreted at 0025 by Pa Artis MD.    Imaging:  Radiographic findings were communicated with the patient who voiced understanding of the findings.    XR Chest 2 Views:  No evidence of active cardiopulmonary disease.  As read by Radiology.     Laboratory:  CBC: WNL (WBC 9.9, HGB 14.2, )  CMP:  (H) o/w WNL (Creatinine 0.73)  Troponin: <0.015  Lipase: 247    Interventions:  0050: GI Cocktail - Maalox 15 mL, Viscous Lidocaine 15 mL, 30 mL suspension PO  0051: Aspirin 324 mg PO  0051: Xylocaine 2 5 solution 15 mL's Mouth/Throat    Emergency Department Course:  Past medical records, nursing notes, and vitals reviewed.  0032: I performed an exam of the patient and obtained history, as documented above.  EKG obtained, results above.  IV inserted and blood drawn.    0152: I rechecked the patient. Explained findings to patient.    Findings and plan explained to the patient. Patient discharged home with instructions regarding supportive care, medications, and reasons to return. The importance of close follow-up was reviewed.     Impression & Plan    Medical Decision Making:  Rupal Puri is a 58 year old female that came in complaining of chest pain starting greater than six hours prior to arrival. Differential includes ACS, pneumothorax, costochondritis, aortic dissection, and other causes. She did end up having workup as seen. Workup shows an unchanged left bundle branch block from a previous, a negative troponin, and lab work was otherwise reassuring. Pain is minimal to nonexistent at this time, non-pleuritic. I would highly doubt PE and there is no need for workup for this. Other than her left bundle branch block which is uninterpretable, she has low heart  score. I do not think she had MI with a six hour rule-out. I cannot definitively rule out angina and did offer observation admission, at which point was ultimately refused, as she was pain free. Rather follow up with her primary care doctor. I do believe this is an appropriate plan. I did discuss with her that if she should have any further chest pain or shortness of breath that she should return to the ED. Discharged home, follow up with primary care doctor.    Diagnosis:    ICD-10-CM   1. Atypical chest pain R07.89       Disposition:  Discharged to home.      Manpreet Ramirez  7/27/2018   [facility] EMERGENCY DEPARTMENT  I, Manpreet Ramirez, am serving as a scribe at 12:32 AM on 7/28/2018 to document services personally performed by Pa Artis MD based on my observations and the provider's statements to me.        Pa Artis MD  07/28/18 0412

## 2018-07-28 NOTE — ED AVS SNAPSHOT
[facility] Emergency Department    [address]    [facility] [address]    Phone:  [phone]    Fax:  [phone]                                       Rupal Puri   MRN: [id number]    Department:  [facility] Emergency Department   Date of Visit:  7/27/2018           Patient Information     Date Of Birth          1960        Your diagnoses for this visit were:     Atypical chest pain        You were seen by Pa Artis MD.      Follow-up Information     Follow up with Denia Salomon PA. Call in 3 days.    Specialty:  Family Practice    Contact information:    [address] [facility] [address]  [phone]          Discharge Instructions       Discharge Instructions  Chest Pain    You have been seen today for chest pain or discomfort.  At this time, your doctor has found no signs that your chest pain is due to a serious or life-threatening condition, (or you have declined more testing and/or admission to the hospital). However, sometimes there is a serious problem that does not show up right away. Your evaluation today may not be complete and you may need further testing and evaluation.     You need to follow-up with your regular doctor within 3 days.    Return to the Emergency Department if:    Your chest pain changes, gets worse, starts to happen more often, or comes with less activity.    You are short of breath.    You get very weak or tired.    You pass out or faint.    You have any new symptoms, like fever, cough, numb legs, or you cough up blood.    You have anything else that worries you.    Until you follow-up with your regular doctor please do the following:    Take one aspirin daily unless you have an allergy or are told not to by your doctor.    If a stress test appointment has been made, go to the appointment.    If you have questions, contact your regular doctor.    If your doctor today has told you to follow-up with your  regular doctor, it is very important that you make an appointment with your clinic and go to the appointment.  If you do not follow-up with your primary doctor, it may result in missing an important development which could result in permanent injury or disability and/or lasting pain.  If there is any problem keeping your appointment, call your doctor or return to the Emergency Department.    If you were given a prescription for medicine here today, be sure to read all of the information (including the package insert) that comes with your prescription.  This will include important information about the medicine, its side effects, and any warnings that you need to know about.  The pharmacist who fills the prescription can provide more information and answer questions you may have about the medicine.  If you have questions or concerns that the pharmacist cannot address, please call or return to the Emergency Department.     Opioid Medication Information    Pain medications are among the most commonly prescribed medicines, so we are including this information for all our patients. If you did not receive pain medication or get a prescription for pain medicine, you can ignore it.     You may have been given a prescription for an opioid (narcotic) pain medicine and/or have received a pain medicine while here in the Emergency Department. These medicines can make you drowsy or impaired. You must not drive, operate dangerous equipment, or engage in any other dangerous activities while taking these medications. If you drive while taking these medications, you could be arrested for DUI, or driving under the influence. Do not drink any alcohol while you are taking these medications.     Opioid pain medications can cause addiction. If you have a history of chemical dependency of any type, you are at a higher risk of becoming addicted to pain medications.  Only take these prescribed medications to treat your pain when all other  options have been tried. Take it for as short a time and as few doses as possible. Store your pain pills in a secure place, as they are frequently stolen and provide a dangerous opportunity for children or visitors in your house to start abusing these powerful medications. We will not replace any lost or stolen medicine.  As soon as your pain is better, you should flush all your remaining medication.     Many prescription pain medications contain Tylenol  (acetaminophen), including Vicodin , Tylenol #3 , Norco , Lortab , and Percocet .  You should not take any extra pills of Tylenol  if you are using these prescription medications or you can get very sick.  Do not ever take more than 3000 mg of acetaminophen in any 24 hour period.    All opioids tend to cause constipation. Drink plenty of water and eat foods that have a lot of fiber, such as fruits, vegetables, prune juice, apple juice and high fiber cereal.  Take a laxative if you don t move your bowels at least every other day. Miralax , Milk of Magnesia, Colace , or Senna  can be used to keep you regular.      Remember that you can always come back to the Emergency Department if you are not able to see your regular doctor in the amount of time listed above, if you get any new symptoms, or if there is anything that worries you.          24 Hour Appointment Hotline       To make an appointment at any [facility], call [phone] ([phone]). If you don't have a family doctor or clinic, we will help you find one. [facility] clinics are conveniently located to serve the needs of you and your family.             Review of your medicines      Our records show that you are taking the medicines listed below. If these are incorrect, please call your family doctor or clinic.        Dose / Directions Last dose taken    ACETAMINOPHEN PO        Refills:  0        benzonatate 100 MG capsule   Commonly known as:  TESSALON   Dose:  100 mg   Quantity:  12 capsule         Take 1 capsule (100 mg) by mouth 3 times daily as needed   Refills:  0        colchicine 0.6 MG tablet   Commonly known as:  COLCRYS   Quantity:  30 tablet        Take 1 tablet daily   Refills:  0        guaiFENesin 600 MG 12 hr tablet   Commonly known as:  MUCINEX   Dose:  600 mg   Quantity:  20 tablet        Take 1 tablet (600 mg) by mouth 2 times daily as needed for congestion   Refills:  0        rizatriptan 10 MG ODT tab   Commonly known as:  MAXALT-MLT   Quantity:  18 tablet        DISSOLVE 1 TABLET ON TONGUE AT ONSET OF HEADACHE. MAY REPEAT IN 2 HOURS. MAX 3 TABLETS PER DAY   Refills:  3                Procedures and tests performed during your visit     CBC with platelets differential    Cardiac Continuous Monitoring    Comprehensive metabolic panel    EKG 12 lead    Lipase    Peripheral IV: Standard    Pulse oximetry nursing    Troponin I    XR Chest 2 Views      Orders Needing Specimen Collection     None      Pending Results     Date and Time Order Name Status Description    7/28/2018 0036 XR Chest 2 Views Preliminary             Pending Culture Results     No orders found from 7/26/2018 to 7/29/2018.            Pending Results Instructions     If you had any lab results that were not finalized at the time of your Discharge, you can call the ED Lab Result RN at [phone]. You will be contacted by this team for any positive Lab results or changes in treatment. The nurses are available 7 days a week from 10A to 6:30P.  You can leave a message 24 hours per day and they will return your call.        Test Results From Your Hospital Stay        7/28/2018  1:01 AM      Component Results     Component Value Ref Range & Units Status    WBC 9.9 4.0 - 11.0 10e9/L Final    RBC Count 4.53 3.8 - 5.2 10e12/L Final    Hemoglobin 14.2 11.7 - 15.7 g/dL Final    Hematocrit 42.2 35.0 - 47.0 % Final    MCV 93 78 - 100 fl Final    MCH 31.3 26.5 - 33.0 pg Final    MCHC 33.6 31.5 - 36.5 g/dL Final    RDW 12.2 10.0 - 15.0 %  Final    Platelet Count 234 150 - 450 10e9/L Final    Diff Method Automated Method  Final    % Neutrophils 73.0 % Final    % Lymphocytes 19.3 % Final    % Monocytes 6.6 % Final    % Eosinophils 0.5 % Final    % Basophils 0.3 % Final    % Immature Granulocytes 0.3 % Final    Nucleated RBCs 0 0 /100 Final    Absolute Neutrophil 7.2 1.6 - 8.3 10e9/L Final    Absolute Lymphocytes 1.9 0.8 - 5.3 10e9/L Final    Absolute Monocytes 0.7 0.0 - 1.3 10e9/L Final    Absolute Eosinophils 0.1 0.0 - 0.7 10e9/L Final    Absolute Basophils 0.0 0.0 - 0.2 10e9/L Final    Abs Immature Granulocytes 0.0 0 - 0.4 10e9/L Final    Absolute Nucleated RBC 0.0  Final         7/28/2018  1:23 AM      Component Results     Component Value Ref Range & Units Status    Troponin I ES <0.015 0.000 - 0.045 ug/L Final    The 99th percentile for upper reference range is 0.045 ug/L.  Troponin values   in the range of 0.045 - 0.120 ug/L may be associated with risks of adverse   clinical events.           7/28/2018  1:21 AM      Component Results     Component Value Ref Range & Units Status    Sodium 141 133 - 144 mmol/L Final    Potassium 3.7 3.4 - 5.3 mmol/L Final    Chloride 107 94 - 109 mmol/L Final    Carbon Dioxide 26 20 - 32 mmol/L Final    Anion Gap 8 3 - 14 mmol/L Final    Glucose 139 (H) 70 - 99 mg/dL Final    Urea Nitrogen 25 7 - 30 mg/dL Final    Creatinine 0.73 0.52 - 1.04 mg/dL Final    GFR Estimate 82 >60 mL/min/1.7m2 Final    Non  GFR Calc    GFR Estimate If Black >90 >60 mL/min/1.7m2 Final    African American GFR Calc    Calcium 9.3 8.5 - 10.1 mg/dL Final    Bilirubin Total 0.3 0.2 - 1.3 mg/dL Final    Albumin 3.8 3.4 - 5.0 g/dL Final    Protein Total 7.2 6.8 - 8.8 g/dL Final    Alkaline Phosphatase 61 40 - 150 U/L Final    ALT 32 0 - 50 U/L Final    AST 20 0 - 45 U/L Final         7/28/2018  1:21 AM      Component Results     Component Value Ref Range & Units Status    Lipase 247 73 - 393 U/L Final         7/28/2018  1:34 AM       Narrative     CHEST 2 VIEWS  7/28/2018 1:28 AM     HISTORY: Chest pain.    COMPARISON: 4/30/2016.    FINDINGS: The lungs are clear. Normal-sized cardiac silhouette.        Impression     IMPRESSION: No evidence of active cardiopulmonary disease.                Clinical Quality Measure: Blood Pressure Screening     Your blood pressure was checked while you were in the emergency department today. The last reading we obtained was  BP: 127/72 . Please read the guidelines below about what these numbers mean and what you should do about them.  If your systolic blood pressure (the top number) is less than 120 and your diastolic blood pressure (the bottom number) is less than 80, then your blood pressure is normal. There is nothing more that you need to do about it.  If your systolic blood pressure (the top number) is 120-139 or your diastolic blood pressure (the bottom number) is 80-89, your blood pressure may be higher than it should be. You should have your blood pressure rechecked within a year by a primary care provider.  If your systolic blood pressure (the top number) is 140 or greater or your diastolic blood pressure (the bottom number) is 90 or greater, you may have high blood pressure. High blood pressure is treatable, but if left untreated over time it can put you at risk for heart attack, stroke, or kidney failure. You should have your blood pressure rechecked by a primary care provider within the next 4 weeks.  If your provider in the emergency department today gave you specific instructions to follow-up with your doctor or provider even sooner than that, you should follow that instruction and not wait for up to 4 weeks for your follow-up visit.        Thank you for choosing [facility]       Thank you for choosing [facility] for your care. Our goal is always to provide you with excellent care. Hearing back from our patients is one way we can continue to improve our services. Please take a few minutes to  complete the written survey that you may receive in the mail after you visit with us. Thank you!        [employer] Information     [employer] gives you secure access to your electronic health record. If you see a primary care provider, you can also send messages to your care team and make appointments. If you have questions, please call your primary care clinic.  If you do not have a primary care provider, please call [phone] and they will assist you.        Care EveryWhere ID     This is your Care EveryWhere ID. This could be used by other organizations to access your [facility] medical records  [id number]        Equal Access to Services     [facility] : Yadiel Mcguire, marilu santo, bridget lloyd. So [facility] [phone].    ATENCIÓN: Si habla español, tiene a jackson disposición servicios gratuitos de asistencia lingüística. Llame al [phone].    We comply with applicable federal civil rights laws and [place] laws. We do not discriminate on the basis of race, color, national origin, age, disability, sex, sexual orientation, or gender identity.            After Visit Summary       This is your record. Keep this with you and show to your community pharmacist(s) and doctor(s) at your next visit.

## 2018-08-09 ENCOUNTER — PATIENT OUTREACH (OUTPATIENT)
Dept: CARE COORDINATION | Facility: CLINIC | Age: 58
End: 2018-08-09

## 2018-08-09 NOTE — PROGRESS NOTES
Clinic Care Coordination Contact    Clinic Care Coordination Contact  OUTREACH       Chief Complaint   Patient presents with     Clinic Care Coordination - Post Hospital     ED visit        Universal Utilization:      Utilization    Last refreshed: 8/3/2018  9:35 PM:  No Show Count (past year) 0       Last refreshed: 8/3/2018  9:35 PM:  ED visits 1       Last refreshed: 8/3/2018  9:35 PM:  Hospital admissions 0          Current as of: 8/3/2018  9:35 PM             Clinical Concerns:  Patient was recently evaluated in ED for Chest pain. Patient state that she is doing well and she did not have any concerns for follow up at time of call.   RN CC discussed recommendation for PCP follow up, patient declined but appreciated the outreach.   Patient/Caregiver understanding: Patient verbalized understanding, engaged in AIDET communication behavior during encounter.        Plan:   RN CC will be available in future if needed, contact information given, no further outreach at this time.     Lalita Crabtree RN  Clinic Care Coordinator  Mobile: [phone]  [email or web address]

## 2018-08-23 DIAGNOSIS — M10.9 ACUTE GOUTY ARTHRITIS: ICD-10-CM

## 2018-08-23 RX ORDER — COLCHICINE 0.6 MG/1
TABLET ORAL
Qty: 30 TABLET | Refills: 0 | OUTPATIENT
Start: 2018-08-23

## 2018-08-23 NOTE — TELEPHONE ENCOUNTER
JC can you respond - pt last seen 1/2018 - already given a 30 day ext no appt scheduled,  needs office visit    Pending Prescriptions:                       Disp   Refills    colchicine (COLCRYS) 0.6 MG tablet        30 tab*0            Sig: Take 1 tablet daily

## 2018-08-27 NOTE — TELEPHONE ENCOUNTER
Patient's pharmacy called to follow up with us about a refill for Colcrys. I spoke with Dr. Santo and he states he is denying this because the patient is due to be seen.    I called the pharmacy back and they are refusing the request and informing the patient that they need an OV.    .Jessy Matt, NAKUL

## 2018-09-05 ENCOUNTER — HOSPITAL ENCOUNTER (OUTPATIENT)
Dept: MAMMOGRAPHY | Facility: CLINIC | Age: 58
Discharge: HOME | End: 2018-09-05
Attending: OBSTETRICS & GYNECOLOGY | Admitting: OBSTETRICS & GYNECOLOGY
Payer: COMMERCIAL

## 2018-09-05 DIAGNOSIS — Z12.31 VISIT FOR SCREENING MAMMOGRAM: ICD-10-CM

## 2018-09-05 PROCEDURE — 77063 BREAST TOMOSYNTHESIS BI: CPT

## 2018-09-07 ENCOUNTER — OFFICE VISIT (OUTPATIENT)
Dept: FAMILY MEDICINE | Facility: CLINIC | Age: 58
End: 2018-09-07

## 2018-09-07 VITALS
WEIGHT: 289 LBS | SYSTOLIC BLOOD PRESSURE: 130 MMHG | OXYGEN SATURATION: 97 % | HEART RATE: 70 BPM | BODY MASS INDEX: 46.65 KG/M2 | TEMPERATURE: 97.6 F | DIASTOLIC BLOOD PRESSURE: 68 MMHG

## 2018-09-07 DIAGNOSIS — M25.50 MULTIPLE JOINT PAIN: Primary | ICD-10-CM

## 2018-09-07 DIAGNOSIS — E66.01 MORBID OBESITY (H): ICD-10-CM

## 2018-09-07 DIAGNOSIS — Z23 NEED FOR VACCINATION: ICD-10-CM

## 2018-09-07 DIAGNOSIS — M10.9 ACUTE GOUT INVOLVING TOE, UNSPECIFIED CAUSE, UNSPECIFIED LATERALITY: ICD-10-CM

## 2018-09-07 LAB
ERYTHROCYTE [DISTWIDTH] IN BLOOD BY AUTOMATED COUNT: 12.7 %
HCT VFR BLD AUTO: 43.2 % (ref 35–47)
HEMOGLOBIN: 13.9 G/DL (ref 11.7–15.7)
MCH RBC QN AUTO: 29.7 PG (ref 26–33)
MCHC RBC AUTO-ENTMCNC: 32.2 G/DL (ref 31–36)
MCV RBC AUTO: 92.3 FL (ref 78–100)
PLATELET COUNT - QUEST: 315 10^9/L (ref 150–375)
RBC # BLD AUTO: 4.68 10*12/L (ref 3.8–5.2)
WBC # BLD AUTO: 6.8 10*9/L (ref 4–11)

## 2018-09-07 PROCEDURE — 86618 LYME DISEASE ANTIBODY: CPT | Mod: 90 | Performed by: PHYSICIAN ASSISTANT

## 2018-09-07 PROCEDURE — 90686 IIV4 VACC NO PRSV 0.5 ML IM: CPT | Performed by: PHYSICIAN ASSISTANT

## 2018-09-07 PROCEDURE — 90471 IMMUNIZATION ADMIN: CPT | Performed by: PHYSICIAN ASSISTANT

## 2018-09-07 PROCEDURE — 85027 COMPLETE CBC AUTOMATED: CPT | Performed by: PHYSICIAN ASSISTANT

## 2018-09-07 PROCEDURE — 36415 COLL VENOUS BLD VENIPUNCTURE: CPT | Performed by: PHYSICIAN ASSISTANT

## 2018-09-07 PROCEDURE — 84550 ASSAY OF BLOOD/URIC ACID: CPT | Mod: 90 | Performed by: PHYSICIAN ASSISTANT

## 2018-09-07 PROCEDURE — 99213 OFFICE O/P EST LOW 20 MIN: CPT | Mod: 25 | Performed by: PHYSICIAN ASSISTANT

## 2018-09-07 RX ORDER — COLCHICINE 0.6 MG/1
TABLET ORAL
Qty: 30 TABLET | Refills: 0 | Status: ON HOLD | OUTPATIENT
Start: 2018-09-07 | End: 2019-10-15

## 2018-09-07 ASSESSMENT — PATIENT HEALTH QUESTIONNAIRE - PHQ9: 5. POOR APPETITE OR OVEREATING: NOT AT ALL

## 2018-09-07 ASSESSMENT — ANXIETY QUESTIONNAIRES
1. FEELING NERVOUS, ANXIOUS, OR ON EDGE: NOT AT ALL
3. WORRYING TOO MUCH ABOUT DIFFERENT THINGS: NOT AT ALL
7. FEELING AFRAID AS IF SOMETHING AWFUL MIGHT HAPPEN: NOT AT ALL
GAD7 TOTAL SCORE: 0
IF YOU CHECKED OFF ANY PROBLEMS ON THIS QUESTIONNAIRE, HOW DIFFICULT HAVE THESE PROBLEMS MADE IT FOR YOU TO DO YOUR WORK, TAKE CARE OF THINGS AT HOME, OR GET ALONG WITH OTHER PEOPLE: NOT DIFFICULT AT ALL
5. BEING SO RESTLESS THAT IT IS HARD TO SIT STILL: NOT AT ALL
2. NOT BEING ABLE TO STOP OR CONTROL WORRYING: NOT AT ALL
6. BECOMING EASILY ANNOYED OR IRRITABLE: NOT AT ALL

## 2018-09-07 NOTE — PROGRESS NOTES
CC: Gout flare.    History:  Rupal is here today with gout flare that started last night. Left foot great toe and left pointer finger were both hurting last night. Now this morning these are better, but right great toe is hurting. No fever, sweats, chills. Has had uric acid checked 11/2016 and was 8.4. Had asparagus the day before onset of this episode, and had 1 beer last week. Overall, is very cautious of diet.      Has been seeing homeopath for possible Lyme disease. Has been using Dezbio through them, and this has been helping significantly.     PMH, MEDICATIONS, ALLERGIES, SOCIAL AND FAMILY HISTORY in [facility] and reviewed by me personally.      ROS negative other than the symptoms noted above in the HPI.        Examination   /68 (BP Location: Left arm, Patient Position: Sitting, Cuff Size: Adult Large)  Pulse 70  Temp 97.6  F (36.4  C) (Oral)  Wt 131.1 kg (289 lb)  LMP 03/10/2013  SpO2 97%  BMI 46.65 kg/m2       Constitutional: Sitting comfortably, in no acute distress. Vital signs noted  Neck:  no adenopathy, trachea midline and normal to palpation  Cardiovascular:  regular rate and rhythm, no murmurs, clicks, or gallops  Respiratory:  normal respiratory rate and rhythm, lungs clear to auscultation  M/S: Right great toe with mild erythema, mild tenderness to palpation. ROM intact.   NEURO:  muscle strength normal, reflexes normal and symmetric  SKIN: No jaundice/pallor/rash.   Psychiatric: mentation appears normal and affect normal/bright        A/P    ICD-10-CM    1. Multiple joint pain M25.50 URIC ACID ([facility])     Lymes Antibodies Total ([facility])     VENOUS COLLECTION     HEMOGRAM/PLATELET ([facility])   2. Morbid obesity (H) E66.01    3. Acute gout involving toe, unspecified cause, unspecified laterality M10.9 colchicine (COLCRYS) 0.6 MG tablet   4. Need for vaccination Z23 VACCINE ADMINISTRATION, INITIAL     HC FLU VAC PRESRV FREE QUAD SPLIT VIR 3+YRS IM       DISCUSSION:  Suspicious, but not convincing  for gout given quickly traveling joint pain. Will check uric acid, Lyme test, CBC today, and contact Rupal next week with results. Based on this, will likely recommend suppressive allopurinol therapy. Gave course of colchicine to help with immediate symptoms. She has tolerated this well before. Warned of side effects.     follow up visit: As needed, 2 months if started on allopurinol.     Aniyah Meyers PA-C  [facility]

## 2018-09-07 NOTE — NURSING NOTE
Rupal is here for gout--- ate asparagus for dinner and gout flare up this morning on both feet      Pre-visit Screening:  Immunizations:  up to date  Colonoscopy:  is up to date  Mammogram: is up to date  Asthma Action Test/Plan:  Yes  PHQ9:  Done today  GAD7:  Done today  Questioned patient about current smoking habits Pt. quit smoking some time ago.  Ok to leave detailed message on voice mail for today's visit only Yes, phone # [phone]

## 2018-09-08 LAB
LYME SCREEN IGG AND IGM: <0.9 INDEX
URATE SERPL-MCNC: 8.7 MG/DL (ref 2.5–7)

## 2018-09-08 ASSESSMENT — ANXIETY QUESTIONNAIRES: GAD7 TOTAL SCORE: 0

## 2018-09-08 ASSESSMENT — ASTHMA QUESTIONNAIRES: ACT_TOTALSCORE: 23

## 2018-09-08 ASSESSMENT — PATIENT HEALTH QUESTIONNAIRE - PHQ9: SUM OF ALL RESPONSES TO PHQ QUESTIONS 1-9: 0

## 2018-10-05 ENCOUNTER — OFFICE VISIT (OUTPATIENT)
Dept: FAMILY MEDICINE | Facility: CLINIC | Age: 58
End: 2018-10-05

## 2018-10-05 VITALS
WEIGHT: 287.4 LBS | BODY MASS INDEX: 46.39 KG/M2 | HEART RATE: 75 BPM | OXYGEN SATURATION: 98 % | TEMPERATURE: 97.6 F | DIASTOLIC BLOOD PRESSURE: 72 MMHG | SYSTOLIC BLOOD PRESSURE: 122 MMHG

## 2018-10-05 DIAGNOSIS — L72.3 INFLAMED SEBACEOUS CYST: Primary | ICD-10-CM

## 2018-10-05 PROCEDURE — 99213 OFFICE O/P EST LOW 20 MIN: CPT | Mod: 25 | Performed by: PHYSICIAN ASSISTANT

## 2018-10-05 PROCEDURE — 10060 I&D ABSCESS SIMPLE/SINGLE: CPT | Performed by: PHYSICIAN ASSISTANT

## 2018-10-05 NOTE — NURSING NOTE
Rupal is here for cyst on lower left abdomen, for 3 months, sometimes painful          Pre-visit Screening:  Immunizations:  up to date  Colonoscopy:  is up to date  Mammogram: is up to date  Asthma Action Test/Plan:  NA  PHQ9:  none  GAD7:  none  Questioned patient about current smoking habits Pt. quit smoking some time ago.  Ok to leave detailed message on voice mail for today's visit only Yes, phone # [phone]

## 2018-10-05 NOTE — MR AVS SNAPSHOT
After Visit Summary   10/5/2018    Rupal Puri    MRN: [id number]           Patient Information     Date Of Birth          1960        Visit Information        Provider Department      10/5/2018 1:45 PM Aniyah Meyers PA-C [facility]        Today's Diagnoses     Inflamed sebaceous cyst    -  1       Follow-ups after your visit        Follow-up notes from your care team     Return if symptoms worsen or fail to improve.      Who to contact     If you have questions or need follow up information about today's clinic visit or your schedule please contact [facility] directly at [phone].  Normal or non-critical lab and imaging results will be communicated to you by MyChart, letter or phone within 4 business days after the clinic has received the results. If you do not hear from us within 7 days, please contact the clinic through Tendrilhart or phone. If you have a critical or abnormal lab result, we will notify you by phone as soon as possible.  Submit refill requests through FilterSure or call your pharmacy and they will forward the refill request to us. Please allow 3 business days for your refill to be completed.          Additional Information About Your Visit        MyChart Information     FilterSure gives you secure access to your electronic health record. If you see a primary care provider, you can also send messages to your care team and make appointments. If you have questions, please call your primary care clinic.  If you do not have a primary care provider, please call [phone] and they will assist you.        Care EveryWhere ID     This is your Care EveryWhere ID. This could be used by other organizations to access your [facility] medical records  [id number]        Your Vitals Were     Pulse Temperature Last Period Pulse Oximetry BMI (Body Mass Index)       75 97.6  F (36.4  C) (Oral) 03/10/2013 98% 46.39 kg/m2        Blood  Pressure from Last 3 Encounters:   10/05/18 122/72   09/07/18 130/68   07/28/18 127/72    Weight from Last 3 Encounters:   10/05/18 130.4 kg (287 lb 6.4 oz)   09/07/18 131.1 kg (289 lb)   07/27/18 129.3 kg (285 lb)              Today, you had the following     No orders found for display       Primary Care Provider Office Phone # Fax #    PAUL Smart [phone] [address]  [facility] [address]        Equal Access to Services     [facility]: Hadii aad ku hadasho Soomaali, waaxda luqadaha, qaybta kaalmada adeegyada, bridget antonio haymiranda morin . So [facility] [phone].    ATENCIÓN: Si habla español, tiene a jackson disposición servicios gratuitos de asistencia lingüística. [facility] [phone].    We comply with applicable federal civil rights laws and [place] laws. We do not discriminate on the basis of race, color, national origin, age, disability, sex, sexual orientation, or gender identity.            Thank you!     Thank you for choosing [facility]  for your care. Our goal is always to provide you with excellent care. Hearing back from our patients is one way we can continue to improve our services. Please take a few minutes to complete the written survey that you may receive in the mail after your visit with us. Thank you!             Your Updated Medication List - Protect others around you: Learn how to safely use, store and throw away your medicines at www.disposemymeds.org.          This list is accurate as of 10/5/18 11:59 PM.  Always use your most recent med list.                   Brand Name Dispense Instructions for use Diagnosis    ACETAMINOPHEN PO           * colchicine 0.6 MG tablet    COLCRYS    30 tablet    Take 1 tablet daily    Acute gouty arthritis       * colchicine 0.6 MG tablet    COLCRYS    30 tablet    Take 2 tabs by mouth at the first sign of a gout attack, then 1 tab 1 hour later.  Max 3 tabs over 1 hour.  Then 1 tablet twice daily.    Acute gout involving toe, unspecified cause, unspecified laterality       rizatriptan 10 MG ODT tab    MAXALT-MLT    18 tablet    DISSOLVE 1 TABLET ON TONGUE AT ONSET OF HEADACHE. MAY REPEAT IN 2 HOURS. MAX 3 TABLETS PER DAY    Migraine without aura and without status migrainosus, not intractable       * Notice:  This list has 2 medication(s) that are the same as other medications prescribed for you. Read the directions carefully, and ask your doctor or other care provider to review them with you.

## 2018-10-05 NOTE — PROGRESS NOTES
CC: Cyst    History:  Rupal is here today with a cyst on her left abdomen that developed 2-3 months ago. It has been stable, but now just yesterday got larger, and slightly painful, and red. No drainage. No fever, sweats, chills, spreading redness. Has not been heating or taking ibuprofen.     PMH, MEDICATIONS, ALLERGIES, SOCIAL AND FAMILY HISTORY in Psychiatric and reviewed by me personally.      ROS negative other than the symptoms noted above in the HPI.        Examination   /72 (BP Location: Left arm, Patient Position: Sitting, Cuff Size: Adult Large)  Pulse 75  Temp 97.6  F (36.4  C) (Oral)  Wt 130.4 kg (287 lb 6.4 oz)  LMP 03/10/2013  SpO2 98%  BMI 46.39 kg/m2       Constitutional: Sitting comfortably, in no acute distress. Vital signs noted  Neck:  no adenopathy, trachea midline and normal to palpation  Cardiovascular:  regular rate and rhythm, no murmurs, clicks, or gallops  Respiratory:  normal respiratory rate and rhythm, lungs clear to auscultation  Abdomen: Abdomen soft, non-tender. BS normal. No masses, organomegaly  SKIN: No jaundice/pallor. Small cyst approximately 1 cm in diameter on left abdomen with moderate local erythema. No drainage. Mildly tener to palpation.   Psychiatric: mentation appears normal and affect normal/bright    Procedure: Lesion cleansed with alcohol swab. 11 blade used to make small incision. Purulent discharged expressed gently. Thin layer of bacitracin applied. Covered with bandage. Patient tolerated well.     A/P    ICD-10-CM    1. Inflamed sebaceous cyst L72.3        DISCUSSION:  Consistent with inflamed sebaceous cyst. Now that it is draining, recommended apply heat 2-3 times daily for 15 minutes. Apply thin layer of bacitracin twice daily. Keep covered during day, but okay to keep open at night in clean environment. If persistent or worsening, may need antibiotics, or to see dermatologist for removal as it would likely require a full excision.     She will contact me  early next week if not improving, to coordinate this next stop. She has a dermatologist that she would go to.     follow up visit: As needed    Aniyah Meyers PA-C  [facility]

## 2018-11-26 ENCOUNTER — OFFICE VISIT (OUTPATIENT)
Dept: FAMILY MEDICINE | Facility: CLINIC | Age: 58
End: 2018-11-26

## 2018-11-26 VITALS
OXYGEN SATURATION: 98 % | DIASTOLIC BLOOD PRESSURE: 70 MMHG | SYSTOLIC BLOOD PRESSURE: 122 MMHG | TEMPERATURE: 97.8 F | BODY MASS INDEX: 45.68 KG/M2 | WEIGHT: 283 LBS | HEART RATE: 74 BPM

## 2018-11-26 DIAGNOSIS — M10.9 ACUTE GOUT OF RIGHT WRIST, UNSPECIFIED CAUSE: Primary | ICD-10-CM

## 2018-11-26 PROCEDURE — 99213 OFFICE O/P EST LOW 20 MIN: CPT | Performed by: PHYSICIAN ASSISTANT

## 2018-11-26 NOTE — PROGRESS NOTES
SUBJECTIVE:   Rupal Puri is a 58 year old female who presents to clinic today for the following health issues:    Joint Pain    Onset: this am     Description:   Location: right wrist  Character: Tender, feels itching inside, ache    Intensity: moderate    Progression of Symptoms: better    Accompanying Signs & Symptoms:  Other symptoms: radiation of pain to elbow - only if she hits it    History:   Previous similar pain: no       Precipitating factors:   Trauma or overuse: no     Alleviating factors:  Improved by: wrapping    Therapies Tried and outcome:     Just wrapped it with bandage    Pt is right handed    Has had Podagra pain in both joints  ? Gout with elevated uric acid levels in the past  Triggers are tomato, spinach  Father with gout        Problem list and histories reviewed & adjusted, as indicated.  Additional history: none    Patient Active Problem List   Diagnosis     Mixed hyperlipidemia     Metabolic syndrome     Cough variant asthma     Health Care Home     Arthritis of knee     Knee pain     Cellulitis     History of GI bleed 2014     ACP (advance care planning)     Shoulder pain     Chronic pain of right knee     Internal hemorrhoids - per 2017 colonoscopy     Benign neoplasm of cecum 2017 colonoscopy - repeat 2022     Pain of both hip joints     Migraine without aura and without status migrainosus, not intractable     Morbid obesity (H)     Past Surgical History:   Procedure Laterality Date     ARTHROPLASTY KNEE  4/23/2013    Procedure: ARTHROPLASTY KNEE;  RIGHT TOTAL KNEE ARTHROPLASTY (IFEOMA)^;  Surgeon: Simon Pitts MD;  Location: [facility] OR     ARTHROPLASTY KNEE  7/17/2014    Procedure: ARTHROPLASTY KNEE;  Surgeon: Simon Pitts MD;  Location: [facility] OR     AS REPAIR FEMORAL HERNIA,REDUCIBLE  1991    hernia repair femoral     ESOPHAGOSCOPY, GASTROSCOPY, DUODENOSCOPY (EGD), COMBINED  7/22/2014    Procedure: COMBINED ESOPHAGOSCOPY, GASTROSCOPY, DUODENOSCOPY (EGD), BIOPSY SINGLE OR  MULTIPLE;  Surgeon: Natalie Gibbons MD;  Location:  GI     TONSILLECTOMY & ADENOIDECTOMY  1966    age 6       Social History   Substance Use Topics     Smoking status: Former Smoker     Packs/day: 1.00     Years: 5.00     Quit date: 3/20/1988     Smokeless tobacco: Never Used     Alcohol use 1.2 oz/week     2 Standard drinks or equivalent per week     Family History   Problem Relation Age of Onset     Osteoporosis Mother      Ovarian Cancer Mother 35     Hypertension Mother      Leukemia Mother      Cancer Father      kidney     C.A.D. Father      early disease     Gout Father      Macular Degeneration Brother      Depression Daughter      Anxiety Disorder Daughter      Neurologic Disorder Maternal Grandmother      TIA's     Diabetes Maternal Grandmother      Lipids Brother      Cancer - colorectal No family hx of      Breast Cancer No family hx of      Anesthesia Reaction No family hx of      Crohn Disease No family hx of      Ulcerative Colitis No family hx of      Colon Polyps No family hx of          Current Outpatient Prescriptions   Medication Sig Dispense Refill     ACETAMINOPHEN PO        rizatriptan (MAXALT-MLT) 10 MG ODT tab DISSOLVE 1 TABLET ON TONGUE AT ONSET OF HEADACHE. MAY REPEAT IN 2 HOURS. MAX 3 TABLETS PER DAY 18 tablet 3     colchicine (COLCRYS) 0.6 MG tablet Take 2 tabs by mouth at the first sign of a gout attack, then 1 tab 1 hour later.  Max 3 tabs over 1 hour. Then 1 tablet twice daily. 30 tablet 0     colchicine (COLCRYS) 0.6 MG tablet Take 1 tablet daily 30 tablet 0     Allergies   Allergen Reactions     Ibuprofen GI Disturbance     2 days of gi upset after taking any NSAID     BP Readings from Last 3 Encounters:   11/26/18 122/70   10/05/18 122/72   09/07/18 130/68    Wt Readings from Last 3 Encounters:   11/26/18 128.4 kg (283 lb)   10/05/18 130.4 kg (287 lb 6.4 oz)   09/07/18 131.1 kg (289 lb)                    Reviewed and updated as needed this visit by clinical staff  Tobacco   Allergies  Meds  Problems       Reviewed and updated as needed this visit by Provider  Meds  Problems         ROS:  Constitutional, HEENT, cardiovascular, pulmonary, gi and gu systems are negative, except as otherwise noted.    OBJECTIVE:     /70 (BP Location: Left arm, Patient Position: Sitting, Cuff Size: Adult Large)  Pulse 74  Temp 97.8  F (36.6  C) (Oral)  Wt 128.4 kg (283 lb)  LMP 03/10/2013  SpO2 98%  BMI 45.68 kg/m2  Body mass index is 45.68 kg/(m^2).     WRIST:  Inspection: no swelling  no effusion  Palpation: Tender: triquetrum  Non-tender: distal radius, distal ulna, extension tendons, flexor tendons, scaphoid, lunate, hook of hamate, carpals, snuff box  Range of Motion: full- not painful  Strength: no deficits      ASSESSMENT/PLAN:     (M10.9) Acute gout of right wrist, unspecified cause  (primary encounter diagnosis)  Comment: new  Plan: start Colchicine that she has at home.  Consider allopurinol if this recurs to lower Uric acid levels    RTC 2 weeks for xray if not improving      PAUL Smart  [facility]

## 2018-11-26 NOTE — MR AVS SNAPSHOT
After Visit Summary   11/26/2018    Rupal Puri    MRN: [id number]           Patient Information     Date Of Birth          1960        Visit Information        Provider Department      11/26/2018 1:00 PM Denia Salomon PA [facility]        Today's Diagnoses     Acute gout of right wrist, unspecified cause    -  1       Follow-ups after your visit        Who to contact     If you have questions or need follow up information about today's clinic visit or your schedule please contact [facility] directly at [phone].  Normal or non-critical lab and imaging results will be communicated to you by [employer]art, letter or phone within 4 business days after the clinic has received the results. If you do not hear from us within 7 days, please contact the clinic through [employer] or phone. If you have a critical or abnormal lab result, we will notify you by phone as soon as possible.  Submit refill requests through Jet or call your pharmacy and they will forward the refill request to us. Please allow 3 business days for your refill to be completed.          Additional Information About Your Visit        MyChart Information     Jet gives you secure access to your electronic health record. If you see a primary care provider, you can also send messages to your care team and make appointments. If you have questions, please call your primary care clinic.  If you do not have a primary care provider, please call [phone] and they will assist you.        Care EveryWhere ID     This is your Care EveryWhere ID. This could be used by other organizations to access your [facility] medical records  [id number]        Your Vitals Were     Pulse Temperature Last Period Pulse Oximetry BMI (Body Mass Index)       74 97.8  F (36.6  C) (Oral) 03/10/2013 98% 45.68 kg/m2        Blood Pressure from Last 3 Encounters:   11/26/18 122/70   10/05/18 122/72    09/07/18 130/68    Weight from Last 3 Encounters:   11/26/18 128.4 kg (283 lb)   10/05/18 130.4 kg (287 lb 6.4 oz)   09/07/18 131.1 kg (289 lb)              Today, you had the following     No orders found for display       Primary Care Provider Office Phone # Fax #    PAUL Smart [phone] [address]  [facility] [address]        Equal Access to Services     [facility] : Hadii aad ku hadasho Soomaali, waaxda luqadaha, qaybta kaalmada adeegyada, waxay idiin hayaan adeeg kharash lajose eduardo . So [facility] [phone].    ATENCIÓN: Si habla español, tiene a jackson disposición servicios gratuitos de asistencia lingüística. Llame al [phone].    We comply with applicable federal civil rights laws and [place] laws. We do not discriminate on the basis of race, color, national origin, age, disability, sex, sexual orientation, or gender identity.            Thank you!     Thank you for choosing [facility]  for your care. Our goal is always to provide you with excellent care. Hearing back from our patients is one way we can continue to improve our services. Please take a few minutes to complete the written survey that you may receive in the mail after your visit with us. Thank you!             Your Updated Medication List - Protect others around you: Learn how to safely use, store and throw away your medicines at www.disposemymeds.org.          This list is accurate as of 11/26/18  3:14 PM.  Always use your most recent med list.                   Brand Name Dispense Instructions for use Diagnosis    ACETAMINOPHEN PO           * colchicine 0.6 MG tablet    COLCRYS    30 tablet    Take 1 tablet daily    Acute gouty arthritis       * colchicine 0.6 MG tablet    COLCRYS    30 tablet    Take 2 tabs by mouth at the first sign of a gout attack, then 1 tab 1 hour later.  Max 3 tabs over 1 hour. Then 1 tablet twice daily.    Acute gout involving toe, unspecified  cause, unspecified laterality       rizatriptan 10 MG ODT tab    MAXALT-MLT    18 tablet    DISSOLVE 1 TABLET ON TONGUE AT ONSET OF HEADACHE. MAY REPEAT IN 2 HOURS. MAX 3 TABLETS PER DAY    Migraine without aura and without status migrainosus, not intractable       * Notice:  This list has 2 medication(s) that are the same as other medications prescribed for you. Read the directions carefully, and ask your doctor or other care provider to review them with you.

## 2018-11-26 NOTE — NURSING NOTE
Rupal is here because she woke up with right wrist pain, hurts to push not pull, new onset            Pre-visit Screening:  Immunizations:  up to date  Colonoscopy:  is up to date  Mammogram: is up to date  Asthma Action Test/Plan:  NA  PHQ9:  none  GAD7:  none  Questioned patient about current smoking habits Pt. Quit some time ago  Ok to leave detailed message on voice mail for today's visit only Yes, phone # [phone]

## 2019-02-21 ENCOUNTER — OFFICE VISIT (OUTPATIENT)
Dept: FAMILY MEDICINE | Facility: CLINIC | Age: 59
End: 2019-02-21

## 2019-02-21 VITALS
HEART RATE: 77 BPM | HEIGHT: 65 IN | DIASTOLIC BLOOD PRESSURE: 72 MMHG | WEIGHT: 282 LBS | BODY MASS INDEX: 46.98 KG/M2 | SYSTOLIC BLOOD PRESSURE: 122 MMHG | OXYGEN SATURATION: 97 % | TEMPERATURE: 97.6 F

## 2019-02-21 DIAGNOSIS — G89.29 CHRONIC PAIN OF RIGHT KNEE: ICD-10-CM

## 2019-02-21 DIAGNOSIS — M79.651 RIGHT THIGH PAIN: Primary | ICD-10-CM

## 2019-02-21 DIAGNOSIS — M25.561 CHRONIC PAIN OF RIGHT KNEE: ICD-10-CM

## 2019-02-21 PROCEDURE — 99213 OFFICE O/P EST LOW 20 MIN: CPT | Performed by: PHYSICIAN ASSISTANT

## 2019-02-21 ASSESSMENT — MIFFLIN-ST. JEOR: SCORE: 1865.02

## 2019-02-21 NOTE — NURSING NOTE
Rupal is here for an imaging referral for PT.          Pre-visit Screening:  Immunizations:  up to date  Colonoscopy:  is up to date  Mammogram: is completed today  Asthma Action Test/Plan:  NA  PHQ9:  NA  GAD7:  NA  Questioned patient about current smoking habits Pt. quit smoking some time ago.  Ok to leave detailed message on voice mail for today's visit only Yes, phone # [phone]

## 2019-02-21 NOTE — PROGRESS NOTES
"CC: Consult pain    History:  2 years ago, Rupal was in gym, and felt a sudden terrible pain in right hamstring/posterior thigh.    Since that time pain did improve through physical therapy, but still feels like it is significantly limiting her function and comfort. Can't get out of car without pain, can't clip toenails.     I do not have these records, but Rupal explains to me that she did undergo an MRI of her pelvis through her ortho, who pointed out a partial tear of her proximal hamstring that was still within the imaging range of the pelvic MRI, but they did not feel this would be an issue corrected by surgery. Rupal is wondering if now after so many months and even years of this ongoing issue should further imaging be done prior to her doing more PT for is starting this coming Tuesday.     Complicating the matter is Rupal has also been having to see PTs for hip pain and shoulder pain intermittently since the injury to her right knee/hamstring. She has had both knees replaced in 2013 and 2014 prior to injury to her posterior thigh.     PMH, MEDICATIONS, ALLERGIES, SOCIAL AND FAMILY HISTORY in EPIC and reviewed by me personally.      ROS negative other than the symptoms noted above in the HPI.        Examination   /72 (BP Location: Left arm, Patient Position: Sitting, Cuff Size: Adult Large)   Pulse 77   Temp 97.6  F (36.4  C) (Oral)   Ht 1.651 m (5' 5\")   Wt 127.9 kg (282 lb)   LMP 03/10/2013   SpO2 97%   BMI 46.93 kg/m         Constitutional: Sitting comfortably, in no acute distress. Vital signs noted  Psychiatric: mentation appears normal and affect normal/bright        A/P    ICD-10-CM    1. Right thigh pain M79.651    2. Chronic pain of right knee M25.561     G89.29        DISCUSSION:  Explained to Rupal that prior to any further MRIs being ordered it would be essential to have the initial MRI of her hip to reference. Ideally the decision for further imaging would come from her orthopedic " provider who would be better equipped to determine appropriate next steps, but I would consider ordering an MRI of her right thigh based on what initial MRI of hip/proximal thigh shows. She will work on getting me this record and we will go from there.     follow up visit: As needed    [facility]

## 2019-02-22 ENCOUNTER — TELEPHONE (OUTPATIENT)
Dept: FAMILY MEDICINE | Facility: CLINIC | Age: 59
End: 2019-02-22

## 2019-02-26 ENCOUNTER — TRANSFERRED RECORDS (OUTPATIENT)
Dept: FAMILY MEDICINE | Facility: CLINIC | Age: 59
End: 2019-02-26

## 2019-02-26 ENCOUNTER — THERAPY VISIT (OUTPATIENT)
Dept: PHYSICAL THERAPY | Facility: CLINIC | Age: 59
End: 2019-02-26
Payer: COMMERCIAL

## 2019-02-26 DIAGNOSIS — M25.551 HIP PAIN, RIGHT: Primary | ICD-10-CM

## 2019-02-26 PROCEDURE — 97110 THERAPEUTIC EXERCISES: CPT | Mod: GP | Performed by: PHYSICAL THERAPIST

## 2019-02-26 PROCEDURE — 97162 PT EVAL MOD COMPLEX 30 MIN: CPT | Mod: GP | Performed by: PHYSICAL THERAPIST

## 2019-02-26 NOTE — PROGRESS NOTES
Physical Therapy Initial Evaluation    Precautions/Restrictions/MD instructions: PT eval and treat.       Subjective History:    Injury/Condition Details:  Presenting Complaint Rupal presents with R) hip and leg pain. Knows she tore her hamstring ~2 years ago, has been to PT several times in the past to try to deal with this pain. Has seen multiple doctors and no one has really been able to figure out what the issue is. Has terrible groin pain that prevents her from walking a lot of the time. Got an injection in her psoas which was not helpful. Piriformis, hamstrings are tight. Has a hard time sitting longer periods. Walking is extremely painful.    Onset Timing/Date 2+ years ago; self referred for therapy   Mechanism Started with strained hamstring      Symptom Behavior Details   Primary Pain Symptoms Location: R) groin most intense; can be in buttock and down posterior leg  Quality: sharp, aching, shooting   Frequency: intermittent but some always present   Worst Pain 8/10   Best Pain 3/10   Symptom Provocators Walking, sitting, transitioning sit to stand, any rotation of hips   Symptom Relievers nothing   Time of day dependent? no   Symptom change since onset? Minimal change; maybe slightly better over past couple of months      Prior Testing/Intervention for current condition:  Prior Tests X-ray   Prior Treatment PT, steroid injections      Lifestyle & General Medical History  General Health Reported by Patient good   Employment/Activities Desk work   Pertinent medical/surgical history Overweight, B) knee OA   Patient Goals Reduce pain, walk more easily     Patient is a 58 year old female with lumbar and right side hip complaints.    Patient has the following significant findings with corresponding treatment plan.                Diagnosis 1:  R) hip pain  Pain -  manual therapy, self management, education and home program  Decreased ROM/flexibility - manual therapy and therapeutic exercise  Decreased joint  mobility - manual therapy and therapeutic exercise  Decreased strength - therapeutic exercise and therapeutic activities  Decreased proprioception - neuro re-education and therapeutic activities  Inflammation - self management/home program  Impaired muscle performance - neuro re-education  Decreased function - therapeutic activities    Therapy Evaluation Codes:   1) History comprised of:   Personal factors that impact the plan of care:      Time since onset of symptoms.    Comorbidity factors that impact the plan of care are:      Overweight.     Medications impacting care: None.  2) Examination of Body Systems comprised of:   Body structures and functions that impact the plan of care:      Hip and Lumbar spine.   Activity limitations that impact the plan of care are:      Bending, Dressing, Sitting, Squatting/kneeling, Stairs, Standing and Walking.  3) Clinical presentation characteristics are:   Evolving/Changing.  4) Decision-Making    Moderate complexity using standardized patient assessment instrument and/or measureable assessment of functional outcome.  Cumulative Therapy Evaluation is: Moderate complexity.    Previous and current functional limitations:  (See Goal Flow Sheet for this information)    Short term and Long term goals: (See Goal Flow Sheet for this information)     Communication ability:  Patient appears to be able to clearly communicate and understand verbal and written communication and follow directions correctly.  Treatment Explanation - The following has been discussed with the patient:   RX ordered/plan of care  Anticipated outcomes  Possible risks and side effects  This patient would benefit from PT intervention to resume normal activities.   Rehab potential is good.    Frequency:  2 X a month, once daily  Duration:  for 3 months  Discharge Plan:  Achieve all LTG.  Independent in home treatment program.  Reach maximal therapeutic benefit.    Please refer to the daily flowsheet for treatment  today, total treatment time and time spent performing 1:1 timed codes.

## 2019-02-27 PROBLEM — M25.551 HIP PAIN, RIGHT: Status: ACTIVE | Noted: 2017-08-16

## 2019-03-26 ENCOUNTER — THERAPY VISIT (OUTPATIENT)
Dept: PHYSICAL THERAPY | Facility: CLINIC | Age: 59
End: 2019-03-26
Payer: COMMERCIAL

## 2019-03-26 DIAGNOSIS — M25.551 HIP PAIN, RIGHT: ICD-10-CM

## 2019-03-26 PROCEDURE — 97110 THERAPEUTIC EXERCISES: CPT | Mod: GP | Performed by: PHYSICAL THERAPIST

## 2019-03-26 PROCEDURE — 97140 MANUAL THERAPY 1/> REGIONS: CPT | Mod: GP | Performed by: PHYSICAL THERAPIST

## 2019-05-03 ENCOUNTER — OFFICE VISIT (OUTPATIENT)
Dept: FAMILY MEDICINE | Facility: CLINIC | Age: 59
End: 2019-05-03

## 2019-05-03 VITALS
BODY MASS INDEX: 45.51 KG/M2 | HEART RATE: 61 BPM | HEIGHT: 66 IN | DIASTOLIC BLOOD PRESSURE: 80 MMHG | WEIGHT: 283.2 LBS | OXYGEN SATURATION: 99 % | TEMPERATURE: 98 F | SYSTOLIC BLOOD PRESSURE: 132 MMHG

## 2019-05-03 DIAGNOSIS — E88.810 METABOLIC SYNDROME: ICD-10-CM

## 2019-05-03 DIAGNOSIS — Z01.818 PREOP GENERAL PHYSICAL EXAM: Primary | ICD-10-CM

## 2019-05-03 DIAGNOSIS — K64.8 INTERNAL HEMORRHOIDS: ICD-10-CM

## 2019-05-03 DIAGNOSIS — G89.29 CHRONIC FOOT PAIN, LEFT: ICD-10-CM

## 2019-05-03 DIAGNOSIS — G43.009 MIGRAINE WITHOUT AURA AND WITHOUT STATUS MIGRAINOSUS, NOT INTRACTABLE: ICD-10-CM

## 2019-05-03 DIAGNOSIS — M25.551 HIP PAIN, RIGHT: ICD-10-CM

## 2019-05-03 DIAGNOSIS — R06.83 SNORING: ICD-10-CM

## 2019-05-03 DIAGNOSIS — E66.01 MORBID OBESITY (H): ICD-10-CM

## 2019-05-03 DIAGNOSIS — Z87.19 HISTORY OF GI BLEED: ICD-10-CM

## 2019-05-03 DIAGNOSIS — J45.991 COUGH VARIANT ASTHMA: ICD-10-CM

## 2019-05-03 DIAGNOSIS — E78.2 MIXED HYPERLIPIDEMIA: ICD-10-CM

## 2019-05-03 DIAGNOSIS — M79.672 CHRONIC FOOT PAIN, LEFT: ICD-10-CM

## 2019-05-03 PROBLEM — M25.561 CHRONIC PAIN OF RIGHT KNEE: Status: RESOLVED | Noted: 2017-01-09 | Resolved: 2019-05-03

## 2019-05-03 LAB — HEMOGLOBIN: 13.5 G/DL (ref 11.7–15.7)

## 2019-05-03 PROCEDURE — 99214 OFFICE O/P EST MOD 30 MIN: CPT | Performed by: PHYSICIAN ASSISTANT

## 2019-05-03 PROCEDURE — 85018 HEMOGLOBIN: CPT | Performed by: PHYSICIAN ASSISTANT

## 2019-05-03 PROCEDURE — 36415 COLL VENOUS BLD VENIPUNCTURE: CPT | Performed by: PHYSICIAN ASSISTANT

## 2019-05-03 ASSESSMENT — MIFFLIN-ST. JEOR: SCORE: 1873.4

## 2019-05-03 NOTE — PROGRESS NOTES
[facility]  [address] [place] [address]  [address]  [facility] [address]  [phone]  Dept: [phone]    PRE-OP EVALUATION:  Today's date: 5/3/2019    Rupal Puri (: 1960) presents for pre-operative evaluation assessment as requested by Dr. Durán.  She requires evaluation and anesthesia risk assessment prior to undergoing surgery/procedure for treatment of left foot repair .    Proposed Surgery/ Procedure: left foot repair  Date of Surgery/ Procedure: 19  Time of Surgery/ Procedure:   Hospital/Surgical Facility: [facility]  Primary Physician: Denia Salomon  Type of Anesthesia Anticipated: to be determined    Patient has a Health Care Directive or Living Will:  NO, pt does have one    1. NO - Do you have a history of heart attack, stroke, stent, bypass or surgery on an artery in the head, neck, heart or legs?  2. NO - Do you ever have any pain or discomfort in your chest?  3. NO - Do you have a history of  Heart Failure?  4. NO - Are you troubled by shortness of breath when: walking on the level, up a slight hill or at night?  5. NO - Do you currently have a cold, bronchitis or other respiratory infection?  6. NO - Do you have a cough, shortness of breath or wheezing?  7. NO - Do you sometimes get pains in the calves of your legs when you walk?  8. YES - Do you or anyone in your family have previous history of blood clots? Father - DVT and PE   9. NO - Do you or does anyone in your family have a serious bleeding problem such as prolonged bleeding following surgeries or cuts?  10. NO - Have you ever had problems with anemia or been told to take iron pills?  11. NO - Have you had any abnormal blood loss such as black, tarry or bloody stools, or abnormal vaginal bleeding?  12. NO - Have you ever had a blood transfusion?  13. NO - Have you or any of your relatives ever had problems with anesthesia?  14. NO - Do you have sleep apnea, excessive snoring or  daytime drowsiness?  15. NO - Do you have any prosthetic heart valves?  16. YES - Do you have prosthetic joints? Bilateral total knees  17. NO - Is there any chance that you may be pregnant?      HPI:     HPI related to upcoming procedure:  Foot surgery 18 months - due to torn ligament - pain continues      See problem list for active medical problems.  Problems all longstanding and stable, except as noted/documented.  See ROS for pertinent symptoms related to these conditions.                                                                                                                                                          .    MEDICAL HISTORY:     Patient Active Problem List    Diagnosis Date Noted     Morbid obesity (H) 09/07/2018     Priority: Medium     Migraine without aura and without status migrainosus, not intractable 01/05/2018     Priority: Medium     Hip pain, right 08/16/2017     Priority: Medium     Internal hemorrhoids - per 2017 colonoscopy 03/06/2017     Priority: Medium     Benign neoplasm of cecum 2017 colonoscopy - repeat 2022 03/06/2017     Priority: Medium     Shoulder pain 07/08/2016     Priority: Medium     History of GI bleed 2014 07/22/2014     Priority: Medium     Health Care Home 03/13/2013     Priority: Medium     State Tier Level:  Tier 2  Status:  NA  Care Coordinator:     See Letters for [facility] Care Plan           Cough variant asthma 04/16/2011     Priority: Medium     Mixed hyperlipidemia 12/22/2010     Priority: Medium     Metabolic syndrome 12/22/2010     Priority: Medium     Obesity, high TG's and low HDL, fasting glucose borderline       ACP (advance care planning) 01/14/2016     Priority: Low      Past Medical History:   Diagnosis Date     Abnormal cardiovascular stress test      Chest pain      Contact dermatitis and other eczema, due to unspecified cause      Gastric ulcer 2014     Migraine, unspecified, without mention of intractable migraine without mention of status  migrainosus      Mild intermittent asthma      Plantar fascial fibromatosis      Past Surgical History:   Procedure Laterality Date     ARTHROPLASTY KNEE  2013    Procedure: ARTHROPLASTY KNEE;  RIGHT TOTAL KNEE ARTHROPLASTY (IFEOMA)^;  Surgeon: Simon Pitts MD;  Location:  OR     ARTHROPLASTY KNEE  2014    Procedure: ARTHROPLASTY KNEE;  Surgeon: Simon Pitts MD;  Location:  OR     AS REPAIR FEMORAL HERNIA,REDUCIBLE  1991    hernia repair femoral     ESOPHAGOSCOPY, GASTROSCOPY, DUODENOSCOPY (EGD), COMBINED  2014    Procedure: COMBINED ESOPHAGOSCOPY, GASTROSCOPY, DUODENOSCOPY (EGD), BIOPSY SINGLE OR MULTIPLE;  Surgeon: Natalie Gibbons MD;  Location:  GI     PLACEMENT OF DENTAL IMPLANT(S)       TONSILLECTOMY & ADENOIDECTOMY  1966    age 6     Current Outpatient Medications   Medication Sig Dispense Refill     ACETAMINOPHEN PO        colchicine (COLCRYS) 0.6 MG tablet Take 2 tabs by mouth at the first sign of a gout attack, then 1 tab 1 hour later.  Max 3 tabs over 1 hour. Then 1 tablet twice daily. 30 tablet 0     rizatriptan (MAXALT-MLT) 10 MG ODT tab DISSOLVE 1 TABLET ON TONGUE AT ONSET OF HEADACHE. MAY REPEAT IN 2 HOURS. MAX 3 TABLETS PER DAY 18 tablet 3     OTC products: None, except as noted above    Allergies   Allergen Reactions     Ibuprofen GI Disturbance     2 days of gi upset after taking any NSAID      Latex Allergy: NO    Social History     Tobacco Use     Smoking status: Former Smoker     Packs/day: 1.00     Years: 5.00     Pack years: 5.00     Last attempt to quit: 3/20/1988     Years since quittin.1     Smokeless tobacco: Never Used   Substance Use Topics     Alcohol use: Yes     Alcohol/week: 1.2 oz     Types: 2 Standard drinks or equivalent per week     History   Drug Use No       REVIEW OF SYSTEMS:   Constitutional, neuro, ENT, endocrine, pulmonary, cardiac, gastrointestinal, genitourinary, musculoskeletal, integument and psychiatric systems are negative,  "except as otherwise noted.    EXAM:   /80 (BP Location: Right arm, Patient Position: Sitting, Cuff Size: Adult Large)   Pulse 61   Temp 98  F (36.7  C) (Oral)   Ht 1.664 m (5' 5.5\")   Wt 128.5 kg (283 lb 3.2 oz)   LMP 03/10/2013   SpO2 99%   BMI 46.41 kg/m         GENERAL APPEARANCE: healthy, alert and no distress     EYES: EOMI, PERRL     HENT: ear canals and TM's normal and nose and mouth without ulcers or lesions     NECK: no adenopathy, no asymmetry, masses, or scars and thyroid normal to palpation     RESP: lungs clear to auscultation - no rales, rhonchi or wheezes     CV: regular rates and rhythm, normal S1 S2, no S3 or S4 and no murmur, click or rub     ABDOMEN:  soft, nontender, no HSM or masses and bowel sounds normal     MS: extremities normal- no gross deformities noted, no evidence of inflammation in joints, FROM in all extremities.     SKIN: no suspicious lesions or rashes     NEURO: Normal strength and tone, sensory exam grossly normal, mentation intact and speech normal     PSYCH: mentation appears normal. and affect normal/bright     LYMPHATICS: No cervical adenopathy    DIAGNOSTICS:     Labs Resulted Today:   Results for orders placed or performed in visit on 05/03/19   CL AFF HEMOGLOBIN (BFP)   Result Value Ref Range    Hemoglobin 13.5 11.7 - 15.7 g/dL       Pt had CTA Angio in 2016:     Procedure: CTA ANGIOGRAM CORONARY ARTERY   Examination Date: 5/17/2016 12:25 PM   Indication: Abnormal stress test in the setting of left bundle branch  block.   Ordering Provider: TAMAR DE LA CRUZ  Overall quality of the study: Good.      PROCEDURE: After obtaining informed consent,  ECG gated multi-slice  computed tomography of the heart  with and without intravenous  contrast  (Isovue 370, 115 mL, wasted 0 mL) was  performed on a  Siemens Dual Source Flash scanner without incident. Beta-blockers were  used to optimize heart rate (Metoprolol 100 mg Oral/ Metoprolol 15 mg  IV). Sublingual Nitrostat " 0.4 mg was given prior to scanning. Coronary  artery calcium score was performed using the Flash scanner protocol.  CTA was performed in the spiral mode at a heart rate of 55 bpm with  120 kVp. Images were reconstructed and analyzed on a [employer]  workstation. Scan protocol was optimized to minimize radiation  exposure. The total radiation exposure including calcium score was  calculated to be 633 DLP, and 8.9 mSv.                                                                        IMPRESSION:  1.  Patent coronary arteries with minimal luminal irregularities.  2.  Total Agatston score 0 placing the patient in the lowest  percentile when compared to age and gender matched control group.  3.  Please review Radiology report for incidental noncardiac findings  that will follow separately.     FINDINGS:     CORONARY CALCIUM SCORE     Total Agatston calcium score: 0   Left main: 0  Left anterior descendin  Left circumflex: 0  Right coronary artery: 0   This places the patient in the lowest percentile when compared to age  and gender matched control group.     CORONARY ANGIOGRAPHY     DOMINANCE: Right dominant system.      LEFT MAIN:   The left main arises normally from the left coronary cusp and is  widely patent without any detectable stenosis.      LEFT ANTERIOR DESCENDING:   The left anterior descending and its major diagonal branches are  patent with minimal luminal irregularities     LEFT CIRCUMFLEX:   The left circumflex and its major marginal branches are patent with  minimal luminal irregularities     RIGHT CORONARY ARTERY:   The right coronary artery and the posterior descending artery are  patent with minimal luminal irregularities     ADDITIONAL FINDINGS:      The proximal ascending aorta is normal in size.   Normal pulmonary venous anatomy with all four pulmonary veins draining  into the left atrium.    There is no left ventricular mass or thrombus.   Normal pericardial thickness. There is no  pericardial effusion.  Please review Radiology report for incidental noncardiac findings that  will follow separately.     I have personally reviewed the examination and initial interpretation  and I agree with the findings.     FRANSICO BARTON MD    Recent Labs   Lab Test 09/07/18  1149 07/28/18  0044  04/30/16  1030  04/23/13  0602 04/17/13  1307   HGB 13.9 14.2   < > 13.6   < > 13.3  --     234  --  229   < > 214  --    INR  --   --   --   --   --  0.89  --    NA  --  141  --  141   < > 143  --    POTASSIUM  --  3.7  --  4.5   < > 4.4  --    CR  --  0.73  --  0.68   < > 0.78  --    A1C  --   --   --   --   --   --  5.9    < > = values in this interval not displayed.        IMPRESSION:   Reason for surgery/procedure: chronic left foot pain  Diagnosis/reason for consult:  Preoperative clearance      The proposed surgical procedure is considered INTERMEDIATE risk.    REVISED CARDIAC RISK INDEX  The patient has the following serious cardiovascular risks for perioperative complications such as (MI, PE, VFib and 3  AV Block):  No serious cardiac risks  INTERPRETATION: 0 risks: Class I (very low risk - 0.4% complication rate)    The patient has the following additional risks for perioperative complications:  No identified additional risks      ICD-10-CM    1. Preop general physical exam Z01.818 CL AFF HEMOGLOBIN (BFP)     VENOUS COLLECTION   2. Snoring R06.83 SLEEP EVALUATION & MANAGEMENT REFERRAL - ADULT [place] [place]  [phone] (Age 18 and up)   3. Chronic foot pain, left M79.672     G89.29    4. Cough variant asthma J45.991    5. Hip pain, right M25.551 ORTHOPEDICS ADULT REFERRAL   6. Internal hemorrhoids - per 2017 colonoscopy K64.8    7. History of GI bleed 2014 Z87.19    8. Metabolic syndrome E88.81    9. Migraine without aura and without status migrainosus, not intractable G43.009    10. Mixed hyperlipidemia E78.2    11. Morbid obesity (H) E66.01        RECOMMENDATIONS:      Caution for possible sleep apnea. Pt has never been diagnosed with this but does have snoring.  I have referred her for sleep study which does not need to be completed prior to surgery, but should be done within the next few months.     Hold all medications am of surgery.    APPROVAL GIVEN to proceed with proposed procedure, without further diagnostic evaluation       Signed Electronically by: Denia Salomon PA-C    Copy of this evaluation report is provided to requesting physician.    [facility] Preop Guidelines    Revised Cardiac Risk Index

## 2019-05-13 ENCOUNTER — ANESTHESIA EVENT (OUTPATIENT)
Dept: SURGERY | Facility: CLINIC | Age: 59
End: 2019-05-13
Payer: COMMERCIAL

## 2019-05-13 ENCOUNTER — HOSPITAL ENCOUNTER (OUTPATIENT)
Facility: CLINIC | Age: 59
Discharge: HOME | End: 2019-05-13
Attending: ORTHOPAEDIC SURGERY | Admitting: ORTHOPAEDIC SURGERY
Payer: COMMERCIAL

## 2019-05-13 ENCOUNTER — ANESTHESIA (OUTPATIENT)
Dept: SURGERY | Facility: CLINIC | Age: 59
End: 2019-05-13
Payer: COMMERCIAL

## 2019-05-13 VITALS
TEMPERATURE: 96.5 F | HEART RATE: 66 BPM | HEIGHT: 66 IN | DIASTOLIC BLOOD PRESSURE: 82 MMHG | WEIGHT: 284.9 LBS | OXYGEN SATURATION: 99 % | BODY MASS INDEX: 45.79 KG/M2 | SYSTOLIC BLOOD PRESSURE: 132 MMHG | RESPIRATION RATE: 16 BRPM

## 2019-05-13 DIAGNOSIS — Z98.890 S/P FOOT SURGERY: Primary | ICD-10-CM

## 2019-05-13 PROCEDURE — 25000132 ZZH RX MED GY IP 250 OP 250 PS 637: Performed by: ORTHOPAEDIC SURGERY

## 2019-05-13 PROCEDURE — 71000013 ZZH RECOVERY PHASE 1 LEVEL 1 EA ADDTL HR: Performed by: ORTHOPAEDIC SURGERY

## 2019-05-13 PROCEDURE — 40000170 ZZH STATISTIC PRE-PROCEDURE ASSESSMENT II: Performed by: ORTHOPAEDIC SURGERY

## 2019-05-13 PROCEDURE — 25000128 H RX IP 250 OP 636: Performed by: PHYSICIAN ASSISTANT

## 2019-05-13 PROCEDURE — 71000027 ZZH RECOVERY PHASE 2 EACH 15 MINS: Performed by: ORTHOPAEDIC SURGERY

## 2019-05-13 PROCEDURE — 37000008 ZZH ANESTHESIA TECHNICAL FEE, 1ST 30 MIN: Performed by: ORTHOPAEDIC SURGERY

## 2019-05-13 PROCEDURE — 71000012 ZZH RECOVERY PHASE 1 LEVEL 1 FIRST HR: Performed by: ORTHOPAEDIC SURGERY

## 2019-05-13 PROCEDURE — 36000063 ZZH SURGERY LEVEL 4 EA 15 ADDTL MIN: Performed by: ORTHOPAEDIC SURGERY

## 2019-05-13 PROCEDURE — 25000128 H RX IP 250 OP 636: Performed by: NURSE ANESTHETIST, CERTIFIED REGISTERED

## 2019-05-13 PROCEDURE — 37000009 ZZH ANESTHESIA TECHNICAL FEE, EACH ADDTL 15 MIN: Performed by: ORTHOPAEDIC SURGERY

## 2019-05-13 PROCEDURE — 25000566 ZZH SEVOFLURANE, EA 15 MIN: Performed by: ORTHOPAEDIC SURGERY

## 2019-05-13 PROCEDURE — 27110028 ZZH OR GENERAL SUPPLY NON-STERILE: Performed by: ORTHOPAEDIC SURGERY

## 2019-05-13 PROCEDURE — 25000125 ZZHC RX 250: Performed by: NURSE ANESTHETIST, CERTIFIED REGISTERED

## 2019-05-13 PROCEDURE — 27210794 ZZH OR GENERAL SUPPLY STERILE: Performed by: ORTHOPAEDIC SURGERY

## 2019-05-13 PROCEDURE — 36000065 ZZH SURGERY LEVEL 4 W FLUORO 1ST 30 MIN: Performed by: ORTHOPAEDIC SURGERY

## 2019-05-13 PROCEDURE — 25800030 ZZH RX IP 258 OP 636: Performed by: ANESTHESIOLOGY

## 2019-05-13 PROCEDURE — 25000128 H RX IP 250 OP 636: Performed by: ORTHOPAEDIC SURGERY

## 2019-05-13 RX ORDER — SODIUM CHLORIDE, SODIUM LACTATE, POTASSIUM CHLORIDE, CALCIUM CHLORIDE 600; 310; 30; 20 MG/100ML; MG/100ML; MG/100ML; MG/100ML
INJECTION, SOLUTION INTRAVENOUS CONTINUOUS
Status: DISCONTINUED | OUTPATIENT
Start: 2019-05-13 | End: 2019-05-13 | Stop reason: HOSPADM

## 2019-05-13 RX ORDER — ONDANSETRON 4 MG/1
4 TABLET, ORALLY DISINTEGRATING ORAL EVERY 30 MIN PRN
Status: DISCONTINUED | OUTPATIENT
Start: 2019-05-13 | End: 2019-05-13 | Stop reason: HOSPADM

## 2019-05-13 RX ORDER — PROPOFOL 10 MG/ML
INJECTION, EMULSION INTRAVENOUS PRN
Status: DISCONTINUED | OUTPATIENT
Start: 2019-05-13 | End: 2019-05-13

## 2019-05-13 RX ORDER — ONDANSETRON 4 MG/1
4-8 TABLET, ORALLY DISINTEGRATING ORAL EVERY 8 HOURS PRN
Qty: 12 TABLET | Refills: 1 | Status: SHIPPED | OUTPATIENT
Start: 2019-05-13 | End: 2019-08-29

## 2019-05-13 RX ORDER — ONDANSETRON 2 MG/ML
INJECTION INTRAMUSCULAR; INTRAVENOUS PRN
Status: DISCONTINUED | OUTPATIENT
Start: 2019-05-13 | End: 2019-05-13

## 2019-05-13 RX ORDER — DEXAMETHASONE SODIUM PHOSPHATE 4 MG/ML
INJECTION, SOLUTION INTRA-ARTICULAR; INTRALESIONAL; INTRAMUSCULAR; INTRAVENOUS; SOFT TISSUE PRN
Status: DISCONTINUED | OUTPATIENT
Start: 2019-05-13 | End: 2019-05-13

## 2019-05-13 RX ORDER — MEPERIDINE HYDROCHLORIDE 25 MG/ML
12.5 INJECTION INTRAMUSCULAR; INTRAVENOUS; SUBCUTANEOUS
Status: DISCONTINUED | OUTPATIENT
Start: 2019-05-13 | End: 2019-05-13 | Stop reason: HOSPADM

## 2019-05-13 RX ORDER — ONDANSETRON 4 MG/1
4 TABLET, ORALLY DISINTEGRATING ORAL
Status: DISCONTINUED | OUTPATIENT
Start: 2019-05-13 | End: 2019-05-13 | Stop reason: HOSPADM

## 2019-05-13 RX ORDER — CEFAZOLIN SODIUM IN 0.9 % NACL 3 G/100 ML
3 INTRAVENOUS SOLUTION, PIGGYBACK (ML) INTRAVENOUS
Status: COMPLETED | OUTPATIENT
Start: 2019-05-13 | End: 2019-05-13

## 2019-05-13 RX ORDER — AMOXICILLIN 250 MG
1-2 CAPSULE ORAL 2 TIMES DAILY
Qty: 30 TABLET | Refills: 0 | Status: SHIPPED | OUTPATIENT
Start: 2019-05-13 | End: 2019-08-29

## 2019-05-13 RX ORDER — HYDROMORPHONE HYDROCHLORIDE 1 MG/ML
.3-.5 INJECTION, SOLUTION INTRAMUSCULAR; INTRAVENOUS; SUBCUTANEOUS EVERY 10 MIN PRN
Status: DISCONTINUED | OUTPATIENT
Start: 2019-05-13 | End: 2019-05-13 | Stop reason: HOSPADM

## 2019-05-13 RX ORDER — HYDROCODONE BITARTRATE AND ACETAMINOPHEN 5; 325 MG/1; MG/1
2 TABLET ORAL
Status: DISCONTINUED | OUTPATIENT
Start: 2019-05-13 | End: 2019-05-13 | Stop reason: HOSPADM

## 2019-05-13 RX ORDER — NALOXONE HYDROCHLORIDE 0.4 MG/ML
.1-.4 INJECTION, SOLUTION INTRAMUSCULAR; INTRAVENOUS; SUBCUTANEOUS
Status: DISCONTINUED | OUTPATIENT
Start: 2019-05-13 | End: 2019-05-13 | Stop reason: HOSPADM

## 2019-05-13 RX ORDER — PROPOFOL 10 MG/ML
INJECTION, EMULSION INTRAVENOUS CONTINUOUS PRN
Status: DISCONTINUED | OUTPATIENT
Start: 2019-05-13 | End: 2019-05-13

## 2019-05-13 RX ORDER — FENTANYL CITRATE 50 UG/ML
25-50 INJECTION, SOLUTION INTRAMUSCULAR; INTRAVENOUS
Status: DISCONTINUED | OUTPATIENT
Start: 2019-05-13 | End: 2019-05-13 | Stop reason: HOSPADM

## 2019-05-13 RX ORDER — ONDANSETRON 2 MG/ML
4 INJECTION INTRAMUSCULAR; INTRAVENOUS EVERY 30 MIN PRN
Status: DISCONTINUED | OUTPATIENT
Start: 2019-05-13 | End: 2019-05-13 | Stop reason: HOSPADM

## 2019-05-13 RX ORDER — FENTANYL CITRATE 50 UG/ML
INJECTION, SOLUTION INTRAMUSCULAR; INTRAVENOUS PRN
Status: DISCONTINUED | OUTPATIENT
Start: 2019-05-13 | End: 2019-05-13

## 2019-05-13 RX ORDER — HYDROCODONE BITARTRATE AND ACETAMINOPHEN 5; 325 MG/1; MG/1
1-2 TABLET ORAL EVERY 4 HOURS PRN
Qty: 25 TABLET | Refills: 0 | Status: SHIPPED | OUTPATIENT
Start: 2019-05-13 | End: 2019-08-29

## 2019-05-13 RX ORDER — LIDOCAINE HYDROCHLORIDE 20 MG/ML
INJECTION, SOLUTION INFILTRATION; PERINEURAL PRN
Status: DISCONTINUED | OUTPATIENT
Start: 2019-05-13 | End: 2019-05-13

## 2019-05-13 RX ORDER — ROPIVACAINE HYDROCHLORIDE 5 MG/ML
INJECTION, SOLUTION EPIDURAL; INFILTRATION; PERINEURAL PRN
Status: DISCONTINUED | OUTPATIENT
Start: 2019-05-13 | End: 2019-05-13 | Stop reason: HOSPADM

## 2019-05-13 RX ORDER — ACETAMINOPHEN 500 MG
1000 TABLET ORAL ONCE
Status: COMPLETED | OUTPATIENT
Start: 2019-05-13 | End: 2019-05-13

## 2019-05-13 RX ORDER — CEFAZOLIN SODIUM 1 G/3ML
1 INJECTION, POWDER, FOR SOLUTION INTRAMUSCULAR; INTRAVENOUS SEE ADMIN INSTRUCTIONS
Status: DISCONTINUED | OUTPATIENT
Start: 2019-05-13 | End: 2019-05-13 | Stop reason: HOSPADM

## 2019-05-13 RX ADMIN — FENTANYL CITRATE 50 MCG: 50 INJECTION, SOLUTION INTRAMUSCULAR; INTRAVENOUS at 11:18

## 2019-05-13 RX ADMIN — MIDAZOLAM 2 MG: 1 INJECTION INTRAMUSCULAR; INTRAVENOUS at 11:14

## 2019-05-13 RX ADMIN — PROPOFOL 30 MCG/KG/MIN: 10 INJECTION, EMULSION INTRAVENOUS at 11:18

## 2019-05-13 RX ADMIN — FENTANYL CITRATE 50 MCG: 50 INJECTION, SOLUTION INTRAMUSCULAR; INTRAVENOUS at 11:30

## 2019-05-13 RX ADMIN — LIDOCAINE HYDROCHLORIDE 100 MG: 20 INJECTION, SOLUTION INFILTRATION; PERINEURAL at 11:18

## 2019-05-13 RX ADMIN — PROPOFOL 200 MG: 10 INJECTION, EMULSION INTRAVENOUS at 11:18

## 2019-05-13 RX ADMIN — Medication 3 G: at 11:23

## 2019-05-13 RX ADMIN — DEXAMETHASONE SODIUM PHOSPHATE 4 MG: 4 INJECTION, SOLUTION INTRA-ARTICULAR; INTRALESIONAL; INTRAMUSCULAR; INTRAVENOUS; SOFT TISSUE at 11:21

## 2019-05-13 RX ADMIN — ACETAMINOPHEN 1000 MG: 500 TABLET, FILM COATED ORAL at 12:40

## 2019-05-13 RX ADMIN — ONDANSETRON 4 MG: 2 INJECTION INTRAMUSCULAR; INTRAVENOUS at 11:45

## 2019-05-13 RX ADMIN — SODIUM CHLORIDE, POTASSIUM CHLORIDE, SODIUM LACTATE AND CALCIUM CHLORIDE: 600; 310; 30; 20 INJECTION, SOLUTION INTRAVENOUS at 11:14

## 2019-05-13 ASSESSMENT — LIFESTYLE VARIABLES: TOBACCO_USE: 1

## 2019-05-13 ASSESSMENT — MIFFLIN-ST. JEOR: SCORE: 1889.05

## 2019-05-13 NOTE — ADDENDUM NOTE
Addendum  created 05/13/19 1313 by Jocelyne De La Cruz APRN CRNA    Intraprocedure Staff edited

## 2019-05-13 NOTE — OR NURSING
Patient states she has crutches in car. Post op shoe fitted. Up to bathroom and urinated without difficulty.

## 2019-05-13 NOTE — DISCHARGE INSTRUCTIONS
Same Day Surgery Discharge Instructions for  Sedation and General Anesthesia       It's not unusual to feel dizzy, light-headed or faint for up to 24 hours after surgery or while taking pain medication.  If you have these symptoms: sit for a few minutes before standing and have someone assist you when you get up to walk or use the bathroom.      You should rest and relax for the next 24 hours. We recommend you make arrangements to have an adult stay with you for at least 24 hours after your discharge.  Avoid hazardous and strenuous activity.      DO NOT DRIVE any vehicle or operate mechanical equipment for 24 hours following the end of your surgery.  Even though you may feel normal, your reactions may be affected by the medication you have received.      Do not drink alcoholic beverages for 24 hours following surgery.       Slowly progress to your regular diet as you feel able. It's not unusual to feel nauseated and/or vomit after receiving anesthesia.  If you develop these symptoms, drink clear liquids (apple juice, ginger ale, broth, 7-up, etc. ) until you feel better.  If your nausea and vomiting persists for 24 hours, please notify your surgeon.        All narcotic pain medications, along with inactivity and anesthesia, can cause constipation. Drinking plenty of liquids and increasing fiber intake will help.      For any questions of a medical nature, call your surgeon.      Do not make important decisions for 24 hours.      If you had general anesthesia, you may have a sore throat for a couple of days related to the breathing tube used during surgery.  You may use Cepacol lozenges to help with this discomfort.  If it worsens or if you develop a fever, contact your surgeon.       If you feel your pain is not well managed with the pain medications prescribed by your surgeon, please contact your surgeon's office to let them know so they can address your concerns.     POST-OPERATIVE INSTRUCTIONS  FOOT AND ANKLE  SURGERY  ASHELY Durán M.D.  Jason Gómez P.A.-C    These precautions MUST be followed for the first 24 hours after surgery:    Upon discharge, go directly home.    You must make arrangements to have a responsible adult stay with you.    DO NOT DRINK ALCOHOLIC BEVERAGES    It is not unusual to feel lightheaded up to 24 hours after surgery or while taking pain medication.  If you feel lightheaded, sit for a few minutes before standing and have someone assisted you when you get up to walk or use the restroom.    Do not use any mechanical equipment.    Do not make any important or legal decisions for 24 hours or while on pain medications.    You may experience dry mouth, sore throat, or sleep disturbances from the anesthesia and medications used during surgery.  Generalized muscle aches can sometimes occur.  These usually disappear in 12-24 hours.    POST-OPERATIVE CARE GUIDELINES    The following are general guidelines about what to expect the first days/weeks after surgery.  They are not specific, and your recovery may be slightly different.    Blood clots are not common, but are emergencies.  If you have sudden onset pain in your calf or leg, or have sudden shortness of breath, go to the Emergency Department.      Elevation of your operative foot/ankle is key to reducing the swelling in the immediate post-operative phase (first 3-5 days).  When you are at rest, elevate your foot at or above the level of your heart.  When sitting, your foot should be elevated on a chair or stool; not hanging down.      You should plan to rest the day after surgery no matter how minor the procedure.  More complicated procedures will require more time to return to normal activity.      Foot and ankle surgery is painful in most cases.   It is not unusual for the pain to be worse a day or two after surgery than on the day of.  If your pain is more than 8/10 contact our office.  If you don t have pain, gradually decrease your pain  meds by substituting Tylenol.  Please don t use Advil/ibuprofen unless we order it for you.      You will be prescribed Percocet or Vicodin for pain, Vistaril for spasms/pain adjunct, Senokot for constipation.  If you have had trouble taking these meds before or experience nausea or vomiting after surgery from your medication, please advise the recovery room nurses.  If you are already at home, try drinking only clear liquids and/or call our office.  Start taking narcotic pain medication when you feel sensation in your lower extremity after a block.       All pain medications, along with inactivity can cause constipation.  Use the Senakot as directed, increase fluid intake to 1 quart per day and increase your dietary fiber.  (The  P  fruits - peaches, plums, pears, and prunes as well as anise/black licorice are recommended.)    It is not unusual to run a low-grade fever after surgery.  If your temperature is elevated above 102 , lasts longer than 24 hours, or is questionable in any way, contact our office.      Drainage from your cast/dressing is normal.  Reinforce your cast/dressing with 4x4 dressings and cover with an Ace wrap.  Wait until 24 hours after surgery to change your dressings; by this time most of your bleeding will have stopped.  If you have drainage through your dressings 2 days after surgery, contact our office.      You cast/dressing will be changed at your 2-week follow up appointment.  They should be kept clean and DRY.  If your cast/dressing is damaged before that, contact our office.      It is normal to experience some bruising in the toes after surgery and they may appear  dark  when your foot hangs down.  It is important to actively wiggle your toes for at least 5-10 minutes each hour UNLESS you had surgery on those toes.      Use ice on your foot/ankle over the dressing/cast for 20 minutes per hour, 10 or more times per day.  A large bag of frozen peas works well.      Bathing: take a tub or  sponge bath instead of a shower if possible during the first two weeks.  If you choose to shower, cover the dressing/cast with a waterproof covering, these may be ordered from www.seal-tight.com or are available at some pharmacies/medical supply stores.  Another option is XeroSox, which is the original vacuum sealed bandage and cast cover.  The cast cover has a vacuum seal and is absolutely waterproof.  9-690-897-5471 or www.xerosox.[employer] for more information.      Driving:  For surgery on the left foot/ankle, you may drive as soon as narcotic medications are stopped.  For surgery on the right foot/ankle, you may drive when you are out of a cast, off pain medications, and you feel secure with braking.      In general, listen to your foot/ankle.  If you have a sudden, dramatic increase in pain that does not resolve after an hour or so, something is wrong - call our office.  If you fall or bump your foot/ankle and have sudden pain that resolves, give it 24 hours before you call.      Many of your questions can be addressed at your 2-week follow-up appointment - please make a list of things to ask us as they come up during your recovery.      If you had a nerve block it is common to have numbness in your leg and foot for up to 30 hours after surgery.  If your leg or foot is still numb more than 30 hours after surgery, please call the office.      FUTURE DENTIST VISITS:  If you have had a total ankle arthroplasty please be advised you must be on antibiotics prior to ANY dental work.  Please call your dentist office ahead of time and make them aware of this as your dentist will be able to order the prescription.  If you have had any other surgery this is not a concern.    If you have any further questions or concerns, please call our office at [phone]      Today you were given 1000 mg of Tylenol at 12:30pm . The recommended daily maximum dose is 4000 mg.       **If you have questions or concerns about your  procedure,   call Dr. Durán at [phone]**

## 2019-05-13 NOTE — ANESTHESIA PREPROCEDURE EVALUATION
Anesthesia Pre-Procedure Evaluation    Patient: Rupal Puri   MRN: [id number] : 1960          Preoperative Diagnosis: LEFT FOOT METATARSALGIA    Procedure(s):  LEFT SECOND METATARSALPHALANGEAL CAPSULE REPAIR ; SECOND METATARSAL  OSTEOTOMY    Past Medical History:   Diagnosis Date     Abnormal cardiovascular stress test      Chest pain      Contact dermatitis and other eczema, due to unspecified cause      Gastric ulcer      Migraine, unspecified, without mention of intractable migraine without mention of status migrainosus      Mild intermittent asthma      Plantar fascial fibromatosis      Past Surgical History:   Procedure Laterality Date     ARTHROPLASTY KNEE  2013    Procedure: ARTHROPLASTY KNEE;  RIGHT TOTAL KNEE ARTHROPLASTY (IFEOMA)^;  Surgeon: Simon Pitts MD;  Location:  OR     ARTHROPLASTY KNEE  2014    Procedure: ARTHROPLASTY KNEE;  Surgeon: Simon Pitts MD;  Location:  OR     AS REPAIR FEMORAL HERNIA,REDUCIBLE  1991    hernia repair femoral     ESOPHAGOSCOPY, GASTROSCOPY, DUODENOSCOPY (EGD), COMBINED  2014    Procedure: COMBINED ESOPHAGOSCOPY, GASTROSCOPY, DUODENOSCOPY (EGD), BIOPSY SINGLE OR MULTIPLE;  Surgeon: Natalie Gibbons MD;  Location:  GI     PLACEMENT OF DENTAL IMPLANT(S)       TONSILLECTOMY & ADENOIDECTOMY  1966    age 6       Anesthesia Evaluation     .  Type: General    No history of anesthetic complications          ROS/MED HX    ENT/Pulmonary:     (+)PEDRITO risk factors snores loudly, hypertension, obese, tobacco use, Past use Intermittent asthma , . .    Neurologic:  - neg neurologic ROS   (+)migraines,     Cardiovascular:     (+) Dyslipidemia, hypertension----. : . . . :. . Previous cardiac testing date:results:date: results:ECG reviewed date:18 results:LBBB (stable from prior)Cath date: 16  results:No luminal abnormalities          METS/Exercise Tolerance:  1 - Eating, dressing   Hematologic:         Musculoskeletal:   (+)  "arthritis,  -       GI/Hepatic:  - neg GI/hepatic ROS       Renal/Genitourinary:  - ROS Renal section negative       Endo:     (+) Obesity (Class 3), Other Endocrine Disorder Metabolic syndrome.      Psychiatric:  - neg psychiatric ROS       Infectious Disease:  - neg infectious disease ROS       Malignancy:      - no malignancy   Other:                          Physical Exam  Normal systems: cardiovascular and pulmonary    Airway   Mallampati: II  TM distance: >3 FB  Neck ROM: full    Dental   (+) caps    Cardiovascular       Pulmonary             Lab Results   Component Value Date    WBC 6.8 09/07/2018    HGB 13.5 05/03/2019    HCT 43.2 09/07/2018     09/07/2018     07/28/2018    POTASSIUM 3.7 07/28/2018    CHLORIDE 107 07/28/2018    CO2 26 07/28/2018    BUN 25 07/28/2018    CR 0.73 07/28/2018     (H) 07/28/2018    GWENDOLYN 9.3 07/28/2018    ALBUMIN 3.8 07/28/2018    PROTTOTAL 7.2 07/28/2018    ALT 32 07/28/2018    AST 20 07/28/2018    ALKPHOS 61 07/28/2018    BILITOTAL 0.3 07/28/2018    LIPASE 247 07/28/2018    PTT 29 06/19/2006    INR 0.89 04/23/2013    TSH 1.28 05/17/2016       Preop Vitals  BP Readings from Last 3 Encounters:   05/13/19 130/60   05/03/19 132/80   02/21/19 122/72    Pulse Readings from Last 3 Encounters:   05/13/19 76   05/03/19 61   02/21/19 77      Resp Readings from Last 3 Encounters:   05/13/19 16   07/28/18 17   01/22/18 12    SpO2 Readings from Last 3 Encounters:   05/13/19 97%   05/03/19 99%   02/21/19 97%      Temp Readings from Last 1 Encounters:   05/13/19 36.6  C (97.9  F) (Temporal)    Ht Readings from Last 1 Encounters:   05/13/19 1.676 m (5' 6\")      Wt Readings from Last 1 Encounters:   05/13/19 129.2 kg (284 lb 14.4 oz)    Estimated body mass index is 45.98 kg/m  as calculated from the following:    Height as of this encounter: 1.676 m (5' 6\").    Weight as of this encounter: 129.2 kg (284 lb 14.4 oz).       Anesthesia Plan      History & Physical Review  History " and physical reviewed and following examination; no interval change.    ASA Status:  3 .    NPO Status:  > 8 hours    Plan for General and LMA with Intravenous and Propofol induction. Maintenance will be Balanced.    PONV prophylaxis:  Ondansetron (or other 5HT-3) and Dexamethasone or Solumedrol       Postoperative Care  Postoperative pain management:  IV analgesics, Oral pain medications and Multi-modal analgesia.      Consents  Anesthetic plan, risks, benefits and alternatives discussed with:  Patient..                 Dex Carver MD

## 2019-05-13 NOTE — ANESTHESIA CARE TRANSFER NOTE
Patient: Rupal Puri    Procedure(s):  LEFT SECOND METATARSALPHALANGEAL CAPSULE REPAIR ; SECOND AND THIRD METATARSAL  OSTEOTOMY    Diagnosis: LEFT FOOT METATARSALGIA  Diagnosis Additional Information: No value filed.    Anesthesia Type:   General, LMA     Note:  Airway :Face Mask  Patient transferred to:PACU  Comments: Pt exhibits spontaneous respirations, follows commands, suctioned, LMA removed, exchanging well, transferred to pacu with O2 @ 10L via mask, all monitors and alarms on, report to RN, VSS.Handoff Report: Identifed the Patient, Identified the Reponsible Provider, Reviewed the pertinent medical history, Discussed the surgical course, Reviewed Intra-OP anesthesia mangement and issues during anesthesia, Set expectations for post-procedure period and Allowed opportunity for questions and acknowledgement of understanding      Vitals: (Last set prior to Anesthesia Care Transfer)    CRNA VITALS  5/13/2019 1119 - 5/13/2019 1153      5/13/2019             Pulse:  63    Temp:  37  C (98.6  F)    SpO2:  98 %    Resp Rate (set):  10    EKG:  Bundle branch block                Electronically Signed By: BRANDY Dan CRNA  May 13, 2019  11:53 AM

## 2019-05-13 NOTE — ANESTHESIA POSTPROCEDURE EVALUATION
Patient: Rupal Puri    Procedure(s):  LEFT SECOND METATARSALPHALANGEAL CAPSULE REPAIR ; SECOND AND THIRD METATARSAL  OSTEOTOMY    Diagnosis:LEFT FOOT METATARSALGIA  Diagnosis Additional Information: No value filed.    Anesthesia Type:  General, LMA    Note:  Anesthesia Post Evaluation    Patient location during evaluation: PACU  Patient participation: Able to fully participate in evaluation  Level of consciousness: awake  Pain management: adequate  Airway patency: patent  Cardiovascular status: acceptable  Respiratory status: acceptable  Hydration status: acceptable  PONV: none             Last vitals:  Vitals:    05/13/19 0933 05/13/19 1153   BP: 130/60 118/81   Pulse: 76 66   Resp: 16 16   Temp: 36.6  C (97.9  F) 35.8  C (96.5  F)   SpO2: 97% 96%         Electronically Signed By: Dex Carver MD  May 13, 2019  11:57 AM

## 2019-05-13 NOTE — OP NOTE
Procedure Date: 05/13/2019      PREOPERATIVE DIAGNOSIS:  Significant overload of the left second and third metatarsals and metatarsophalangeal joints after previous second MTP joint capsular repair.      POSTOPERATIVE DIAGNOSES:  Significant overload of the left second and third metatarsals and metatarsophalangeal joints after previous second metatarsophalangeal joint capsular repair.      PROCEDURES:     1.  Shortening and dorsiflexion osteotomies of the left second and third metatarsals.     2.  dorsal and medial capsulotomy of the second metatarsophalangeal joint as well as Z lengthening of the extensor tendon.      ANESTHESIA:  General.      SURGEON:  Christina Durán MD.       ASSISTANT:  PAUL Diaz.      PREAMBLE:  Ms. Puri presented with significant overload of the second and third metatarsophalangeal joints.  She previously had a plantar plate repair done elsewhere through a plantar approach.  This unfortunately did not resolve her pain.  She continues to have significant overload.      Informed consent was obtained for the above-mentioned procedure.      Two grams of Ancef was given prior to surgery.  There is no need for postoperative antibiotic prophylaxis.      DESCRIPTION OF PROCEDURE:  After adequate induction of a general anesthetic, the patient was positioned supine on the operating table.  The left leg was sterilely prepped and free draped in the usual fashion.  Tourniquet around the thigh was inflated to 300 mmHg.        A longitudinal incision was made over the dorsum of the second and third metatarsals.  A Z lengthening was done of the extensor tendon to the second toe.  A dorsal and medial MTP joint capsulotomy was done to help reduce the dorsiflexion medial deviation contracture.  This was followed by a double oblique shortening osteotomy of the second metatarsal to shorten and elevate it.  Adequate apposition was obtained after removing 3 mm of bone.        Attention was moved  to the third metatarsal.  A double oblique shortening osteotomy was then done of the third metatarsal to shorten it by 1.5 mm.  This gave satisfactory balance of the forefoot.  The tourniquet was deflated.  Hemostasis obtained.  The wounds were closed in layers.  A sterile dressing and a light compressive bandage were applied.  She tolerated the procedure well and was taken to the recovery room in satisfactory condition.      She can ambulate weightbearing as tolerated.  Sutures will be removed in 3 weeks.         COLLINS GILLETTE MD             D: 2019   T: 2019   MT:       Name:     DOTTY HARP   MRN:      [id number]        Account:        [id number]   :      1960           Procedure Date: 2019      Document: [id number]       cc: Denia Salomon PA-C

## 2019-06-17 ENCOUNTER — HOSPITAL ENCOUNTER (INPATIENT)
Facility: CLINIC | Age: 59
Setting detail: SURGERY ADMIT
End: 2019-06-17
Attending: ORTHOPAEDIC SURGERY | Admitting: ORTHOPAEDIC SURGERY
Payer: COMMERCIAL

## 2019-07-03 DIAGNOSIS — Z00.00 HEALTH MAINTENANCE EXAMINATION: Primary | ICD-10-CM

## 2019-07-03 LAB — HEMOGLOBIN: 14.7 G/DL (ref 11.7–15.7)

## 2019-07-03 PROCEDURE — 85018 HEMOGLOBIN: CPT | Performed by: PHYSICIAN ASSISTANT

## 2019-07-03 PROCEDURE — 36415 COLL VENOUS BLD VENIPUNCTURE: CPT | Performed by: PHYSICIAN ASSISTANT

## 2019-07-08 ENCOUNTER — HOSPITAL ENCOUNTER (INPATIENT)
Facility: CLINIC | Age: 59
Setting detail: SURGERY ADMIT
End: 2019-07-08
Attending: ORTHOPAEDIC SURGERY | Admitting: ORTHOPAEDIC SURGERY
Payer: COMMERCIAL

## 2019-07-10 PROBLEM — M25.551 HIP PAIN, RIGHT: Status: RESOLVED | Noted: 2017-08-16 | Resolved: 2019-03-26

## 2019-07-10 NOTE — PROGRESS NOTES
Rupal Puri was seen 2 times for evaluation and treatment.  Patient did not return for further treatment and current status is unknown.  Due to short treatment duration, no objective or functional changes were made.  Please see goal flow sheet from episode noted date below and initial evaluation for further information.    Linked Episodes   Type: Episode: Status: Noted: Resolved: Last update: Updated by:   PHYSICAL THERAPY R) hip pain Active 2/27/2019  3/26/2019  3:30 PM Nicole Piedra, PT      Comments:

## 2019-08-22 ENCOUNTER — HOSPITAL ENCOUNTER (OUTPATIENT)
Dept: LAB | Facility: CLINIC | Age: 59
Discharge: HOME | End: 2019-08-22
Attending: ORTHOPAEDIC SURGERY | Admitting: ORTHOPAEDIC SURGERY
Payer: COMMERCIAL

## 2019-08-22 DIAGNOSIS — Z01.812 PRE-OPERATIVE LABORATORY EXAMINATION: ICD-10-CM

## 2019-08-22 LAB
MRSA DNA SPEC QL NAA+PROBE: NEGATIVE
SPECIMEN SOURCE: NORMAL

## 2019-08-22 PROCEDURE — 87640 STAPH A DNA AMP PROBE: CPT | Performed by: ORTHOPAEDIC SURGERY

## 2019-08-22 PROCEDURE — 40000829 ZZHCL STATISTIC STAPH AUREUS SUSCEPT SCREEN PCR: Performed by: ORTHOPAEDIC SURGERY

## 2019-08-22 PROCEDURE — 40000830 ZZHCL STATISTIC STAPH AUREUS METH RESIST SCREEN PCR: Performed by: ORTHOPAEDIC SURGERY

## 2019-08-22 PROCEDURE — 87641 MR-STAPH DNA AMP PROBE: CPT | Performed by: ORTHOPAEDIC SURGERY

## 2019-08-29 ENCOUNTER — OFFICE VISIT (OUTPATIENT)
Dept: FAMILY MEDICINE | Facility: CLINIC | Age: 59
End: 2019-08-29

## 2019-08-29 VITALS
OXYGEN SATURATION: 96 % | TEMPERATURE: 97.5 F | DIASTOLIC BLOOD PRESSURE: 78 MMHG | SYSTOLIC BLOOD PRESSURE: 128 MMHG | HEIGHT: 66 IN | HEART RATE: 83 BPM | WEIGHT: 274 LBS | BODY MASS INDEX: 44.03 KG/M2

## 2019-08-29 DIAGNOSIS — M16.51 POST-TRAUMATIC OSTEOARTHRITIS OF RIGHT HIP: ICD-10-CM

## 2019-08-29 DIAGNOSIS — Z01.818 PREOP GENERAL PHYSICAL EXAM: Primary | ICD-10-CM

## 2019-08-29 DIAGNOSIS — E66.01 MORBID OBESITY (H): ICD-10-CM

## 2019-08-29 LAB
ERYTHROCYTE [DISTWIDTH] IN BLOOD BY AUTOMATED COUNT: 12.5 %
HCT VFR BLD AUTO: 45.8 % (ref 35–47)
HEMOGLOBIN: 14.8 G/DL (ref 11.7–15.7)
MCH RBC QN AUTO: 30.1 PG (ref 26–33)
MCHC RBC AUTO-ENTMCNC: 32.3 G/DL (ref 31–36)
MCV RBC AUTO: 93.1 FL (ref 78–100)
PLATELET COUNT - QUEST: 219 10^9/L (ref 150–375)
RBC # BLD AUTO: 4.92 10*12/L (ref 3.8–5.2)
WBC # BLD AUTO: 6.9 10*9/L (ref 4–11)

## 2019-08-29 PROCEDURE — 85027 COMPLETE CBC AUTOMATED: CPT | Performed by: PHYSICIAN ASSISTANT

## 2019-08-29 PROCEDURE — 99214 OFFICE O/P EST MOD 30 MIN: CPT | Performed by: PHYSICIAN ASSISTANT

## 2019-08-29 PROCEDURE — 36415 COLL VENOUS BLD VENIPUNCTURE: CPT | Performed by: PHYSICIAN ASSISTANT

## 2019-08-29 PROCEDURE — 93000 ELECTROCARDIOGRAM COMPLETE: CPT | Performed by: PHYSICIAN ASSISTANT

## 2019-08-29 ASSESSMENT — MIFFLIN-ST. JEOR: SCORE: 1835.64

## 2019-08-29 NOTE — PROGRESS NOTES
[facility] PHYSICIANS  [address]  [address]  [facility] [address]  [phone]  Dept: [phone]    PRE-OP EVALUATION:  Today's date: 2019    Rupal Puri (: 1960) presents for pre-operative evaluation assessment as requested by Dr. PRINCE Nathan.  She requires evaluation and anesthesia risk assessment prior to undergoing surgery/procedure for treatment of right hip replacement.    Proposed Surgery/ Procedure: Right hip replacement  Date of Surgery/ Procedure: 19  Time of Surgery/ Procedure: 7 AM  Hospital/Surgical Facility: [facility]  Primary Physician: Denia Salomon  Type of Anesthesia Anticipated: to be determined    Patient has a Health Care Directive or Living Will:  YES     1. NO - Do you have a history of heart attack, stroke, stent, bypass or surgery on an artery in the head, neck, heart or legs?  2. NO - Do you ever have any pain or discomfort in your chest?  3. NO - Do you have a history of  Heart Failure?  4. NO - Are you troubled by shortness of breath when: walking on the level, up a slight hill or at night?  5. NO - Do you currently have a cold, bronchitis or other respiratory infection?  6. NO - Do you have a cough, shortness of breath or wheezing?  8. YES - DO YOU OR ANYONE IN YOUR FAMILY HAVE PREVIOUS HISTORY OF BLOOD CLOTS? Father had DVT that happened after a fall.   8. NO - Do you or anyone in your family have previous history of blood clots?  9. NO - Do you or does anyone in your family have a serious bleeding problem such as prolonged bleeding following surgeries or cuts?  10. NO - Have you ever had problems with anemia or been told to take iron pills?  11. NO - Have you had any abnormal blood loss such as black, tarry or bloody stools, or abnormal vaginal bleeding?  12. NO - Have you ever had a blood transfusion?  13. NO - Have you or any of your relatives ever had problems with anesthesia?  14. NO - Do you have sleep apnea,  excessive snoring or daytime drowsiness?  15. NO - Do you have any prosthetic heart valves?  16. YES - DO YOU HAVE PROSTHETIC JOINTS? Bilateral knee replacement.   17. NO - Is there any chance that you may be pregnant?      HPI:     HPI related to upcoming procedure: Rupal has been having worsening bilateral hip pain. Recent MRI showed bilateral labrum tear. Plan is to do right hip replacement, and then replace left hip at a later date.     See problem list for active medical problems.  Problems all longstanding and stable, except as noted/documented.  See ROS for pertinent symptoms related to these conditions.      MEDICAL HISTORY:     Patient Active Problem List    Diagnosis Date Noted     Morbid obesity (H) 09/07/2018     Priority: Medium     Migraine without aura and without status migrainosus, not intractable 01/05/2018     Priority: Medium     Internal hemorrhoids - per 2017 colonoscopy 03/06/2017     Priority: Medium     Benign neoplasm of cecum 2017 colonoscopy - repeat 2022 03/06/2017     Priority: Medium     Shoulder pain 07/08/2016     Priority: Medium     History of GI bleed 2014 07/22/2014     Priority: Medium     Health Care Home 03/13/2013     Priority: Medium     State Tier Level:  Tier 2  Status:  NA  Care Coordinator:     See Letters for [facility] Care Plan           Cough variant asthma 04/16/2011     Priority: Medium     Mixed hyperlipidemia 12/22/2010     Priority: Medium     Metabolic syndrome 12/22/2010     Priority: Medium     Obesity, high TG's and low HDL, fasting glucose borderline       ACP (advance care planning) 01/14/2016     Priority: Low      Past Medical History:   Diagnosis Date     Abnormal cardiovascular stress test      Chest pain      Contact dermatitis and other eczema, due to unspecified cause      Gastric ulcer 2014     Migraine, unspecified, without mention of intractable migraine without mention of status migrainosus      Mild intermittent asthma      Plantar fascial  fibromatosis      Past Surgical History:   Procedure Laterality Date     ARTHROPLASTY KNEE  2013    Procedure: ARTHROPLASTY KNEE;  RIGHT TOTAL KNEE ARTHROPLASTY (IFEOMA)^;  Surgeon: Simon Pitts MD;  Location:  OR     ARTHROPLASTY KNEE  2014    Procedure: ARTHROPLASTY KNEE;  Surgeon: Simon Pitts MD;  Location:  OR     AS REPAIR FEMORAL HERNIA,REDUCIBLE  1991    hernia repair femoral     ESOPHAGOSCOPY, GASTROSCOPY, DUODENOSCOPY (EGD), COMBINED  2014    Procedure: COMBINED ESOPHAGOSCOPY, GASTROSCOPY, DUODENOSCOPY (EGD), BIOPSY SINGLE OR MULTIPLE;  Surgeon: Natalie Gibbons MD;  Location:  GI     OSTEOTOMY FOOT Left 2019    Procedure: LEFT SECOND METATARSALPHALANGEAL CAPSULE REPAIR ; SECOND AND THIRD METATARSAL  OSTEOTOMY;  Surgeon: Christina Durán MD;  Location:  OR     PLACEMENT OF DENTAL IMPLANT(S)       TONSILLECTOMY & ADENOIDECTOMY  1966    age 6     Current Outpatient Medications   Medication Sig Dispense Refill     ACETAMINOPHEN PO        colchicine (COLCRYS) 0.6 MG tablet Take 2 tabs by mouth at the first sign of a gout attack, then 1 tab 1 hour later.  Max 3 tabs over 1 hour. Then 1 tablet twice daily. 30 tablet 0     rizatriptan (MAXALT-MLT) 10 MG ODT tab DISSOLVE 1 TABLET ON TONGUE AT ONSET OF HEADACHE. MAY REPEAT IN 2 HOURS. MAX 3 TABLETS PER DAY 18 tablet 3     OTC products: None, except as noted above    Allergies   Allergen Reactions     Ibuprofen GI Disturbance     2 days of gi upset after taking any NSAID      Latex Allergy: NO    Social History     Tobacco Use     Smoking status: Former Smoker     Packs/day: 1.00     Years: 5.00     Pack years: 5.00     Last attempt to quit: 3/20/1988     Years since quittin.4     Smokeless tobacco: Never Used   Substance Use Topics     Alcohol use: Yes     Alcohol/week: 1.2 oz     Types: 2 Standard drinks or equivalent per week     History   Drug Use No       REVIEW OF SYSTEMS:   Constitutional, neuro, ENT,  "endocrine, pulmonary, cardiac, gastrointestinal, genitourinary, musculoskeletal, integument and psychiatric systems are negative, except as otherwise noted.    EXAM:   /78 (BP Location: Left arm, Patient Position: Sitting, Cuff Size: Adult Large)   Pulse 83   Temp 97.5  F (36.4  C) (Oral)   Ht 1.67 m (5' 5.75\")   Wt 124.3 kg (274 lb)   LMP 03/10/2013   SpO2 96%   BMI 44.56 kg/m      GENERAL APPEARANCE: healthy, alert and no distress     EYES: EOMI, PERRL     HENT: ear canals and TM's normal and nose and mouth without ulcers or lesions     NECK: no adenopathy, no asymmetry, masses, or scars and thyroid normal to palpation     RESP: lungs clear to auscultation - no rales, rhonchi or wheezes     CV: regular rates and rhythm, normal S1 S2, no S3 or S4 and no murmur, click or rub     ABDOMEN:  soft, nontender, no HSM or masses and bowel sounds normal     MS: extremities normal- no gross deformities noted, no evidence of inflammation in joints, FROM in all extremities.     SKIN: no suspicious lesions or rashes     NEURO: Normal strength and tone, sensory exam grossly normal, mentation intact and speech normal     PSYCH: mentation appears normal. and affect normal/bright     LYMPHATICS: No cervical adenopathy    DIAGNOSTICS:   EKG (available in [facility], 8/29/2019): appears normal, NSR, normal axis, normal intervals, no acute ST/T changes c/w ischemia, no LVH by voltage criteria, Left Bundle Branch Block, unchanged from previous tracings  Hemoglobin (indicated for history of anemia or procedure with significant blood loss such as tonsillectomy, major intraperitoneal surgery, vascular surgery, major spine surgery, total joint replacement)    Office Visit on 08/29/2019   Component Date Value Ref Range Status     WBC 08/29/2019 6.9  4.0 - 11 10*9/L Final     RBC Count 08/29/2019 4.92  3.8 - 5.2 10*12/L Final     Hemoglobin 08/29/2019 14.8  11.7 - 15.7 g/dL Final     Hematocrit 08/29/2019 45.8  35.0 - 47.0 % Final     " MCV 08/29/2019 93.1  78 - 100 fL Final     MCH 08/29/2019 30.1  26 - 33 pg Final     MCHC 08/29/2019 32.3  31 - 36 g/dL Final     RDW 08/29/2019 12.5  % Final     Platelet Count 08/29/2019 219  150 - 375 10^9/L Final     No concerns with lab findings. MRSA screening done previously 8/22/2019 and negative.  IMPRESSION:   Reason for surgery/procedure: Right hip labral tear, osteoporosis.   Diagnosis/reason for consult: Pre-operative examination    The proposed surgical procedure is considered INTERMEDIATE risk.    REVISED CARDIAC RISK INDEX  The patient has the following serious cardiovascular risks for perioperative complications such as (MI, PE, VFib and 3  AV Block):  No serious cardiac risks  INTERPRETATION: 1 risks: Class II (low risk - 0.9% complication rate)    The patient has the following additional risks for perioperative complications:  No identified additional risks      ICD-10-CM    1. Preop general physical exam Z01.818 EKG 12-lead complete w/read - Clinics     HEMOGRAM/PLATELET (B[facility])     VENOUS COLLECTION   2. Morbid obesity (H) E66.01 EKG 12-lead complete w/read - Clinics     HEMOGRAM/PLATELET (BFP)     VENOUS COLLECTION   3. Post-traumatic osteoarthritis of right hip M16.51 EKG 12-lead complete w/read - Clinics     HEMOGRAM/PLATELET (BFP)     VENOUS COLLECTION       RECOMMENDATIONS:     --Patient is to take all scheduled medications on the day of surgery EXCEPT for modifications listed below.    APPROVAL GIVEN to proceed with proposed procedure, without further diagnostic evaluation    1. Seven days before surgery do not take Aspirin or any over-the-counter pain medications other than Tylenol.  TYLENOL is the safest pain pill to use before surgery because it does not affect your bleeding time. If tylenol is not sufficient for pain control talk to me or the surgeon and we will decide what is safe to use.    2. Do not eat anything after midnight  (kobi of the surgery) and nothing the morning of the  surgery.    3. Medications: Pt does not take daily medications.    4. Follow all instructions given by the surgery team. They usually give out a packet. Read it and please follow it precisely. This helps surgical experience and outcomes.    5. If you have any questions do not hesitate to call me or the surgeon/surgical team.      Aniyah Meyers PA-C  [facility]           Signed Electronically by: Aniyah Meyers PA-C    Copy of this evaluation report is provided to requesting physician.    [facility] Preop Guidelines    Revised Cardiac Risk Index

## 2019-09-13 ENCOUNTER — TELEPHONE (OUTPATIENT)
Dept: FAMILY MEDICINE | Facility: CLINIC | Age: 59
End: 2019-09-13

## 2019-09-13 NOTE — TELEPHONE ENCOUNTER
Pt called to inform that she has been having a bad gout flare, and was wondering if she should be seen, or what to do.   Chatted with Aniyah, advised pt be seen.  Pt is going out of town, and unable to make clinic on Saturday.  Pt states she will head to an urgent care now, and see if she can be treated.  Pt will call back tomorrow if she is not seen in urgent care.

## 2019-09-16 ENCOUNTER — OFFICE VISIT (OUTPATIENT)
Dept: FAMILY MEDICINE | Facility: CLINIC | Age: 59
End: 2019-09-16

## 2019-09-16 VITALS
SYSTOLIC BLOOD PRESSURE: 118 MMHG | DIASTOLIC BLOOD PRESSURE: 81 MMHG | HEART RATE: 82 BPM | OXYGEN SATURATION: 99 % | TEMPERATURE: 97.4 F | WEIGHT: 275 LBS | BODY MASS INDEX: 44.72 KG/M2

## 2019-09-16 DIAGNOSIS — M10.071 ACUTE IDIOPATHIC GOUT INVOLVING TOE OF RIGHT FOOT: Primary | ICD-10-CM

## 2019-09-16 LAB
% GRANULOCYTES: 69 %
HCT VFR BLD AUTO: 41.5 % (ref 35–47)
HEMOGLOBIN: 13.8 G/DL (ref 11.7–15.7)
LYMPHOCYTES NFR BLD AUTO: 22.7 %
MCH RBC QN AUTO: 31.2 PG (ref 26–33)
MCHC RBC AUTO-ENTMCNC: 33.3 G/DL (ref 31–36)
MCV RBC AUTO: 93.7 FL (ref 78–100)
MONOCYTES NFR BLD AUTO: 8.3 %
PLATELET COUNT - QUEST: 269 10^9/L (ref 150–375)
RBC # BLD AUTO: 4.43 10*12/L (ref 3.8–5.2)
WBC # BLD AUTO: 7.2 10*9/L (ref 4–11)

## 2019-09-16 PROCEDURE — 85025 COMPLETE CBC W/AUTO DIFF WBC: CPT | Performed by: PHYSICIAN ASSISTANT

## 2019-09-16 PROCEDURE — 36415 COLL VENOUS BLD VENIPUNCTURE: CPT | Performed by: PHYSICIAN ASSISTANT

## 2019-09-16 PROCEDURE — 99213 OFFICE O/P EST LOW 20 MIN: CPT | Performed by: PHYSICIAN ASSISTANT

## 2019-09-16 PROCEDURE — 84550 ASSAY OF BLOOD/URIC ACID: CPT | Mod: 90 | Performed by: PHYSICIAN ASSISTANT

## 2019-09-16 ASSESSMENT — ANXIETY QUESTIONNAIRES
1. FEELING NERVOUS, ANXIOUS, OR ON EDGE: NOT AT ALL
3. WORRYING TOO MUCH ABOUT DIFFERENT THINGS: NOT AT ALL
GAD7 TOTAL SCORE: 0
2. NOT BEING ABLE TO STOP OR CONTROL WORRYING: NOT AT ALL
5. BEING SO RESTLESS THAT IT IS HARD TO SIT STILL: NOT AT ALL
IF YOU CHECKED OFF ANY PROBLEMS ON THIS QUESTIONNAIRE, HOW DIFFICULT HAVE THESE PROBLEMS MADE IT FOR YOU TO DO YOUR WORK, TAKE CARE OF THINGS AT HOME, OR GET ALONG WITH OTHER PEOPLE: NOT DIFFICULT AT ALL
7. FEELING AFRAID AS IF SOMETHING AWFUL MIGHT HAPPEN: NOT AT ALL
6. BECOMING EASILY ANNOYED OR IRRITABLE: NOT AT ALL

## 2019-09-16 ASSESSMENT — PATIENT HEALTH QUESTIONNAIRE - PHQ9
SUM OF ALL RESPONSES TO PHQ QUESTIONS 1-9: 0
5. POOR APPETITE OR OVEREATING: NOT AT ALL

## 2019-09-16 NOTE — PROGRESS NOTES
Subjective     Rupal Puri is a 59 year old female who presents to clinic today for the following health issues:    HPI     Here with concerns about gout flare of podagra right  Started Colchine last week   Scheduled for hip replacement tomorrow- right    Denies fevers.      Patient Active Problem List   Diagnosis     Mixed hyperlipidemia     Metabolic syndrome     Cough variant asthma     Health Care Home     History of GI bleed      ACP (advance care planning)     Shoulder pain     Internal hemorrhoids - per  colonoscopy     Benign neoplasm of cecum  colonoscopy - repeat      Migraine without aura and without status migrainosus, not intractable     Morbid obesity (H)     Past Surgical History:   Procedure Laterality Date     ARTHROPLASTY KNEE  2013    Procedure: ARTHROPLASTY KNEE;  RIGHT TOTAL KNEE ARTHROPLASTY (IFEOMA)^;  Surgeon: Simon Pitts MD;  Location:  OR     ARTHROPLASTY KNEE  2014    Procedure: ARTHROPLASTY KNEE;  Surgeon: Simon Pitts MD;  Location:  OR     AS REPAIR FEMORAL HERNIA,REDUCIBLE  1991    hernia repair femoral     ESOPHAGOSCOPY, GASTROSCOPY, DUODENOSCOPY (EGD), COMBINED  2014    Procedure: COMBINED ESOPHAGOSCOPY, GASTROSCOPY, DUODENOSCOPY (EGD), BIOPSY SINGLE OR MULTIPLE;  Surgeon: Natalie Gibbons MD;  Location:  GI     OSTEOTOMY FOOT Left 2019    Procedure: LEFT SECOND METATARSALPHALANGEAL CAPSULE REPAIR ; SECOND AND THIRD METATARSAL  OSTEOTOMY;  Surgeon: Christina Durán MD;  Location:  OR     PLACEMENT OF DENTAL IMPLANT(S)       TONSILLECTOMY & ADENOIDECTOMY  1966    age 6       Social History     Tobacco Use     Smoking status: Former Smoker     Packs/day: 1.00     Years: 5.00     Pack years: 5.00     Last attempt to quit: 3/20/1988     Years since quittin.5     Smokeless tobacco: Never Used   Substance Use Topics     Alcohol use: Yes     Alcohol/week: 1.2 oz     Types: 2 Standard drinks or equivalent per week      Family History   Problem Relation Age of Onset     Osteoporosis Mother      Ovarian Cancer Mother 35     Hypertension Mother      Leukemia Mother      Cancer Father         kidney     C.A.D. Father         early disease     Gout Father      Macular Degeneration Brother      Depression Daughter      Anxiety Disorder Daughter      Neurologic Disorder Maternal Grandmother         TIA's     Diabetes Maternal Grandmother      Lipids Brother      Cancer - colorectal No family hx of      Breast Cancer No family hx of      Anesthesia Reaction No family hx of      Crohn's Disease No family hx of      Ulcerative Colitis No family hx of      Colon Polyps No family hx of            Current Outpatient Medications   Medication Sig Dispense Refill     acetaminophen (TYLENOL) 500 MG tablet Take 1,000 mg by mouth daily as needed (500MG X 2 = 1,000MG)       colchicine (COLCRYS) 0.6 MG tablet Take 2 tabs by mouth at the first sign of a gout attack, then 1 tab 1 hour later.  Max 3 tabs over 1 hour. Then 1 tablet twice daily. 30 tablet 0     rizatriptan (MAXALT-MLT) 10 MG ODT tab DISSOLVE 1 TABLET ON TONGUE AT ONSET OF HEADACHE. MAY REPEAT IN 2 HOURS. MAX 3 TABLETS PER DAY 18 tablet 3     Allergies   Allergen Reactions     Ibuprofen GI Disturbance     2 days of gi upset after taking any NSAID     Recent Labs   Lab Test 09/17/19  0623 07/28/18  0044 01/05/18  1034 05/17/16  1011 04/30/16  1030  04/17/13  1307   A1C  --   --   --   --   --   --  5.9   LDL  --   --  133* 112*  --   --   --    HDL  --   --  35* 38*  --   --   --    TRIG  --   --  217* 168*  --   --   --    ALT  --  32  --  29 46  --   --    CR 0.75 0.73  --   --  0.68   < >  --    GFRESTIMATED 87 82  --   --  89   < >  --    GFRESTBLACK >90 >90  --   --  >90  African American GFR Calc     < >  --    POTASSIUM  --  3.7  --   --  4.5   < >  --    TSH  --   --   --  1.28  --   --   --     < > = values in this interval not displayed.        BP Readings from Last 3  Encounters:   09/17/19 (!) 143/86   09/16/19 118/81   08/29/19 128/78    Wt Readings from Last 3 Encounters:   09/17/19 124.7 kg (275 lb)   09/16/19 124.7 kg (275 lb)   08/29/19 124.3 kg (274 lb)                        Review of Systems       ROS COMP: Constitutional, HEENT, cardiovascular, pulmonary, gi and gu systems are negative, except as otherwise noted.          Objective    /81 (BP Location: Right arm, Patient Position: Sitting, Cuff Size: Adult Large)   Pulse 82   Temp 97.4  F (36.3  C) (Oral)   Wt 124.7 kg (275 lb)   LMP 03/10/2013   SpO2 99%   BMI 44.72 kg/m    Body mass index is 44.72 kg/m .  Physical Exam     There is erythema and swelling of the right podagra at MTP joint with decreased ROM.  Moderate tenderness to palpation    Labs Resulted Today:   Results for orders placed or performed in visit on 09/16/19   HEMOGRAM/PLATE/DIFF   Result Value Ref Range    WBC 7.2 4.0 - 11 10*9/L    RBC Count 4.43 3.8 - 5.2 10*12/L    Hemoglobin 13.8 11.7 - 15.7 g/dL    Hematocrit 41.5 35.0 - 47.0 %    MCV 93.7 78 - 100 fL    MCH 31.2 26 - 33 pg    MCHC 33.3 31 - 36 g/dL    Platelet Count 269 150 - 375 10^9/L    % Granulocytes 69.0 %    % Lymphocytes 22.7 %    % Monocytes 8.3 %   URIC ACID ([facility])   Result Value Ref Range    Uric Acid 8.3 (H) 2.5 - 7.0 mg/dL                 Assessment & Plan     1. Acute idiopathic gout involving toe of right foot    I called surgeon - he advised against anything today to treat pain aside from APAP.  Proceed with surgery and they will treat gout after surgery.    - HEMOGRAM/PLATE/DIFF  - VENOUS COLLECTION  - URIC ACID ([facility])     Pt wants to start allopurinol.   Advised 100 mg daily after flare resolves then recheck uric acid 1 month in clinic lab only.           Denia Salomon, PA  [facility]

## 2019-09-16 NOTE — NURSING NOTE
Chief Complaint   Patient presents with     Arthritis     gout flare up, tried to take medication when felt it coming on, did not work so well, has surgery coming up and is not supposed to take anti inflammatory right now, right foot is where the flare up is, hard to walk, wants to take allopurinol after surgery     Pre-visit Screening:  Immunizations:  up to date  Colonoscopy:  is up to date  Mammogram: is up to date  Asthma Action Test/Plan:  ACT given today   PHQ9:  Given today   GAD7:  Given today   Questioned patient about current smoking habits Pt. quit smoking some time ago.  Ok to leave detailed message on voice mail for today's visit only Yes, phone # [phone]

## 2019-09-17 ENCOUNTER — ANESTHESIA EVENT (OUTPATIENT)
Dept: SURGERY | Facility: CLINIC | Age: 59
End: 2019-09-17
Payer: COMMERCIAL

## 2019-09-17 ENCOUNTER — HOSPITAL ENCOUNTER (OUTPATIENT)
Facility: CLINIC | Age: 59
Discharge: HOME | End: 2019-09-17
Attending: ORTHOPAEDIC SURGERY | Admitting: ORTHOPAEDIC SURGERY
Payer: COMMERCIAL

## 2019-09-17 ENCOUNTER — ANESTHESIA (OUTPATIENT)
Dept: SURGERY | Facility: CLINIC | Age: 59
End: 2019-09-17
Payer: COMMERCIAL

## 2019-09-17 VITALS
SYSTOLIC BLOOD PRESSURE: 143 MMHG | HEIGHT: 66 IN | WEIGHT: 275 LBS | RESPIRATION RATE: 18 BRPM | BODY MASS INDEX: 44.2 KG/M2 | DIASTOLIC BLOOD PRESSURE: 86 MMHG | OXYGEN SATURATION: 99 % | TEMPERATURE: 99.4 F

## 2019-09-17 LAB
ABO + RH BLD: NORMAL
ABO + RH BLD: NORMAL
BLD GP AB SCN SERPL QL: NORMAL
BLOOD BANK CMNT PATIENT-IMP: NORMAL
CREAT SERPL-MCNC: 0.75 MG/DL (ref 0.52–1.04)
GFR SERPL CREATININE-BSD FRML MDRD: 87 ML/MIN/{1.73_M2}
HGB BLD-MCNC: 13 G/DL (ref 11.7–15.7)
SPECIMEN EXP DATE BLD: NORMAL
URATE SERPL-MCNC: 8.3 MG/DL (ref 2.5–7)

## 2019-09-17 PROCEDURE — 86900 BLOOD TYPING SEROLOGIC ABO: CPT | Performed by: ANESTHESIOLOGY

## 2019-09-17 PROCEDURE — 36415 COLL VENOUS BLD VENIPUNCTURE: CPT | Performed by: ANESTHESIOLOGY

## 2019-09-17 PROCEDURE — 85018 HEMOGLOBIN: CPT | Performed by: PHYSICIAN ASSISTANT

## 2019-09-17 PROCEDURE — 86901 BLOOD TYPING SEROLOGIC RH(D): CPT | Performed by: ANESTHESIOLOGY

## 2019-09-17 PROCEDURE — 86850 RBC ANTIBODY SCREEN: CPT | Performed by: ANESTHESIOLOGY

## 2019-09-17 PROCEDURE — 82565 ASSAY OF CREATININE: CPT | Performed by: PHYSICIAN ASSISTANT

## 2019-09-17 RX ORDER — CEFAZOLIN SODIUM IN 0.9 % NACL 3 G/100 ML
3 INTRAVENOUS SOLUTION, PIGGYBACK (ML) INTRAVENOUS
Status: DISCONTINUED | OUTPATIENT
Start: 2019-09-17 | End: 2019-09-17 | Stop reason: HOSPADM

## 2019-09-17 RX ORDER — PREGABALIN 150 MG/1
150 CAPSULE ORAL ONCE
Status: DISCONTINUED | OUTPATIENT
Start: 2019-09-17 | End: 2019-09-17 | Stop reason: HOSPADM

## 2019-09-17 RX ORDER — ONDANSETRON 2 MG/ML
4 INJECTION INTRAMUSCULAR; INTRAVENOUS EVERY 30 MIN PRN
Status: CANCELLED | OUTPATIENT
Start: 2019-09-17

## 2019-09-17 RX ORDER — SODIUM CHLORIDE, SODIUM LACTATE, POTASSIUM CHLORIDE, CALCIUM CHLORIDE 600; 310; 30; 20 MG/100ML; MG/100ML; MG/100ML; MG/100ML
INJECTION, SOLUTION INTRAVENOUS CONTINUOUS
Status: DISCONTINUED | OUTPATIENT
Start: 2019-09-17 | End: 2019-09-17 | Stop reason: HOSPADM

## 2019-09-17 RX ORDER — CEFAZOLIN SODIUM 1 G/3ML
1 INJECTION, POWDER, FOR SOLUTION INTRAMUSCULAR; INTRAVENOUS SEE ADMIN INSTRUCTIONS
Status: DISCONTINUED | OUTPATIENT
Start: 2019-09-17 | End: 2019-09-17 | Stop reason: HOSPADM

## 2019-09-17 RX ORDER — SODIUM CHLORIDE, SODIUM LACTATE, POTASSIUM CHLORIDE, CALCIUM CHLORIDE 600; 310; 30; 20 MG/100ML; MG/100ML; MG/100ML; MG/100ML
INJECTION, SOLUTION INTRAVENOUS CONTINUOUS
Status: CANCELLED | OUTPATIENT
Start: 2019-09-17

## 2019-09-17 RX ORDER — METOPROLOL TARTRATE 1 MG/ML
1-2 INJECTION, SOLUTION INTRAVENOUS EVERY 5 MIN PRN
Status: CANCELLED | OUTPATIENT
Start: 2019-09-17

## 2019-09-17 RX ORDER — HYDROMORPHONE HYDROCHLORIDE 1 MG/ML
.3-.5 INJECTION, SOLUTION INTRAMUSCULAR; INTRAVENOUS; SUBCUTANEOUS EVERY 5 MIN PRN
Status: CANCELLED | OUTPATIENT
Start: 2019-09-17

## 2019-09-17 RX ORDER — FENTANYL CITRATE 50 UG/ML
25-50 INJECTION, SOLUTION INTRAMUSCULAR; INTRAVENOUS
Status: CANCELLED | OUTPATIENT
Start: 2019-09-17

## 2019-09-17 RX ORDER — NALOXONE HYDROCHLORIDE 0.4 MG/ML
.1-.4 INJECTION, SOLUTION INTRAMUSCULAR; INTRAVENOUS; SUBCUTANEOUS
Status: CANCELLED | OUTPATIENT
Start: 2019-09-17 | End: 2019-09-18

## 2019-09-17 RX ORDER — CELECOXIB 200 MG/1
400 CAPSULE ORAL ONCE
Status: DISCONTINUED | OUTPATIENT
Start: 2019-09-17 | End: 2019-09-17 | Stop reason: HOSPADM

## 2019-09-17 RX ORDER — ONDANSETRON 4 MG/1
4 TABLET, ORALLY DISINTEGRATING ORAL EVERY 30 MIN PRN
Status: CANCELLED | OUTPATIENT
Start: 2019-09-17

## 2019-09-17 RX ORDER — ACETAMINOPHEN 500 MG
1000 TABLET ORAL ONCE
Status: DISCONTINUED | OUTPATIENT
Start: 2019-09-17 | End: 2019-09-17 | Stop reason: HOSPADM

## 2019-09-17 ASSESSMENT — LIFESTYLE VARIABLES: TOBACCO_USE: 1

## 2019-09-17 ASSESSMENT — ASTHMA QUESTIONNAIRES: ACT_TOTALSCORE: 25

## 2019-09-17 ASSESSMENT — ANXIETY QUESTIONNAIRES: GAD7 TOTAL SCORE: 0

## 2019-09-17 ASSESSMENT — MIFFLIN-ST. JEOR: SCORE: 1844.14

## 2019-09-17 NOTE — PROGRESS NOTES
Admission medication history interview status for the 9/17/2019  admission is complete. See EPIC admission navigator for prior to admission medications     Medication history source reliability:Moderate    Medication history interview source(s):Patient    Medication history resources (including written lists, pill bottles, clinic record):None    Primary pharmacy.[facility]    Additional medication history information not noted on PTA med list :None    Time spent in this activity: 40 MINUTES    Prior to Admission medications    Medication Sig Last Dose Taking? Auth Provider   acetaminophen (TYLENOL) 500 MG tablet Take 1,000 mg by mouth daily as needed (500MG X 2 = 1,000MG) 9/16/2019 at PM Yes Reported, Patient   colchicine (COLCRYS) 0.6 MG tablet Take 2 tabs by mouth at the first sign of a gout attack, then 1 tab 1 hour later.  Max 3 tabs over 1 hour. Then 1 tablet twice daily. OVER 2 WEEKS AGO at PRN Yes Aniyah Meyers PA-C   rizatriptan (MAXALT-MLT) 10 MG ODT tab DISSOLVE 1 TABLET ON TONGUE AT ONSET OF HEADACHE. MAY REPEAT IN 2 HOURS. MAX 3 TABLETS PER DAY MORE THAN A MONTH at [facility] Yes Denia Salomon PA

## 2019-09-17 NOTE — PROGRESS NOTES
Dr Pitts cancelled surgery. Pt foot is tender  swollen and pink right foot. Temp 99.4  Pt admits she has since yesterdays she has increase pain in foot. Surgery cancelled.

## 2019-09-17 NOTE — ANESTHESIA PREPROCEDURE EVALUATION
Anesthesia Pre-Procedure Evaluation    Patient: Rupal Puri   MRN: [id number] : 1960          Preoperative Diagnosis: RIGHT HIP DJD    Procedure(s):  RIGHT TOTAL HIP ARTHROPLASTY DIRECT ANTERIOR APPROACH (DEPUY)^    Past Medical History:   Diagnosis Date     Abnormal cardiovascular stress test      Chest pain      Contact dermatitis and other eczema, due to unspecified cause      Gastric ulcer      Heart disease      Migraine, unspecified, without mention of intractable migraine without mention of status migrainosus      Mild intermittent asthma      Plantar fascial fibromatosis      Past Surgical History:   Procedure Laterality Date     ARTHROPLASTY KNEE  2013    Procedure: ARTHROPLASTY KNEE;  RIGHT TOTAL KNEE ARTHROPLASTY (IFEOMA)^;  Surgeon: Simon Pitts MD;  Location:  OR     ARTHROPLASTY KNEE  2014    Procedure: ARTHROPLASTY KNEE;  Surgeon: Simon Pitts MD;  Location:  OR     AS REPAIR FEMORAL HERNIA,REDUCIBLE  1991    hernia repair femoral     ESOPHAGOSCOPY, GASTROSCOPY, DUODENOSCOPY (EGD), COMBINED  2014    Procedure: COMBINED ESOPHAGOSCOPY, GASTROSCOPY, DUODENOSCOPY (EGD), BIOPSY SINGLE OR MULTIPLE;  Surgeon: Natalie Gibbons MD;  Location:  GI     OSTEOTOMY FOOT Left 2019    Procedure: LEFT SECOND METATARSALPHALANGEAL CAPSULE REPAIR ; SECOND AND THIRD METATARSAL  OSTEOTOMY;  Surgeon: Christina Durán MD;  Location:  OR     PLACEMENT OF DENTAL IMPLANT(S)       TONSILLECTOMY & ADENOIDECTOMY  1966    age 6     Allergies   Allergen Reactions     Ibuprofen GI Disturbance     2 days of gi upset after taking any NSAID     Social History     Tobacco Use     Smoking status: Former Smoker     Packs/day: 1.00     Years: 5.00     Pack years: 5.00     Last attempt to quit: 3/20/1988     Years since quittin.5     Smokeless tobacco: Never Used   Substance Use Topics     Alcohol use: Yes     Alcohol/week: 1.2 oz     Types: 2 Standard drinks or  equivalent per week     Prior to Admission medications    Medication Sig Start Date End Date Taking? Authorizing Provider   acetaminophen (TYLENOL) 500 MG tablet Take 1,000 mg by mouth daily as needed (500MG X 2 = 1,000MG)   Yes Reported, Patient   colchicine (COLCRYS) 0.6 MG tablet Take 2 tabs by mouth at the first sign of a gout attack, then 1 tab 1 hour later.  Max 3 tabs over 1 hour. Then 1 tablet twice daily. 9/7/18  Yes Aniyah Meyers PA-C   rizatriptan (MAXALT-MLT) 10 MG ODT tab DISSOLVE 1 TABLET ON TONGUE AT ONSET OF HEADACHE. MAY REPEAT IN 2 HOURS. MAX 3 TABLETS PER DAY 12/11/17  Yes Denia Salomon PA     Current Facility-Administered Medications Ordered in Epic   Medication Dose Route Frequency Last Rate Last Dose     acetaminophen (TYLENOL) tablet 1,000 mg  1,000 mg Oral Once         ceFAZolin (ANCEF) 1 g vial to attach to  ml bag for ADULT or 50 ml bag for PEDS  1 g Intravenous See Admin Instructions         ceFAZolin (ANCEF) intermittent infusion 3 g (pre-mix)  3 g Intravenous Pre-Op/Pre-procedure x 1 dose         celecoxib (celeBREX) capsule 400 mg  400 mg Oral Once         lactated ringers infusion   Intravenous Continuous         lidocaine 1 % 0.1-1 mL  0.1-1 mL Other Q1H PRN         pregabalin (LYRICA) capsule 150 mg  150 mg Oral Once         tranexamic acid (CYKLOKAPRON) 1 g in sodium chloride 0.9 % 60 mL bolus  1 g Intravenous Once         tranexamic acid (CYKLOKAPRON) 1 g in sodium chloride 0.9 % 60 mL bolus  1 g Intravenous Once         No current [facility]-ordered outpatient medications on file.       lactated ringers       Recent Labs   Lab Test 07/28/18  0044 01/05/18  1007 04/30/16  1030     --  141   POTASSIUM 3.7  --  4.5   CHLORIDE 107  --  111*   CO2 26  --  26   ANIONGAP 8  --  4   * 87 113*   BUN 25  --  17   CR 0.73  --  0.68   GWENDOLYN 9.3  --  8.8     Recent Labs   Lab Test 09/16/19  1315 08/29/19   WBC 7.2 6.9   HGB 13.8 14.8    219     Recent  Labs   Lab Test 04/23/13  0602   ABO A   RH  Pos     Recent Labs   Lab Test 07/28/18  0044 04/30/16  1030   TROPI <0.015 <0.015  The 99th percentile for upper reference range is 0.045 ug/L.  Troponin values in   the range of 0.045 - 0.120 ug/L may be associated with risks of adverse   clinical events.       No results for input(s): PH, PCO2, PO2, HCO3 in the last 04260 hours.  No results for input(s): HCG in the last 15225 hours.  No results found for this or any previous visit (from the past 744 hour(s)).    RECENT LABS:       Anesthesia Evaluation     .  Type: General    No history of anesthetic complications          ROS/MED HX    ENT/Pulmonary:     (+)PEDRITO risk factors snores loudly, hypertension, obese, tobacco use, Past use Intermittent asthma , . .    Neurologic:  - neg neurologic ROS   (+)migraines,     Cardiovascular:     (+) Dyslipidemia, hypertension----. : . . . :. . Previous cardiac testing date:results:Stress Testdate:5/11/2016 results:GATED MYOCARDIAL PERFUSION SCINTIGRAPHY WITH INTRAVENOUS PHARMACOLOGIC  VASODILATATION LEXISCAN -TWO DAY STUDY      5/12/2016 9:29 AM  DOTTY HARP  56 years  Female  1960.     Exams Performed on: Rest 5/11/2016 and Stress 5/12/2016     Indication/Clinical History: chest pain     Impression  1.  Myocardial perfusion imaging using single isotope technique  demonstrated inferior and inferoseptal myocardial ischemia.     Approximately 10% of the myocardium appears reversibly ischemic.  .   2. Gated images demonstrated normal LV systolic contraction.     .  The left ventricular  EF 54%.     3.No previous study was available.     Procedure  Pharmacologic stress testing was performed with Lexiscan at a rate of  0.08 mg/ml rapid bolus injection, for 15 seconds, 0.4 mg/5ml  intravenously. Low-level exercise was not performed along with the  vasodilator infusion.  The heart rate was 71 at baseline and ping to  93 beats per minute during the Lexiscan infusion. The rest  blood  pressure was 132 mmHg and was 145 mm Hg during Lexiscan infusion. The  patient experienced dyspnea  during the test.     Myocardial perfusion imaging was performed at rest, approximately 45  minutes after the injection intravenously of 33.0 mCi of Tc-99m  Myoview. At peak pharmacologic effect, 10-20 seconds after Lexiscan,   the patient was injected intravenously with 33.0 mCi of  Tc-99m  Myoview. The post-stress tomographic imaging was performed  approximately 60 minutes after stress.        EKG Findings  The resting EKG demonstrated sinus rhythm and LBBB. The stress EKG was  not interpretable.  Tomographic Findings  Overall, the study quality is poor due to extensive attenuation  artifact. . On the stress images, extensive anterior, apical, septal,  and inferior perfusion defects were seen.. On the rest images,the  inferior defects were partially reversible . Gated images demonstrated  normal LV systolic contraction.. The left ventricular ejection  fraction was calculated to be 54%. TID was not observed..ECG reviewed date:7/27/18 results:LBBB (stable from prior)Cath date: 5/17/16  results:No luminal abnormalities          METS/Exercise Tolerance:  1 - Eating, dressing   Hematologic:         Musculoskeletal:   (+) arthritis,  other musculoskeletal- Gout, acute attack right foot currently      GI/Hepatic:  - neg GI/hepatic ROS       Renal/Genitourinary:  - ROS Renal section negative       Endo:     (+) Obesity (Class 3), Other Endocrine Disorder Metabolic syndrome.      Psychiatric:  - neg psychiatric ROS       Infectious Disease:  - neg infectious disease ROS       Malignancy:      - no malignancy   Other:                          Physical Exam  Normal systems: cardiovascular and pulmonary    Airway   Mallampati: II  TM distance: >3 FB  Neck ROM: full    Dental   (+) caps    Cardiovascular       Pulmonary             Lab Results   Component Value Date    WBC 7.2 09/16/2019    HGB 13.8 09/16/2019    HCT 41.5  "09/16/2019     09/16/2019     07/28/2018    POTASSIUM 3.7 07/28/2018    CHLORIDE 107 07/28/2018    CO2 26 07/28/2018    BUN 25 07/28/2018    CR 0.73 07/28/2018     (H) 07/28/2018    GWENDOLYN 9.3 07/28/2018    ALBUMIN 3.8 07/28/2018    PROTTOTAL 7.2 07/28/2018    ALT 32 07/28/2018    AST 20 07/28/2018    ALKPHOS 61 07/28/2018    BILITOTAL 0.3 07/28/2018    LIPASE 247 07/28/2018    PTT 29 06/19/2006    INR 0.89 04/23/2013    TSH 1.28 05/17/2016       Preop Vitals  BP Readings from Last 3 Encounters:   09/17/19 (!) 143/86   09/16/19 118/81   08/29/19 128/78    Pulse Readings from Last 3 Encounters:   09/16/19 82   08/29/19 83   05/13/19 66      Resp Readings from Last 3 Encounters:   09/17/19 18   05/13/19 16   07/28/18 17    SpO2 Readings from Last 3 Encounters:   09/17/19 99%   09/16/19 99%   08/29/19 96%      Temp Readings from Last 1 Encounters:   09/17/19 37.2  C (99  F) (Temporal)    Ht Readings from Last 1 Encounters:   09/17/19 1.676 m (5' 6\")      Wt Readings from Last 1 Encounters:   09/17/19 124.7 kg (275 lb)    Estimated body mass index is 44.39 kg/m  as calculated from the following:    Height as of this encounter: 1.676 m (5' 6\").    Weight as of this encounter: 124.7 kg (275 lb).       Anesthesia Plan      History & Physical Review  History and physical reviewed and following examination; no interval change.    ASA Status:  3 .    NPO Status:  > 8 hours    Plan for General and ETT with Intravenous and Propofol induction. Maintenance will be Balanced.    PONV prophylaxis:  Ondansetron (or other 5HT-3) and Dexamethasone or Solumedrol  Additional equipment: Videolaryngoscope Hydromorphone  Propofol gtt for background      Postoperative Care  Postoperative pain management:  IV analgesics.      Consents  Anesthetic plan, risks, benefits and alternatives discussed with:  Patient..                 Sumit Cabrera [place], MD  "

## 2019-09-18 ENCOUNTER — TELEPHONE (OUTPATIENT)
Dept: FAMILY MEDICINE | Facility: CLINIC | Age: 59
End: 2019-09-18

## 2019-09-18 RX ORDER — ALLOPURINOL 100 MG/1
100 TABLET ORAL DAILY
Qty: 90 TABLET | Refills: 0 | Status: SHIPPED | OUTPATIENT
Start: 2019-09-18 | End: 2019-10-30 | Stop reason: SINTOL

## 2019-09-18 NOTE — TELEPHONE ENCOUNTER
I called the patient and informed her that Denia wanted her to be seen at the [facility] in [place] today so they can could do an ultrasound if needed. Patient was agreeable with the plan.

## 2019-09-18 NOTE — TELEPHONE ENCOUNTER
Patient called and said she was suppose to have hip surgery yesterday and they did not do it they post poned the surgery. Her right leg and right foot were red and swollen. She had gout three weeks ago and it didn't do anything after 7 days. She could not stand on it, it is warm and     indomethicin is what she is taking and not sure if it is helping      She said it is still swollen, pain, and hard to walk on it and it has been elevated all day. She is concerned about getting a blood clot.     Routing to [facility].   Patient Call back # [phone]

## 2019-10-04 ENCOUNTER — OFFICE VISIT (OUTPATIENT)
Dept: FAMILY MEDICINE | Facility: CLINIC | Age: 59
End: 2019-10-04

## 2019-10-04 VITALS
OXYGEN SATURATION: 97 % | WEIGHT: 273.2 LBS | DIASTOLIC BLOOD PRESSURE: 78 MMHG | BODY MASS INDEX: 43.91 KG/M2 | HEIGHT: 66 IN | SYSTOLIC BLOOD PRESSURE: 120 MMHG | HEART RATE: 82 BPM | TEMPERATURE: 98.4 F

## 2019-10-04 DIAGNOSIS — M10.9 ACUTE GOUTY ARTHRITIS: Primary | ICD-10-CM

## 2019-10-04 DIAGNOSIS — G43.009 MIGRAINE WITHOUT AURA AND WITHOUT STATUS MIGRAINOSUS, NOT INTRACTABLE: ICD-10-CM

## 2019-10-04 PROCEDURE — 99213 OFFICE O/P EST LOW 20 MIN: CPT | Performed by: PHYSICIAN ASSISTANT

## 2019-10-04 RX ORDER — RIZATRIPTAN BENZOATE 10 MG/1
TABLET, ORALLY DISINTEGRATING ORAL
Qty: 18 TABLET | Refills: 3 | Status: SHIPPED | OUTPATIENT
Start: 2019-10-04 | End: 2020-11-11

## 2019-10-04 RX ORDER — COLCHICINE 0.6 MG/1
0.6 TABLET ORAL DAILY
Qty: 30 TABLET | Refills: 0 | Status: SHIPPED | OUTPATIENT
Start: 2019-10-04 | End: 2019-10-21

## 2019-10-04 ASSESSMENT — MIFFLIN-ST. JEOR: SCORE: 1835.98

## 2019-10-04 NOTE — NURSING NOTE
Rupal is here today to discuss a refill of Maxalt and discuss her ongoing gout.    Pre-visit Screening:  Immunizations:  up to date  Colonoscopy:  is up to date  Mammogram: is up to date  Asthma Action Test/Plan:  NA  PHQ9:  NA  GAD7:  NA  Questioned patient about current smoking habits Pt. quit smoking some time ago.  Ok to leave detailed message on voice mail for today's visit only Yes, phone # [phone]

## 2019-10-04 NOTE — PROGRESS NOTES
Subjective     Chief Complaint   Patient presents with     Recheck Medication     non-fasting, refill of Maxalt     Arthritis     discuss options for Gout       Rupal Puri is a 59 year old female who presents to clinic today for the following health issues:    HPI       1. Right podagra pain has returned  Gout suspected  Elevated uric acid level  She would like suppressive tx  Father with gout.     Has several surgeries coming up      2. Migraines   Maxalt  Generic - dissolve tab    Initially had aura  No aura now  Pain desc as dull ache With vomiting  Sound sensitivity  Mother with migraine  Frequency  < 1 a month          Patient Active Problem List   Diagnosis     Mixed hyperlipidemia     Metabolic syndrome     Cough variant asthma     Health Care Home     History of GI bleed 2014     ACP (advance care planning)     Shoulder pain     Internal hemorrhoids - per 2017 colonoscopy     Benign neoplasm of cecum 2017 colonoscopy - repeat 2022     Migraine without aura and without status migrainosus, not intractable     Morbid obesity (H)     Past Surgical History:   Procedure Laterality Date     ARTHROPLASTY KNEE  4/23/2013    Procedure: ARTHROPLASTY KNEE;  RIGHT TOTAL KNEE ARTHROPLASTY (IFEOMA)^;  Surgeon: Simon Pitts MD;  Location:  OR     ARTHROPLASTY KNEE  7/17/2014    Procedure: ARTHROPLASTY KNEE;  Surgeon: Simon Pitst MD;  Location:  OR     AS REPAIR FEMORAL HERNIA,REDUCIBLE  1991    hernia repair femoral     ESOPHAGOSCOPY, GASTROSCOPY, DUODENOSCOPY (EGD), COMBINED  7/22/2014    Procedure: COMBINED ESOPHAGOSCOPY, GASTROSCOPY, DUODENOSCOPY (EGD), BIOPSY SINGLE OR MULTIPLE;  Surgeon: Natalie Gibbons MD;  Location:  GI     OSTEOTOMY FOOT Left 5/13/2019    Procedure: LEFT SECOND METATARSALPHALANGEAL CAPSULE REPAIR ; SECOND AND THIRD METATARSAL  OSTEOTOMY;  Surgeon: Christina Durán MD;  Location:  OR     PLACEMENT OF DENTAL IMPLANT(S)  2014     TONSILLECTOMY & ADENOIDECTOMY  1966     age 6       Social History     Tobacco Use     Smoking status: Former Smoker     Packs/day: 1.00     Years: 5.00     Pack years: 5.00     Last attempt to quit: 3/20/1988     Years since quittin.5     Smokeless tobacco: Never Used   Substance Use Topics     Alcohol use: Yes     Alcohol/week: 2.0 standard drinks     Types: 2 Standard drinks or equivalent per week     Family History   Problem Relation Age of Onset     Osteoporosis Mother      Ovarian Cancer Mother 35     Hypertension Mother      Leukemia Mother      Cancer Father         kidney     C.A.D. Father         early disease     Gout Father      Macular Degeneration Brother      Depression Daughter      Anxiety Disorder Daughter      Neurologic Disorder Maternal Grandmother         TIA's     Diabetes Maternal Grandmother      Lipids Brother      Cancer - colorectal No family hx of      Breast Cancer No family hx of      Anesthesia Reaction No family hx of      Crohn's Disease No family hx of      Ulcerative Colitis No family hx of      Colon Polyps No family hx of            Current Outpatient Medications   Medication Sig Dispense Refill     acetaminophen (TYLENOL) 500 MG tablet Take 1,000 mg by mouth daily as needed (500MG X 2 = 1,000MG)       colchicine (COLCRYS) 0.6 MG tablet Take 2 tabs by mouth at the first sign of a gout attack, then 1 tab 1 hour later.  Max 3 tabs over 1 hour. Then 1 tablet twice daily. 30 tablet 0     colchicine (COLCYRS) 0.6 MG tablet Take 1 tablet (0.6 mg) by mouth daily 30 tablet 0     rizatriptan (MAXALT-MLT) 10 MG ODT DISSOLVE 1 TABLET ON TONGUE AT ONSET OF HEADACHE. MAY REPEAT IN 2 HOURS. MAX 3 TABLETS PER DAY 18 tablet 3     allopurinol (ZYLOPRIM) 100 MG tablet Take 1 tablet (100 mg) by mouth daily 90 tablet 0     Allergies   Allergen Reactions     Ibuprofen GI Disturbance     2 days of gi upset after taking any NSAID     Indomethacin      Recent Labs   Lab Test 19  0623 18  0044 18  1034 16  1011  "04/30/16  1030  04/17/13  1307   A1C  --   --   --   --   --   --  5.9   LDL  --   --  133* 112*  --   --   --    HDL  --   --  35* 38*  --   --   --    TRIG  --   --  217* 168*  --   --   --    ALT  --  32  --  29 46  --   --    CR 0.75 0.73  --   --  0.68   < >  --    GFRESTIMATED 87 82  --   --  89   < >  --    GFRESTBLACK >90 >90  --   --  >90  African American GFR Calc     < >  --    POTASSIUM  --  3.7  --   --  4.5   < >  --    TSH  --   --   --  1.28  --   --   --     < > = values in this interval not displayed.        BP Readings from Last 3 Encounters:   10/04/19 120/78   09/17/19 (!) 143/86   09/16/19 118/81    Wt Readings from Last 3 Encounters:   10/04/19 123.9 kg (273 lb 3.2 oz)   09/17/19 124.7 kg (275 lb)   09/16/19 124.7 kg (275 lb)                      ROS COMP: Constitutional, HEENT, cardiovascular, pulmonary, gi and gu systems are negative, except as otherwise noted.                Objective    /78 (BP Location: Right arm, Patient Position: Sitting, Cuff Size: Adult Large)   Pulse 82   Temp 98.4  F (36.9  C) (Oral)   Ht 1.676 m (5' 6\")   Wt 123.9 kg (273 lb 3.2 oz)   [facility] 03/10/2013   SpO2 97%   BMI 44.10 kg/m    Body mass index is 44.1 kg/m .  Physical Exam     Musc: mild erythema and tenderness of right podagra with mild swelling.  Slight dec. Flexion/extension of the toe        Assessment & Plan     1. Acute gouty arthritis    Start colchicine 0.6 mg daily  10/17 start allopurionol  11/20 - hold colichine am of surgery/take allopurinol   Lab only 6 weeks to recheck uric acid  - colchicine (COLCYRS) 0.6 MG tablet; Take 1 tablet (0.6 mg) by mouth daily  Dispense: 30 tablet; Refill: 0    2. Migraine without aura and without status migrainosus, not intractable    - rizatriptan (MAXALT-MLT) 10 MG ODT; DISSOLVE 1 TABLET ON TONGUE AT ONSET OF HEADACHE. MAY REPEAT IN 2 HOURS. MAX 3 TABLETS PER DAY  Dispense: 18 tablet; Refill: 3     BMI:   Estimated body mass index is 44.39 kg/m  as " "calculated from the following:    Height as of 9/17/19: 1.676 m (5' 6\").    Weight as of 9/17/19: 124.7 kg (275 lb).       BMI:   Estimated body mass index is 44.1 kg/m  as calculated from the following:    Height as of this encounter: 1.676 m (5' 6\").    Weight as of this encounter: 123.9 kg (273 lb 3.2 oz).   Weight management plan: Discussed healthy diet and exercise guidelines                  Denia Salomon, PA  [facility]        "

## 2019-10-15 ENCOUNTER — APPOINTMENT (OUTPATIENT)
Dept: GENERAL RADIOLOGY | Facility: CLINIC | Age: 59
End: 2019-10-15
Attending: ORTHOPAEDIC SURGERY
Payer: COMMERCIAL

## 2019-10-15 ENCOUNTER — HOSPITAL ENCOUNTER (INPATIENT)
Facility: CLINIC | Age: 59
LOS: 1 days | Discharge: HOME | End: 2019-10-16
Attending: ORTHOPAEDIC SURGERY | Admitting: ORTHOPAEDIC SURGERY
Payer: COMMERCIAL

## 2019-10-15 ENCOUNTER — ANESTHESIA (OUTPATIENT)
Dept: SURGERY | Facility: CLINIC | Age: 59
End: 2019-10-15
Payer: COMMERCIAL

## 2019-10-15 ENCOUNTER — ANESTHESIA EVENT (OUTPATIENT)
Dept: SURGERY | Facility: CLINIC | Age: 59
End: 2019-10-15
Payer: COMMERCIAL

## 2019-10-15 DIAGNOSIS — Z96.641 STATUS POST RIGHT HIP REPLACEMENT: Primary | ICD-10-CM

## 2019-10-15 LAB
ABO + RH BLD: NORMAL
ABO + RH BLD: NORMAL
BLD GP AB SCN SERPL QL: NORMAL
BLOOD BANK CMNT PATIENT-IMP: NORMAL
CREAT SERPL-MCNC: 0.8 MG/DL (ref 0.52–1.04)
GFR SERPL CREATININE-BSD FRML MDRD: 81 ML/MIN/{1.73_M2}
HGB BLD-MCNC: 13.5 G/DL (ref 11.7–15.7)
SPECIMEN EXP DATE BLD: NORMAL
URATE SERPL-MCNC: 7.6 MG/DL (ref 2.6–6)

## 2019-10-15 PROCEDURE — 25000125 ZZHC RX 250: Performed by: NURSE ANESTHETIST, CERTIFIED REGISTERED

## 2019-10-15 PROCEDURE — 25800030 ZZH RX IP 258 OP 636: Performed by: ORTHOPAEDIC SURGERY

## 2019-10-15 PROCEDURE — 36000063 ZZH SURGERY LEVEL 4 EA 15 ADDTL MIN: Performed by: ORTHOPAEDIC SURGERY

## 2019-10-15 PROCEDURE — C1776 JOINT DEVICE (IMPLANTABLE): HCPCS | Performed by: ORTHOPAEDIC SURGERY

## 2019-10-15 PROCEDURE — 71000013 ZZH RECOVERY PHASE 1 LEVEL 1 EA ADDTL HR: Performed by: ORTHOPAEDIC SURGERY

## 2019-10-15 PROCEDURE — 25000128 H RX IP 250 OP 636: Performed by: PHYSICIAN ASSISTANT

## 2019-10-15 PROCEDURE — 25000132 ZZH RX MED GY IP 250 OP 250 PS 637: Performed by: ORTHOPAEDIC SURGERY

## 2019-10-15 PROCEDURE — 40000277 XR SURGERY CARM FLUORO LESS THAN 5 MIN W STILLS

## 2019-10-15 PROCEDURE — 82565 ASSAY OF CREATININE: CPT | Performed by: PHYSICIAN ASSISTANT

## 2019-10-15 PROCEDURE — 71000012 ZZH RECOVERY PHASE 1 LEVEL 1 FIRST HR: Performed by: ORTHOPAEDIC SURGERY

## 2019-10-15 PROCEDURE — 25800030 ZZH RX IP 258 OP 636: Performed by: ANESTHESIOLOGY

## 2019-10-15 PROCEDURE — 40000170 ZZH STATISTIC PRE-PROCEDURE ASSESSMENT II: Performed by: ORTHOPAEDIC SURGERY

## 2019-10-15 PROCEDURE — 36415 COLL VENOUS BLD VENIPUNCTURE: CPT | Performed by: ORTHOPAEDIC SURGERY

## 2019-10-15 PROCEDURE — C1713 ANCHOR/SCREW BN/BN,TIS/BN: HCPCS | Performed by: ORTHOPAEDIC SURGERY

## 2019-10-15 PROCEDURE — 25000566 ZZH SEVOFLURANE, EA 15 MIN: Performed by: ORTHOPAEDIC SURGERY

## 2019-10-15 PROCEDURE — 25000128 H RX IP 250 OP 636: Performed by: ORTHOPAEDIC SURGERY

## 2019-10-15 PROCEDURE — 27210794 ZZH OR GENERAL SUPPLY STERILE: Performed by: ORTHOPAEDIC SURGERY

## 2019-10-15 PROCEDURE — 25000125 ZZHC RX 250: Performed by: ORTHOPAEDIC SURGERY

## 2019-10-15 PROCEDURE — 84550 ASSAY OF BLOOD/URIC ACID: CPT | Performed by: ORTHOPAEDIC SURGERY

## 2019-10-15 PROCEDURE — 37000009 ZZH ANESTHESIA TECHNICAL FEE, EACH ADDTL 15 MIN: Performed by: ORTHOPAEDIC SURGERY

## 2019-10-15 PROCEDURE — 25000132 ZZH RX MED GY IP 250 OP 250 PS 637: Performed by: PHYSICIAN ASSISTANT

## 2019-10-15 PROCEDURE — 25800025 ZZH RX 258: Performed by: ORTHOPAEDIC SURGERY

## 2019-10-15 PROCEDURE — 40000985 XR PELVIS AD HIP PORTABLE RIGHT 1 VIEW

## 2019-10-15 PROCEDURE — 25800030 ZZH RX IP 258 OP 636: Performed by: PHYSICIAN ASSISTANT

## 2019-10-15 PROCEDURE — 25800030 ZZH RX IP 258 OP 636: Performed by: NURSE ANESTHETIST, CERTIFIED REGISTERED

## 2019-10-15 PROCEDURE — 86850 RBC ANTIBODY SCREEN: CPT | Performed by: ANESTHESIOLOGY

## 2019-10-15 PROCEDURE — 36415 COLL VENOUS BLD VENIPUNCTURE: CPT | Performed by: PHYSICIAN ASSISTANT

## 2019-10-15 PROCEDURE — 0SR903A REPLACEMENT OF RIGHT HIP JOINT WITH CERAMIC SYNTHETIC SUBSTITUTE, UNCEMENTED, OPEN APPROACH: ICD-10-PCS | Performed by: ORTHOPAEDIC SURGERY

## 2019-10-15 PROCEDURE — 25000128 H RX IP 250 OP 636: Performed by: SURGERY

## 2019-10-15 PROCEDURE — 25000128 H RX IP 250 OP 636: Performed by: NURSE ANESTHETIST, CERTIFIED REGISTERED

## 2019-10-15 PROCEDURE — 86900 BLOOD TYPING SEROLOGIC ABO: CPT | Performed by: ANESTHESIOLOGY

## 2019-10-15 PROCEDURE — 12000000 ZZH R&B MED SURG/OB

## 2019-10-15 PROCEDURE — 36000065 ZZH SURGERY LEVEL 4 W FLUORO 1ST 30 MIN: Performed by: ORTHOPAEDIC SURGERY

## 2019-10-15 PROCEDURE — 85018 HEMOGLOBIN: CPT | Performed by: PHYSICIAN ASSISTANT

## 2019-10-15 PROCEDURE — 86901 BLOOD TYPING SEROLOGIC RH(D): CPT | Performed by: ANESTHESIOLOGY

## 2019-10-15 PROCEDURE — 25000125 ZZHC RX 250: Performed by: PHYSICIAN ASSISTANT

## 2019-10-15 PROCEDURE — 37000008 ZZH ANESTHESIA TECHNICAL FEE, 1ST 30 MIN: Performed by: ORTHOPAEDIC SURGERY

## 2019-10-15 DEVICE — IMPLANTABLE DEVICE: Type: IMPLANTABLE DEVICE | Site: HIP | Status: FUNCTIONAL

## 2019-10-15 DEVICE — IMP HEAD FEMORAL DEPUY CERAMIC 36MM +5MM 136536320: Type: IMPLANTABLE DEVICE | Site: HIP | Status: FUNCTIONAL

## 2019-10-15 DEVICE — IMP APEX HOLE ELIMINATOR HIP DEPUY DURALOC 1246-03-000: Type: IMPLANTABLE DEVICE | Site: HIP | Status: FUNCTIONAL

## 2019-10-15 DEVICE — IMP SCR BONE CAN ACE 6.5X35MM 1217-35-500: Type: IMPLANTABLE DEVICE | Site: HIP | Status: FUNCTIONAL

## 2019-10-15 DEVICE — IMP CUP ACE PINNACLE 56MM 1217-22-056: Type: IMPLANTABLE DEVICE | Site: HIP | Status: FUNCTIONAL

## 2019-10-15 DEVICE — IMP SCR BONE CAN ACE 6.5X25MM 1217-25-500: Type: IMPLANTABLE DEVICE | Site: HIP | Status: FUNCTIONAL

## 2019-10-15 RX ORDER — NALOXONE HYDROCHLORIDE 0.4 MG/ML
.1-.4 INJECTION, SOLUTION INTRAMUSCULAR; INTRAVENOUS; SUBCUTANEOUS
Status: DISCONTINUED | OUTPATIENT
Start: 2019-10-15 | End: 2019-10-15

## 2019-10-15 RX ORDER — HYDROXYZINE HYDROCHLORIDE 25 MG/1
25 TABLET, FILM COATED ORAL EVERY 6 HOURS PRN
Status: DISCONTINUED | OUTPATIENT
Start: 2019-10-15 | End: 2019-10-16 | Stop reason: HOSPADM

## 2019-10-15 RX ORDER — TEMAZEPAM 7.5 MG/1
7.5 CAPSULE ORAL
Status: DISCONTINUED | OUTPATIENT
Start: 2019-10-16 | End: 2019-10-16 | Stop reason: HOSPADM

## 2019-10-15 RX ORDER — LIDOCAINE 40 MG/G
CREAM TOPICAL
Status: DISCONTINUED | OUTPATIENT
Start: 2019-10-15 | End: 2019-10-16 | Stop reason: HOSPADM

## 2019-10-15 RX ORDER — DEXAMETHASONE SODIUM PHOSPHATE 4 MG/ML
INJECTION, SOLUTION INTRA-ARTICULAR; INTRALESIONAL; INTRAMUSCULAR; INTRAVENOUS; SOFT TISSUE PRN
Status: DISCONTINUED | OUTPATIENT
Start: 2019-10-15 | End: 2019-10-15

## 2019-10-15 RX ORDER — AMOXICILLIN 250 MG
1 CAPSULE ORAL 2 TIMES DAILY
Status: DISCONTINUED | OUTPATIENT
Start: 2019-10-15 | End: 2019-10-16 | Stop reason: HOSPADM

## 2019-10-15 RX ORDER — PROPOFOL 10 MG/ML
INJECTION, EMULSION INTRAVENOUS CONTINUOUS PRN
Status: DISCONTINUED | OUTPATIENT
Start: 2019-10-15 | End: 2019-10-15

## 2019-10-15 RX ORDER — NEOSTIGMINE METHYLSULFATE 1 MG/ML
VIAL (ML) INJECTION PRN
Status: DISCONTINUED | OUTPATIENT
Start: 2019-10-15 | End: 2019-10-15

## 2019-10-15 RX ORDER — SODIUM CHLORIDE, SODIUM LACTATE, POTASSIUM CHLORIDE, CALCIUM CHLORIDE 600; 310; 30; 20 MG/100ML; MG/100ML; MG/100ML; MG/100ML
INJECTION, SOLUTION INTRAVENOUS CONTINUOUS
Status: DISCONTINUED | OUTPATIENT
Start: 2019-10-15 | End: 2019-10-15 | Stop reason: HOSPADM

## 2019-10-15 RX ORDER — FENTANYL CITRATE 50 UG/ML
25-50 INJECTION, SOLUTION INTRAMUSCULAR; INTRAVENOUS
Status: DISCONTINUED | OUTPATIENT
Start: 2019-10-15 | End: 2019-10-15 | Stop reason: HOSPADM

## 2019-10-15 RX ORDER — PROPOFOL 10 MG/ML
INJECTION, EMULSION INTRAVENOUS PRN
Status: DISCONTINUED | OUTPATIENT
Start: 2019-10-15 | End: 2019-10-15

## 2019-10-15 RX ORDER — CEFAZOLIN SODIUM 1 G/3ML
1 INJECTION, POWDER, FOR SOLUTION INTRAMUSCULAR; INTRAVENOUS SEE ADMIN INSTRUCTIONS
Status: DISCONTINUED | OUTPATIENT
Start: 2019-10-15 | End: 2019-10-15 | Stop reason: HOSPADM

## 2019-10-15 RX ORDER — METOPROLOL TARTRATE 1 MG/ML
1-2 INJECTION, SOLUTION INTRAVENOUS EVERY 5 MIN PRN
Status: DISCONTINUED | OUTPATIENT
Start: 2019-10-15 | End: 2019-10-15 | Stop reason: HOSPADM

## 2019-10-15 RX ORDER — ONDANSETRON 2 MG/ML
4 INJECTION INTRAMUSCULAR; INTRAVENOUS EVERY 30 MIN PRN
Status: DISCONTINUED | OUTPATIENT
Start: 2019-10-15 | End: 2019-10-15 | Stop reason: HOSPADM

## 2019-10-15 RX ORDER — AMOXICILLIN 250 MG
2 CAPSULE ORAL 2 TIMES DAILY
Status: DISCONTINUED | OUTPATIENT
Start: 2019-10-15 | End: 2019-10-16 | Stop reason: HOSPADM

## 2019-10-15 RX ORDER — ONDANSETRON 4 MG/1
4 TABLET, ORALLY DISINTEGRATING ORAL EVERY 6 HOURS PRN
Status: DISCONTINUED | OUTPATIENT
Start: 2019-10-15 | End: 2019-10-16 | Stop reason: HOSPADM

## 2019-10-15 RX ORDER — VANCOMYCIN HYDROCHLORIDE 1 G/20ML
INJECTION, POWDER, LYOPHILIZED, FOR SOLUTION INTRAVENOUS PRN
Status: DISCONTINUED | OUTPATIENT
Start: 2019-10-15 | End: 2019-10-15 | Stop reason: HOSPADM

## 2019-10-15 RX ORDER — KETOROLAC TROMETHAMINE 30 MG/ML
INJECTION, SOLUTION INTRAMUSCULAR; INTRAVENOUS PRN
Status: DISCONTINUED | OUTPATIENT
Start: 2019-10-15 | End: 2019-10-15 | Stop reason: HOSPADM

## 2019-10-15 RX ORDER — KETOROLAC TROMETHAMINE 30 MG/ML
30 INJECTION, SOLUTION INTRAMUSCULAR; INTRAVENOUS EVERY 6 HOURS
Status: DISCONTINUED | OUTPATIENT
Start: 2019-10-15 | End: 2019-10-16 | Stop reason: HOSPADM

## 2019-10-15 RX ORDER — ACETAMINOPHEN 325 MG/1
975 TABLET ORAL EVERY 8 HOURS
Status: DISCONTINUED | OUTPATIENT
Start: 2019-10-15 | End: 2019-10-16 | Stop reason: HOSPADM

## 2019-10-15 RX ORDER — FENTANYL CITRATE 50 UG/ML
INJECTION, SOLUTION INTRAMUSCULAR; INTRAVENOUS PRN
Status: DISCONTINUED | OUTPATIENT
Start: 2019-10-15 | End: 2019-10-15

## 2019-10-15 RX ORDER — PROCHLORPERAZINE MALEATE 10 MG
10 TABLET ORAL EVERY 6 HOURS PRN
Status: DISCONTINUED | OUTPATIENT
Start: 2019-10-15 | End: 2019-10-16 | Stop reason: HOSPADM

## 2019-10-15 RX ORDER — NALOXONE HYDROCHLORIDE 0.4 MG/ML
.1-.4 INJECTION, SOLUTION INTRAMUSCULAR; INTRAVENOUS; SUBCUTANEOUS
Status: DISCONTINUED | OUTPATIENT
Start: 2019-10-15 | End: 2019-10-16 | Stop reason: HOSPADM

## 2019-10-15 RX ORDER — VECURONIUM BROMIDE 1 MG/ML
INJECTION, POWDER, LYOPHILIZED, FOR SOLUTION INTRAVENOUS PRN
Status: DISCONTINUED | OUTPATIENT
Start: 2019-10-15 | End: 2019-10-15

## 2019-10-15 RX ORDER — CELECOXIB 200 MG/1
400 CAPSULE ORAL ONCE
Status: DISCONTINUED | OUTPATIENT
Start: 2019-10-15 | End: 2019-10-15 | Stop reason: HOSPADM

## 2019-10-15 RX ORDER — LIDOCAINE HYDROCHLORIDE 20 MG/ML
INJECTION, SOLUTION INFILTRATION; PERINEURAL PRN
Status: DISCONTINUED | OUTPATIENT
Start: 2019-10-15 | End: 2019-10-15

## 2019-10-15 RX ORDER — MAGNESIUM HYDROXIDE 1200 MG/15ML
LIQUID ORAL PRN
Status: DISCONTINUED | OUTPATIENT
Start: 2019-10-15 | End: 2019-10-15 | Stop reason: HOSPADM

## 2019-10-15 RX ORDER — DEXTROSE MONOHYDRATE, SODIUM CHLORIDE, AND POTASSIUM CHLORIDE 50; 1.49; 4.5 G/1000ML; G/1000ML; G/1000ML
INJECTION, SOLUTION INTRAVENOUS CONTINUOUS
Status: DISCONTINUED | OUTPATIENT
Start: 2019-10-15 | End: 2019-10-16 | Stop reason: HOSPADM

## 2019-10-15 RX ORDER — ACETAMINOPHEN 500 MG
1000 TABLET ORAL ONCE
Status: COMPLETED | OUTPATIENT
Start: 2019-10-15 | End: 2019-10-15

## 2019-10-15 RX ORDER — CEFAZOLIN SODIUM IN 0.9 % NACL 3 G/100 ML
3 INTRAVENOUS SOLUTION, PIGGYBACK (ML) INTRAVENOUS
Status: COMPLETED | OUTPATIENT
Start: 2019-10-15 | End: 2019-10-15

## 2019-10-15 RX ORDER — CEFAZOLIN SODIUM 2 G/100ML
2 INJECTION, SOLUTION INTRAVENOUS EVERY 8 HOURS
Status: COMPLETED | OUTPATIENT
Start: 2019-10-15 | End: 2019-10-16

## 2019-10-15 RX ORDER — PREGABALIN 150 MG/1
150 CAPSULE ORAL ONCE
Status: COMPLETED | OUTPATIENT
Start: 2019-10-15 | End: 2019-10-15

## 2019-10-15 RX ORDER — HYDROMORPHONE HYDROCHLORIDE 1 MG/ML
.3-.5 INJECTION, SOLUTION INTRAMUSCULAR; INTRAVENOUS; SUBCUTANEOUS EVERY 5 MIN PRN
Status: DISCONTINUED | OUTPATIENT
Start: 2019-10-15 | End: 2019-10-15 | Stop reason: HOSPADM

## 2019-10-15 RX ORDER — ACETAMINOPHEN 325 MG/1
650 TABLET ORAL EVERY 4 HOURS PRN
Status: DISCONTINUED | OUTPATIENT
Start: 2019-10-18 | End: 2019-10-16 | Stop reason: HOSPADM

## 2019-10-15 RX ORDER — OXYCODONE HYDROCHLORIDE 5 MG/1
5-10 TABLET ORAL
Status: DISCONTINUED | OUTPATIENT
Start: 2019-10-15 | End: 2019-10-16 | Stop reason: HOSPADM

## 2019-10-15 RX ORDER — GLYCOPYRROLATE 0.2 MG/ML
INJECTION, SOLUTION INTRAMUSCULAR; INTRAVENOUS PRN
Status: DISCONTINUED | OUTPATIENT
Start: 2019-10-15 | End: 2019-10-15

## 2019-10-15 RX ORDER — ONDANSETRON 2 MG/ML
INJECTION INTRAMUSCULAR; INTRAVENOUS PRN
Status: DISCONTINUED | OUTPATIENT
Start: 2019-10-15 | End: 2019-10-15

## 2019-10-15 RX ORDER — ONDANSETRON 4 MG/1
4 TABLET, ORALLY DISINTEGRATING ORAL EVERY 30 MIN PRN
Status: DISCONTINUED | OUTPATIENT
Start: 2019-10-15 | End: 2019-10-15 | Stop reason: HOSPADM

## 2019-10-15 RX ORDER — ONDANSETRON 2 MG/ML
4 INJECTION INTRAMUSCULAR; INTRAVENOUS EVERY 6 HOURS PRN
Status: DISCONTINUED | OUTPATIENT
Start: 2019-10-15 | End: 2019-10-16 | Stop reason: HOSPADM

## 2019-10-15 RX ADMIN — MIDAZOLAM 2 MG: 1 INJECTION INTRAMUSCULAR; INTRAVENOUS at 12:12

## 2019-10-15 RX ADMIN — Medication 3 G: at 12:30

## 2019-10-15 RX ADMIN — PROPOFOL 20 MG: 10 INJECTION, EMULSION INTRAVENOUS at 12:58

## 2019-10-15 RX ADMIN — VECURONIUM BROMIDE 2 MG: 1 INJECTION, POWDER, LYOPHILIZED, FOR SOLUTION INTRAVENOUS at 13:15

## 2019-10-15 RX ADMIN — PREGABALIN 150 MG: 150 CAPSULE ORAL at 10:02

## 2019-10-15 RX ADMIN — DEXAMETHASONE SODIUM PHOSPHATE 4 MG: 4 INJECTION, SOLUTION INTRA-ARTICULAR; INTRALESIONAL; INTRAMUSCULAR; INTRAVENOUS; SOFT TISSUE at 12:50

## 2019-10-15 RX ADMIN — HYDROMORPHONE HYDROCHLORIDE 0.5 MG: 1 INJECTION, SOLUTION INTRAMUSCULAR; INTRAVENOUS; SUBCUTANEOUS at 16:04

## 2019-10-15 RX ADMIN — ROCURONIUM BROMIDE 50 MG: 10 INJECTION INTRAVENOUS at 12:18

## 2019-10-15 RX ADMIN — Medication 3 G: at 14:23

## 2019-10-15 RX ADMIN — PROPOFOL 40 MCG/KG/MIN: 10 INJECTION, EMULSION INTRAVENOUS at 12:30

## 2019-10-15 RX ADMIN — FENTANYL CITRATE 50 MCG: 50 INJECTION, SOLUTION INTRAMUSCULAR; INTRAVENOUS at 12:56

## 2019-10-15 RX ADMIN — VECURONIUM BROMIDE 1 MG: 1 INJECTION, POWDER, LYOPHILIZED, FOR SOLUTION INTRAVENOUS at 13:44

## 2019-10-15 RX ADMIN — LIDOCAINE HYDROCHLORIDE 80 MG: 20 INJECTION, SOLUTION INFILTRATION; PERINEURAL at 12:18

## 2019-10-15 RX ADMIN — GLYCOPYRROLATE 1 MG: 0.2 INJECTION, SOLUTION INTRAMUSCULAR; INTRAVENOUS at 14:32

## 2019-10-15 RX ADMIN — POTASSIUM CHLORIDE, DEXTROSE MONOHYDRATE AND SODIUM CHLORIDE: 150; 5; 450 INJECTION, SOLUTION INTRAVENOUS at 17:28

## 2019-10-15 RX ADMIN — SODIUM CHLORIDE, POTASSIUM CHLORIDE, SODIUM LACTATE AND CALCIUM CHLORIDE: 600; 310; 30; 20 INJECTION, SOLUTION INTRAVENOUS at 10:02

## 2019-10-15 RX ADMIN — ACETAMINOPHEN 975 MG: 325 TABLET, FILM COATED ORAL at 20:23

## 2019-10-15 RX ADMIN — PROPOFOL 180 MG: 10 INJECTION, EMULSION INTRAVENOUS at 12:18

## 2019-10-15 RX ADMIN — SODIUM CHLORIDE 1 G: 9 INJECTION, SOLUTION INTRAVENOUS at 12:40

## 2019-10-15 RX ADMIN — NEOSTIGMINE METHYLSULFATE 5 MG: 1 INJECTION, SOLUTION INTRAVENOUS at 14:32

## 2019-10-15 RX ADMIN — ACETAMINOPHEN 1000 MG: 500 TABLET, FILM COATED ORAL at 10:02

## 2019-10-15 RX ADMIN — DEXMEDETOMIDINE HYDROCHLORIDE 0.3 MCG/KG/HR: 100 INJECTION, SOLUTION INTRAVENOUS at 12:35

## 2019-10-15 RX ADMIN — ONDANSETRON 4 MG: 2 INJECTION INTRAMUSCULAR; INTRAVENOUS at 14:32

## 2019-10-15 RX ADMIN — KETOROLAC TROMETHAMINE 30 MG: 30 INJECTION, SOLUTION INTRAMUSCULAR at 20:23

## 2019-10-15 RX ADMIN — FENTANYL CITRATE 50 MCG: 50 INJECTION, SOLUTION INTRAMUSCULAR; INTRAVENOUS at 12:18

## 2019-10-15 RX ADMIN — SODIUM CHLORIDE 1 G: 9 INJECTION, SOLUTION INTRAVENOUS at 14:29

## 2019-10-15 RX ADMIN — FENTANYL CITRATE 100 MCG: 50 INJECTION, SOLUTION INTRAMUSCULAR; INTRAVENOUS at 12:57

## 2019-10-15 RX ADMIN — SODIUM CHLORIDE, POTASSIUM CHLORIDE, SODIUM LACTATE AND CALCIUM CHLORIDE: 600; 310; 30; 20 INJECTION, SOLUTION INTRAVENOUS at 13:10

## 2019-10-15 RX ADMIN — HYDROMORPHONE HYDROCHLORIDE 0.5 MG: 1 INJECTION, SOLUTION INTRAMUSCULAR; INTRAVENOUS; SUBCUTANEOUS at 13:20

## 2019-10-15 RX ADMIN — CEFAZOLIN SODIUM 2 G: 2 INJECTION, SOLUTION INTRAVENOUS at 22:39

## 2019-10-15 ASSESSMENT — MIFFLIN-ST. JEOR: SCORE: 1810.12

## 2019-10-15 ASSESSMENT — ACTIVITIES OF DAILY LIVING (ADL): ADLS_ACUITY_SCORE: 17

## 2019-10-15 NOTE — ANESTHESIA CARE TRANSFER NOTE
Patient: Rupal Puri    Procedure(s):  RIGHT TOTAL HIP ARTHROPLASTY DIRECT ANTERIOR APPROACH      Diagnosis: RIGHT HIP DJD  Diagnosis Additional Information: No value filed.    Anesthesia Type:   General, ETT     Note:  Airway :Face Mask  Patient transferred to:PACU  Comments: Neuromuscular blockade reversed after TOF 4/4, spontaneous respirations, adequate tidal volumes, followed commands to voice, oropharynx suctioned with soft flexible catheter, extubated atraumatically, extubated with suction, airway patent after extubation.  Oxygen via facemask at 6 liters per minute to PACU. Oxygen tubing connected to wall O2 in PACU, SpO2, NiBP, and EKG monitors and alarms on and functioning, Jessica Hugger warmer connected to patient gown, report on patient's clinical status given to PACU RN, RN questions answered. Handoff Report: Identifed the Patient, Identified the Reponsible Provider, Reviewed the pertinent medical history, Discussed the surgical course, Reviewed Intra-OP anesthesia mangement and issues during anesthesia, Set expectations for post-procedure period and Allowed opportunity for questions and acknowledgement of understanding      Vitals: (Last set prior to Anesthesia Care Transfer)    CRNA VITALS  10/15/2019 1439 - 10/15/2019 1517      10/15/2019             Pulse:  53    SpO2:  100 %    Resp Rate (observed):  17    Resp Rate (set):  BP:  8  122/75                Electronically Signed By: BRANDY Mg CRNA  October 15, 2019  3:17 PM

## 2019-10-15 NOTE — CONSULTS
hospitalist consult received; pt is a 58yo female with PMHx morbid obesity, OA, migraine HAs, HLD who underwent previously scheduled right EMILIANO. Surgery was performed without complication, anticipated discharge to home on POD1. Pt does not have chronic medical conditions requiring daily monitoring, hospitalist will sign-off. Call with concerns.    Lennox Winters PA-C  10/15/2019, 6:04 PM  Pager: [phone]

## 2019-10-15 NOTE — ANESTHESIA POSTPROCEDURE EVALUATION
Patient: Rupal Puri    Procedure(s):  RIGHT TOTAL HIP ARTHROPLASTY DIRECT ANTERIOR APPROACH      Diagnosis:RIGHT HIP DJD  Diagnosis Additional Information: No value filed.    Anesthesia Type:  General, ETT    Note:  Anesthesia Post Evaluation    Patient location during evaluation: PACU  Patient participation: Able to fully participate in evaluation  Level of consciousness: awake, awake and alert and responsive to verbal stimuli  Pain management: adequate  Airway patency: patent  Cardiovascular status: acceptable  Respiratory status: acceptable  Hydration status: acceptable  PONV: none     Anesthetic complications: None          Last vitals:  Vitals:    10/15/19 1550 10/15/19 1600 10/15/19 1610   BP: 115/65 136/87 110/65   Pulse: 56 58 60   Resp: 16 12 15   Temp: 36.6  C (97.9  F) 36.7  C (98.1  F) 36.7  C (98.1  F)   SpO2: 100% 100% 100%         Electronically Signed By: Crys Mckeon  October 15, 2019  4:18 PM

## 2019-10-15 NOTE — OR NURSING
Dr. Crys Mckeon notified of frequent PVCs with bijiminy..  Dr. Mckeon to bedside.  Observed rhythm.  Patient has fewer PVCs the longer she is in pacu.  No new orders.

## 2019-10-15 NOTE — ANESTHESIA PREPROCEDURE EVALUATION
Anesthesia Pre-Procedure Evaluation    Patient: Rupal Puri   MRN: [id number] : 1960          Preoperative Diagnosis: RIGHT HIP DJD    Procedure(s):  RIGHT TOTAL HIP ARTHROPLASTY DIRECT ANTERIOR APPROACH (DEPUY)^    Past Medical History:   Diagnosis Date     Abnormal cardiovascular stress test      Arthritis      Chest pain      Contact dermatitis and other eczema, due to unspecified cause      Gastric ulcer      Heart disease      Migraine, unspecified, without mention of intractable migraine without mention of status migrainosus      Mild intermittent asthma      Obese      Plantar fascial fibromatosis      PONV (postoperative nausea and vomiting)      Past Surgical History:   Procedure Laterality Date     ARTHROPLASTY KNEE  2013    Procedure: ARTHROPLASTY KNEE;  RIGHT TOTAL KNEE ARTHROPLASTY ([employer])^;  Surgeon: Simon Pitts MD;  Location:  OR     ARTHROPLASTY KNEE  2014    Procedure: ARTHROPLASTY KNEE;  Surgeon: Simon Pitts MD;  Location:  OR     AS REPAIR FEMORAL HERNIA,REDUCIBLE  1991    hernia repair femoral     ESOPHAGOSCOPY, GASTROSCOPY, DUODENOSCOPY (EGD), COMBINED  2014    Procedure: COMBINED ESOPHAGOSCOPY, GASTROSCOPY, DUODENOSCOPY (EGD), BIOPSY SINGLE OR MULTIPLE;  Surgeon: Natalie Gibbons MD;  Location:  GI     OSTEOTOMY FOOT Left 2019    Procedure: LEFT SECOND METATARSALPHALANGEAL CAPSULE REPAIR ; SECOND AND THIRD METATARSAL  OSTEOTOMY;  Surgeon: Christina Durán MD;  Location:  OR     PLACEMENT OF DENTAL IMPLANT(S)       TONSILLECTOMY & ADENOIDECTOMY  1966    age 6       Anesthesia Evaluation     . Pt has had prior anesthetic.     History of anesthetic complications   - PONV        ROS/MED HX    ENT/Pulmonary:     (+)sleep apnea, asthma uses CPAP , . .    Neurologic:     (+)migraines,     Cardiovascular: Comment: Abnormal stress test with 10% reversible ischemia.      LBBB    (+) Dyslipidemia, --CAD, --. : . . . :. .      (-)  "angina and angina   METS/Exercise Tolerance:     Hematologic:         Musculoskeletal:         GI/Hepatic:     (+) Other GI/Hepatic history of gastric ulcer     (-) GERD   Renal/Genitourinary:         Endo:     (+) Obesity, .      Psychiatric:         Infectious Disease:         Malignancy:         Other:                          Physical Exam      Airway   Mallampati: III  TM distance: >3 FB  Neck ROM: full    Dental   Comment: veneers    Cardiovascular   Rhythm and rate: regular      Pulmonary    breath sounds clear to auscultation            Lab Results   Component Value Date    WBC 7.2 09/16/2019    HGB 13.5 10/15/2019    HCT 41.5 09/16/2019     09/16/2019     07/28/2018    POTASSIUM 3.7 07/28/2018    CHLORIDE 107 07/28/2018    CO2 26 07/28/2018    BUN 25 07/28/2018    CR 0.75 09/17/2019     (H) 07/28/2018    GWENDOLYN 9.3 07/28/2018    ALBUMIN 3.8 07/28/2018    PROTTOTAL 7.2 07/28/2018    ALT 32 07/28/2018    AST 20 07/28/2018    ALKPHOS 61 07/28/2018    BILITOTAL 0.3 07/28/2018    LIPASE 247 07/28/2018    PTT 29 06/19/2006    INR 0.89 04/23/2013    TSH 1.28 05/17/2016       Preop Vitals  BP Readings from Last 3 Encounters:   10/15/19 106/79   10/04/19 120/78   09/17/19 (!) 143/86    Pulse Readings from Last 3 Encounters:   10/15/19 72   10/04/19 82   09/16/19 82      Resp Readings from Last 3 Encounters:   10/15/19 17   09/17/19 18   05/13/19 16    SpO2 Readings from Last 3 Encounters:   10/15/19 95%   10/04/19 97%   09/17/19 99%      Temp Readings from Last 1 Encounters:   10/15/19 36.9  C (98.4  F) (Temporal)    Ht Readings from Last 1 Encounters:   10/15/19 1.676 m (5' 6\")      Wt Readings from Last 1 Encounters:   10/15/19 121.3 kg (267 lb 8 oz)    Estimated body mass index is 43.18 kg/m  as calculated from the following:    Height as of this encounter: 1.676 m (5' 6\").    Weight as of this encounter: 121.3 kg (267 lb 8 oz).       Anesthesia Plan      History & Physical Review  History and " physical reviewed and following examination; no interval change.    ASA Status:  3 .    NPO Status:  > 8 hours    Plan for General and ETT   PONV prophylaxis:  Ondansetron (or other 5HT-3) and Dexamethasone or Solumedrol  Additional equipment: Videolaryngoscope      Postoperative Care      Consents  Anesthetic plan, risks, benefits and alternatives discussed with:  Patient..                 Crys Mckeon

## 2019-10-15 NOTE — BRIEF OP NOTE
[facility]    Brief Operative Note    Pre-operative diagnosis: Right hip OA  Post-operative diagnosis same  Procedure: RIGHT DIRECT ANTERIOR TOTAL HIP ARTHROPLASTY  Surgeon(s) and Role:     * Simon Pitts MD - Primary     * Monroe Magallanes PA-C - Assisting  Anesthesia: General   Estimated blood loss: 300 ml  Drains:  None  Specimens: None  Findings:  Advanced OA  Complications: None    Plan: DC home POD1 w/family assist.  DVT prophylaxis w/ASA 325mg QD x6wks.      Implants:   Implant Name Type Inv. Item Serial No.  Lot No. LRB No. Used   IMP CUP ACE PINNACLE 56MM 1217- Total Joint Component/Insert IMP CUP ACE PINNACLE 56MM 1217-  [facility]amp[facility] [id number] Right 1   IMP APEX HOLE ELIMINATOR HIP DEPUY DURALOC 1246- Metallic Hardware/[place] IMP APEX HOLE ELIMINATOR HIP DEPUY DURALOC 1246-  &amp[facility] [id number] Right 1   IMP SCR BONE CAN ACE 6.5X25MM 1217- Metallic Hardware/[place] IMP SCR BONE CAN ACE 6.5X25MM 1217-  &amp[facility] [id number] Right 1   IMP SCR BONE CAN ACE 6.5X20MM 1217- Metallic Hardware/[place] IMP SCR BONE CAN ACE 6.5X20MM 1217-  &amp;[facility] [id number] Right 1   IMP SCR BONE CAN ACE 6.5X35MM 1217- Metallic Hardware/[place] IMP SCR BONE CAN ACE 6.5X35MM 1217-  &amp[facility] [id number] Right 1   PINNACLE ALTRX POLYETHYLENE ACETABULAR LINER NEUTRAL. 36MMID, 56MMOD    Depuy [id number] Right 1   FEMORAL STEM 12/14 TAPER ACTIS DUOFIX HIP PROSTHESIS CEMENTLESS SIZE 7 HIGH COLLAR    Depuy [id number] Right 1   IMP HEAD FEMORAL DEPUY CERAMIC 36MM +5MM [id number] Total Joint Component/Insert IMP HEAD FEMORAL DEPUY CERAMIC 36MM +5MM [id number]  [facility]   [id number] Right 1

## 2019-10-15 NOTE — PLAN OF CARE
PT: Original PT eval scheduled at 4PM, pt still in PACU. PT eval r/s for 5PM, pt arrived to unit at approximately 1720, discussed with RN who is currently settling pt in room who states pt not yet appropriate for PT. Discussed with RN plan for nursing to dangle/ambulate pt this evening.

## 2019-10-15 NOTE — PLAN OF CARE
Patient arrived to the floor from PACU at 1520.  A&OX4, sleeping in between cares.  Escamilla patent.  Rating pain 3/10, ice pack utilized.  Tolerating clear liquid diet.  CMS intact.  Dressing C/D/I.  Patient has not yet dangled due to being sleepy and currently she is eating dinner.

## 2019-10-16 ENCOUNTER — APPOINTMENT (OUTPATIENT)
Dept: PHYSICAL THERAPY | Facility: CLINIC | Age: 59
End: 2019-10-16
Attending: ORTHOPAEDIC SURGERY
Payer: COMMERCIAL

## 2019-10-16 VITALS
HEIGHT: 66 IN | OXYGEN SATURATION: 97 % | TEMPERATURE: 97.9 F | BODY MASS INDEX: 42.99 KG/M2 | RESPIRATION RATE: 16 BRPM | HEART RATE: 59 BPM | DIASTOLIC BLOOD PRESSURE: 49 MMHG | SYSTOLIC BLOOD PRESSURE: 99 MMHG | WEIGHT: 267.5 LBS

## 2019-10-16 LAB
GLUCOSE SERPL-MCNC: 130 MG/DL (ref 70–99)
HGB BLD-MCNC: 11 G/DL (ref 11.7–15.7)

## 2019-10-16 PROCEDURE — 25000132 ZZH RX MED GY IP 250 OP 250 PS 637: Performed by: ORTHOPAEDIC SURGERY

## 2019-10-16 PROCEDURE — 82947 ASSAY GLUCOSE BLOOD QUANT: CPT | Performed by: ORTHOPAEDIC SURGERY

## 2019-10-16 PROCEDURE — 97110 THERAPEUTIC EXERCISES: CPT | Mod: GP | Performed by: PHYSICAL THERAPIST

## 2019-10-16 PROCEDURE — 25000128 H RX IP 250 OP 636: Performed by: ORTHOPAEDIC SURGERY

## 2019-10-16 PROCEDURE — 97161 PT EVAL LOW COMPLEX 20 MIN: CPT | Mod: GP | Performed by: PHYSICAL THERAPIST

## 2019-10-16 PROCEDURE — 85018 HEMOGLOBIN: CPT | Performed by: ORTHOPAEDIC SURGERY

## 2019-10-16 PROCEDURE — 97530 THERAPEUTIC ACTIVITIES: CPT | Mod: GP | Performed by: PHYSICAL THERAPIST

## 2019-10-16 PROCEDURE — 40000894 ZZH STATISTIC OT IP EVAL DEFER

## 2019-10-16 PROCEDURE — 97116 GAIT TRAINING THERAPY: CPT | Mod: GP | Performed by: PHYSICAL THERAPIST

## 2019-10-16 PROCEDURE — 25800030 ZZH RX IP 258 OP 636: Performed by: ORTHOPAEDIC SURGERY

## 2019-10-16 PROCEDURE — 36415 COLL VENOUS BLD VENIPUNCTURE: CPT | Performed by: ORTHOPAEDIC SURGERY

## 2019-10-16 RX ORDER — OXYCODONE HYDROCHLORIDE 5 MG/1
5-10 TABLET ORAL EVERY 6 HOURS PRN
Qty: 5 TABLET | Refills: 0 | Status: SHIPPED | OUTPATIENT
Start: 2019-10-16 | End: 2019-10-21

## 2019-10-16 RX ORDER — ACETAMINOPHEN 500 MG
500-1000 TABLET ORAL EVERY 6 HOURS PRN
Qty: 60 TABLET | Refills: 0
Start: 2019-10-16 | End: 2021-12-17

## 2019-10-16 RX ORDER — CELECOXIB 200 MG/1
200 CAPSULE ORAL DAILY
Qty: 30 CAPSULE | Refills: 0 | Status: SHIPPED | OUTPATIENT
Start: 2019-10-16 | End: 2019-10-21

## 2019-10-16 RX ADMIN — ACETAMINOPHEN 975 MG: 325 TABLET, FILM COATED ORAL at 03:17

## 2019-10-16 RX ADMIN — ASPIRIN 325 MG: 325 TABLET, DELAYED RELEASE ORAL at 08:03

## 2019-10-16 RX ADMIN — SENNOSIDES AND DOCUSATE SODIUM 2 TABLET: 8.6; 5 TABLET ORAL at 08:03

## 2019-10-16 RX ADMIN — KETOROLAC TROMETHAMINE 30 MG: 30 INJECTION, SOLUTION INTRAMUSCULAR at 08:03

## 2019-10-16 RX ADMIN — CEFAZOLIN SODIUM 2 G: 2 INJECTION, SOLUTION INTRAVENOUS at 06:37

## 2019-10-16 RX ADMIN — POTASSIUM CHLORIDE, DEXTROSE MONOHYDRATE AND SODIUM CHLORIDE: 150; 5; 450 INJECTION, SOLUTION INTRAVENOUS at 03:17

## 2019-10-16 RX ADMIN — ACETAMINOPHEN 975 MG: 325 TABLET, FILM COATED ORAL at 11:51

## 2019-10-16 RX ADMIN — KETOROLAC TROMETHAMINE 30 MG: 30 INJECTION, SOLUTION INTRAMUSCULAR at 03:17

## 2019-10-16 ASSESSMENT — ACTIVITIES OF DAILY LIVING (ADL)
ADLS_ACUITY_SCORE: 17
ADLS_ACUITY_SCORE: 13
ADLS_ACUITY_SCORE: 17
ADLS_ACUITY_SCORE: 17

## 2019-10-16 NOTE — PLAN OF CARE
Patient plan: home with dtr  Current status: PT eval and treatment completed.  Pt s/p EMILIANO, no precautions, WBAT, previously I at home with adult dtr.  Pt with hx of B TKA.  Pt currently I with bed mob and transfers, amb with crutches with mod I 350, mod I on steps with 1 crutch and 1 rail.  I with HEP with HO  Anticipated status at discharge: pt has own crutches, dtr to A with household tasks as need.    Physical Therapy Discharge Summary    Reason for therapy discharge:    All goals and outcomes met, no further needs identified.    Progress towards therapy goal(s). See goals on Care Plan in [facility] electronic health record for goal details.  Goals met    Therapy recommendation(s):    Continue home exercise program.

## 2019-10-16 NOTE — PLAN OF CARE
Patient is A&O, VSS except low HR in 30s, on 4L NC, CMS intact, regular diet, up with assist of 1, and dressing CDI. IVF infusing, on scheduled Toradol and Tylenol, and Escamilla discontinue-DTV. Will continue to monitor

## 2019-10-16 NOTE — PLAN OF CARE
Patient up with SBA and walker. Adequate I/O. Pain managed with Tylenol. Discharge AVS and medications reviewed with patient, all questions answered. Patient declined to have daughter present for discharge instructions. Patient discharged home with all belongings.

## 2019-10-16 NOTE — PLAN OF CARE
Discharge Planner OT   Patient plan for discharge: Home.  Current status: Met with patient who reports no difficulty with ADLs. Has already dressed herself and has completed bathroom transfers.    Barriers to return to prior living situation: None.   Recommendations for discharge: Home.  Rationale for recommendations: OT eval not indicated as patient is I with ADL. Order completed.       Entered by: Maddy Tavares 10/16/2019 5:01 PM

## 2019-10-16 NOTE — PROGRESS NOTES
"Orthopedic Surgery  10/16/2019  POD #1    S: Patient voices no complaints today.     O: Blood pressure 98/55, pulse 59, temperature 97.9  F (36.6  C), temperature source Oral, resp. rate 18, height 1.676 m (5' 6\"), weight 121.3 kg (267 lb 8 oz), last menstrual period 03/10/2013, SpO2 100 %, not currently breastfeeding.  Lab Results   Component Value Date    HGB 11.0 10/16/2019     Neurovascularly intact.  Calves are negative bilaterally, both soft and nontender.  The dressing is C/D/I.  The wound looks good with minimal erythema of the surrounding skin.    A: Ms. Puri is doing well status post Procedure(s):  RIGHT TOTAL HIP ARTHROPLASTY DIRECT ANTERIOR APPROACH  .    P: No dressing change, patient to remove outer dressing on POD3, leaving mesh adhesive in place.  Continue physical therapy. Discharge to home today.    Discussed, will use ASA  qday for DVT proph.    Hold on celebrex for now.        Simon Toledo  [phone]    "

## 2019-10-16 NOTE — PROGRESS NOTES
10/16/19 0900   Quick Adds   Type of Visit Initial PT Evaluation   Living Environment   Lives With child(bryant), adult   Living Arrangements house   Home Accessibility stairs to enter home;stairs within home   Number of Stairs, Main Entrance 1   Stair Railings, Main Entrance none   Number of Stairs, Within Home, Primary 10   Stair Railings, Within Home, Primary railing on right side (ascending)   Transportation Anticipated family or friend will provide   Self-Care   Usual Activity Tolerance moderate   Current Activity Tolerance fair   Regular Exercise Yes   Activity/Exercise Type walking   Equipment Currently Used at Home crutches   Functional Level Prior   Ambulation 0-->independent   Transferring 0-->independent   Toileting 0-->independent   Bathing 0-->independent   Communication 0-->understands/communicates without difficulty   Swallowing 0-->swallows foods/liquids without difficulty   Cognition 0 - no cognition issues reported   Fall history within last six months no   General Information   Onset of Illness/Injury or Date of Surgery - Date 10/16/19   Referring Physician Simon Aponte   Patient/Family Goals Statement pt plans on dc to home with dtr   Pertinent History of Current Problem (include personal factors and/or comorbidities that impact the POC) s/p EMILIANO   Precautions/Limitations no known precautions/limitations   Weight-Bearing Status - LUE full weight-bearing   Weight-Bearing Status - RUE full weight-bearing   Weight-Bearing Status - LLE full weight-bearing   Weight-Bearing Status - RLE weight-bearing as tolerated   Cognitive Status Examination   Orientation orientation to person, place and time   Level of Consciousness alert   Follows Commands and Answers Questions 100% of the time   Personal Safety and Judgment intact   Memory intact   Pain Assessment   Patient Currently in Pain Yes, see Vital Sign flowsheet  (2/10)   Integumentary/Edema   Integumentary/Edema Comments per surgery   Range of Motion  "(ROM)   ROM Comment decreased R hip ROM post op   Strength   Strength Comments decreased R LE strength due to pain and surgery   Bed Mobility   Bed Mobility Comments PT bed mob I   Transfer Skills   Transfer Comments sit to stand I   Gait   Gait Comments amb with FWW with SBA and cues 30'   General Therapy Interventions   Planned Therapy Interventions bed mobility training;gait training;ROM;strengthening;transfer training   Clinical Impression   Criteria for Skilled Therapeutic Intervention yes, treatment indicated   PT Diagnosis difficulty walking   Influenced by the following impairments post op pain, decreased act jazmin   Functional limitations due to impairments impaired functional mob I and safety   Clinical Presentation Stable/Uncomplicated   Clinical Presentation Rationale clinical judgement   Clinical Decision Making (Complexity) Low complexity   Therapy Frequency Daily   Predicted Duration of Therapy Intervention (days/wks) 1 day   Anticipated Discharge Disposition Home   Risk & Benefits of therapy have been explained Yes   Patient, Family & other staff in agreement with plan of care Yes   [facility] TM \"6 Clicks\"   2016, Trustees of [facility], under license to [employer].  All rights reserved.   6 Clicks Short Forms Basic Mobility Inpatient Short Form   [facility]  \"6 Clicks\" V.2 Basic Mobility Inpatient Short Form   1. Turning from your back to your side while in a flat bed without using bedrails? 4 - None   2. Moving from lying on your back to sitting on the side of a flat bed without using bedrails? 4 - None   3. Moving to and from a bed to a chair (including a wheelchair)? 4 - None   4. Standing up from a chair using your arms (e.g., wheelchair, or bedside chair)? 4 - None   5. To walk in hospital room? 4 - None   6. Climbing 3-5 steps with a railing? 4 - None   Basic Mobility Raw Score (Score out of 24.Lower scores equate to lower levels of function) 24   Total " Evaluation Time   Total Evaluation Time (Minutes) 10

## 2019-10-18 NOTE — DISCHARGE SUMMARY
"Discharge Summary    Rupal Puri MRN# [id number]   YOB: 1960 Age: 59 year old     Date of Admission: 10/15/2019    Date of Discharge: 10/16/2019    Reason for Admission: Rupal Puri is an 59 year old female who was admitted to the hospital following surgery.    Preoperative Diagnosis: RIGHT HIP DJD    Postoperative Diagnosis: RIGHT HIP DJD    Procedure Completed:  Right total hip arthroplasty    Hospital Course:  Ms. Puri was admitted and underwent the above procedure. The patient tolerated the procedure well. There were no complications. Following surgery she was admitted to the floor.  During her stay she did not require any blood transfusions. Her pain was controlled with oral pain medication.  During her stay she progressed well in physical therapy and all the therapy goals were met.     Discharge Physical Exam:  BP 99/49 (BP Location: Left arm)   Pulse 59   Temp 97.9  F (36.6  C) (Oral)   Resp 16   Ht 1.676 m (5' 6\")   Wt 121.3 kg (267 lb 8 oz)   LMP 03/10/2013   SpO2 97%   BMI 43.18 kg/m    Neurovascularly intact, distal pulses present bilaterally.  Calves are negative bilaterally, both soft and nontender.    Assessment: Ms. Puri is stable and doing well status post Right total hip arthroplasty.    Plan: We will discharge her home on analgesics and deep venous thrombosis prophylaxis.  She will follow-up with me approximately 2 weeks from surgery.  She may call [phone] to schedule an appointment.    Meds:   Rupal Puri   Home Medication Instructions BRIJESH:[id number]    Printed on:10/18/19 5221   Medication Information                      acetaminophen (TYLENOL) 500 MG tablet  Take 1-2 tablets (500-1,000 mg) by mouth every 6 hours as needed for mild pain             allopurinol (ZYLOPRIM) 100 MG tablet  Take 1 tablet (100 mg) by mouth daily             aspirin (ASA) 325 MG EC tablet  Take 1 tablet (325 mg) by mouth daily             celecoxib (CELEBREX) 200 " MG capsule  Take 1 capsule (200 mg) by mouth daily             colchicine (COLCYRS) 0.6 MG tablet  Take 1 tablet (0.6 mg) by mouth daily             oxyCODONE (ROXICODONE) 5 MG tablet  Take 1-2 tablets (5-10 mg) by mouth every 6 hours as needed for pain             rizatriptan (MAXALT-MLT) 10 MG ODT  DISSOLVE 1 TABLET ON TONGUE AT ONSET OF HEADACHE. MAY REPEAT IN 2 HOURS. MAX 3 TABLETS PER DAY

## 2019-10-20 NOTE — OP NOTE
DATE OF SERVICE:  10/15/2019    SURGEON  AURORA BAEZA M.D.    ASSISTANT  Tyler aMgallanes PA-C    PREOPERATIVE DIAGNOSIS   Right hip osteoarthritis, failed to respond to conservative management.    POSTOPERATIVE DIAGNOSIS   Right hip osteoarthritis, failed to respond to conservative management.    TITLE OF PROCEDURE   Right total hip arthroplasty, Depuy uncemented components, direct anterior approach.    PROCEDURE  The patient was brought to the operating room and after satisfactory anesthesia was placed on the [place] table. The right  lower extremity was then prepped and draped in the usual sterile fashion. Image intensification was utilized during the case for component positioning as well as leg length assessment. An incision was made just lateral to the ASIS and coursing toward the proximal femur. Dissection was carried down to the TFL. The fascia overlying the TFL was incised and dissection was carried down to the interval between TFL and rectus femoris. This was identified. A lateral cobra retractor was placed followed by a medial cobra around the femoral neck. The leash vessels, anterior circumflex, was identified and was coagulated. The underlying fascia was released. Dissection was carried down to the hip capsule. Capsule was identified and a capsulotomy was performed in a T-type fashion first along the intertrochanteric line and then secondarily up along the femoral neck and head, finally completed by releasing along the saddle of the trochanter. The capsular edges were tagged for later repair. The hip was then externally rotated and medial capsular release was performed such that the lesser trochanter could be palpated. Following this, the femoral neck was osteotomized as per the preoperative plan. The femoral head was removed with a corkscrew without difficulty. The acetabulum was exposed and was reamed sequentially up to 56 mm. This was reamed under both direct visualization as well as the aid of image  intensification. A 56 mm [place] cup was impacted into place in approximately 40 to 45 degrees of abduction, and 15 degrees of anteversion. Two screws were placed and this gave excellent fixation. The 36 mm neutral liner was impacted into place.     Attention was then directed to the femur. The hook was placed underneath the proximal aspect of the femur between the trochanteric ridge and gluteus michel. The hip was then brought into external rotation, adduction, and extension. The femur was then carefully elevated using the appropriate releases off the inside of the greater trochanter. Once the femur was elevated, a starter broach was placed followed by sequential broaches. The broaches were performed up to size 7 Actis, which gave excellent torsinal as well as axial stability. Trial reduction was performed with a  high offset +5mm head. The hip was reduced and with hip reduction the combined anteversion looked excellent. The hip was brought into full extension and external rotation. There was no evidence of instability. As well, x-rays were printed and compared with the opposite side and found to have good length and restoration of offset.  It was felt that an additional stem size could not be placed.  The hip was then dislocated and then the proximal femur was then brought back up into the proximal aspect of the wound. The real size 7 actis stem, high offset was impacted into place. Again this gave excellent torsion as well as axial stability. The real +5mm ceramic biolox head was impacted into place and the hip was reduced again. The image intensification confirmed excellent position of the components.   A 3 minute dilute betadine soak was performed.  The wound was thoroughly irrigated. The capsule was closed with interrupted 0 Vicryl suture and tissues infiltrated with toradol/marcaine mixture. The tensor fascia was closed with a running 0 Stratafix suture. The subcutaneous layer was closed with interrupted  2-0 Vicryl, 2-0 Stratafix, and 3-0 subcuticular monocryl was placed followed by mesh dressing with skin glue. One gram of vancomycin powder was placed deep and superficial prior to closure.  Sterile dressing was applied. The patient left the operating room in satisfactory condition. Patient received 1 gm of tranexamic acid pre-op and at closure.

## 2019-10-21 ENCOUNTER — MYC MEDICAL ADVICE (OUTPATIENT)
Dept: FAMILY MEDICINE | Facility: CLINIC | Age: 59
End: 2019-10-21

## 2019-10-21 ENCOUNTER — OFFICE VISIT (OUTPATIENT)
Dept: FAMILY MEDICINE | Facility: CLINIC | Age: 59
End: 2019-10-21

## 2019-10-21 VITALS
HEIGHT: 66 IN | SYSTOLIC BLOOD PRESSURE: 120 MMHG | OXYGEN SATURATION: 98 % | BODY MASS INDEX: 42.68 KG/M2 | WEIGHT: 265.6 LBS | HEART RATE: 67 BPM | DIASTOLIC BLOOD PRESSURE: 64 MMHG | RESPIRATION RATE: 16 BRPM | TEMPERATURE: 98.1 F

## 2019-10-21 DIAGNOSIS — R11.2 NON-INTRACTABLE VOMITING WITH NAUSEA, UNSPECIFIED VOMITING TYPE: Primary | ICD-10-CM

## 2019-10-21 DIAGNOSIS — T50.905A ADVERSE EFFECT OF DRUG, INITIAL ENCOUNTER: ICD-10-CM

## 2019-10-21 PROCEDURE — 99213 OFFICE O/P EST LOW 20 MIN: CPT | Performed by: FAMILY MEDICINE

## 2019-10-21 RX ORDER — FAMOTIDINE 10 MG
10 TABLET ORAL 2 TIMES DAILY
Qty: 30 TABLET | Refills: 0 | Status: SHIPPED | OUTPATIENT
Start: 2019-10-21 | End: 2020-07-13

## 2019-10-21 RX ORDER — ONDANSETRON 4 MG/1
4 TABLET, ORALLY DISINTEGRATING ORAL EVERY 6 HOURS PRN
Qty: 16 TABLET | Refills: 0 | Status: SHIPPED | OUTPATIENT
Start: 2019-10-21 | End: 2020-07-13

## 2019-10-21 SDOH — ECONOMIC STABILITY: TRANSPORTATION INSECURITY
IN THE PAST 12 MONTHS, HAS LACK OF TRANSPORTATION KEPT YOU FROM MEETINGS, WORK, OR FROM GETTING THINGS NEEDED FOR DAILY LIVING?: NO

## 2019-10-21 SDOH — ECONOMIC STABILITY: FOOD INSECURITY: WITHIN THE PAST 12 MONTHS, THE FOOD YOU BOUGHT JUST DIDN'T LAST AND YOU DIDN'T HAVE MONEY TO GET MORE.: NEVER TRUE

## 2019-10-21 SDOH — ECONOMIC STABILITY: TRANSPORTATION INSECURITY
IN THE PAST 12 MONTHS, HAS THE LACK OF TRANSPORTATION KEPT YOU FROM MEDICAL APPOINTMENTS OR FROM GETTING MEDICATIONS?: NO

## 2019-10-21 SDOH — ECONOMIC STABILITY: INCOME INSECURITY: HOW HARD IS IT FOR YOU TO PAY FOR THE VERY BASICS LIKE FOOD, HOUSING, MEDICAL CARE, AND HEATING?: NOT HARD AT ALL

## 2019-10-21 SDOH — ECONOMIC STABILITY: FOOD INSECURITY: WITHIN THE PAST 12 MONTHS, YOU WORRIED THAT YOUR FOOD WOULD RUN OUT BEFORE YOU GOT MONEY TO BUY MORE.: NEVER TRUE

## 2019-10-21 ASSESSMENT — MIFFLIN-ST. JEOR: SCORE: 1801.5

## 2019-10-21 NOTE — PATIENT INSTRUCTIONS
Non-intractable vomiting with nausea, unspecified vomiting type  (primary encounter diagnosis)  Comment: stop NSAID and Aspirin  Plan: ondansetron (ZOFRAN-ODT) 4 MG ODT tab,         famotidine (PEPCID) 10 MG tablet        Tylenol prn. Potential medication side effects were discussed with the patient; let me know if any occur.  Monitor for good continued resolution

## 2019-10-21 NOTE — NURSING NOTE
Rupal is here today for nausea and vomitting since her hip replacement on 10/15/19.    Pre-visit Screening:  Immunizations:  up to date  Colonoscopy:  is up to date  Mammogram: is up to date  Asthma Action Test/Plan:  NA  PHQ9:  NA  GAD7:  NA  Questioned patient about current smoking habits Pt. has quit smoking.  Ok to leave detailed message on voice mail for today's visit only Yes, phone # [phone]

## 2019-10-21 NOTE — PROGRESS NOTES
"SUBJECTIVE: 59 year old female complaining of stomach upset since taking indomethacin for a gout attack for 17 days/ emesis started after taking Celebrex after surgery for 2 day(s).   The patient describes getting better off Celebrex and aspirin/ not helpful with food and water with each dose. Today no nausea yet  The patient denies a history of gouty flare this time/ obesity .   Smoking history: No.   Relevant past medical history: positive for gout in her foot with last flare before surgery/ treated with colchicine and indomethacin.    No red flags, melena, fever or weight loss    OBJECTIVE: The patient appears healthy, alert, no distress, cooperative and smiling.   /64 (BP Location: Right arm, Patient Position: Sitting, Cuff Size: Adult Large)   Pulse 67   Temp 98.1  F (36.7  C) (Oral)   Resp 16   Ht 1.676 m (5' 6\")   Wt 120.5 kg (265 lb 9.6 oz)   LMP 03/10/2013   SpO2 98%   BMI 42.87 kg/m      EARS: negative  NOSE/SINUS: Nares normal. Septum midline. Mucosa normal. No drainage or sinus tenderness.   THROAT: normal   NECK:Neck supple. No adenopathy. Thyroid symmetric, normal size,, Carotids without bruits.   CHEST: Clear  ABD: The abdomen is soft without tenderness, guarding, mass or organomegaly. Bowel sounds are normal. No CVA tenderness or inguinal adenopathy noted.    ASSESSMENT: (R11.2) Non-intractable vomiting with nausea, unspecified vomiting type  (primary encounter diagnosis)  Comment: stop NSAID and Aspirin  Plan: ondansetron (ZOFRAN-ODT) 4 MG ODT tab,         famotidine (PEPCID) 10 MG tablet        Tylenol prn. Potential medication side effects were discussed with the patient; let me know if any occur.  Monitor for good continued resolution    (T50.905A) Adverse effect of drug, initial encounter  Plan: ondansetron (ZOFRAN-ODT) 4 MG ODT tab              "

## 2019-10-22 ENCOUNTER — CARE COORDINATION (OUTPATIENT)
Dept: FAMILY MEDICINE | Facility: CLINIC | Age: 59
End: 2019-10-22

## 2019-10-22 NOTE — PROGRESS NOTES
Care Coordination Assessment    PCP: Denia Salomon    Referral Source:  ED/IP List    Clinical Data: Patient discharged from inpatient at [facility] 10/16/2019 S/P Right hip replacement.  Patient discharged ome with instructions to follow up with surgeon in 2 weeks.  Appointment already scheduled.    Plan: Patient has appointment no follow up necessary

## 2019-10-28 ENCOUNTER — HEALTH MAINTENANCE LETTER (OUTPATIENT)
Age: 59
End: 2019-10-28

## 2019-10-29 NOTE — PROGRESS NOTES
Subjective     Chief Complaint   Patient presents with     Derm Problem       Rupal Puri is a 59 year old female who presents to clinic today for the following health issues:    HPI   Rash  Onset: 3 DAYS AFTER STARTING allopurinol    Description:   Location: wrist, trunk  Character: round, blotchy  Itching (Pruritis): YES    Progression of Symptoms:  improving    Accompanying Signs & Symptoms:  Fever: no   Body aches or joint pain: no       History:   Previous similar rash: no     Precipitating factors:   Exposure to similar rash: no   New exposures: medication     Recent travel: no           Patient Active Problem List   Diagnosis     Mixed hyperlipidemia     Metabolic syndrome     Cough variant asthma     Health Care Home     History of GI bleed 2014     ACP (advance care planning)     Shoulder pain     Internal hemorrhoids - per 2017 colonoscopy     Benign neoplasm of cecum 2017 colonoscopy - repeat 2022     Migraine without aura and without status migrainosus, not intractable     Morbid obesity (H)     Status post right hip replacement     Past Surgical History:   Procedure Laterality Date     ARTHROPLASTY HIP ANTERIOR Right 10/15/2019    Procedure: RIGHT TOTAL HIP ARTHROPLASTY DIRECT ANTERIOR APPROACH  ;  Surgeon: Simon Pitts MD;  Location:  OR     ARTHROPLASTY KNEE  4/23/2013    Procedure: ARTHROPLASTY KNEE;  RIGHT TOTAL KNEE ARTHROPLASTY (IFEOMA)^;  Surgeon: Simon Pitts MD;  Location:  OR     ARTHROPLASTY KNEE  7/17/2014    Procedure: ARTHROPLASTY KNEE;  Surgeon: Simon Pitts MD;  Location:  OR     AS REPAIR FEMORAL HERNIA,REDUCIBLE  1991    hernia repair femoral     ESOPHAGOSCOPY, GASTROSCOPY, DUODENOSCOPY (EGD), COMBINED  7/22/2014    Procedure: COMBINED ESOPHAGOSCOPY, GASTROSCOPY, DUODENOSCOPY (EGD), BIOPSY SINGLE OR MULTIPLE;  Surgeon: Natalie Gibbons MD;  Location:  GI     OSTEOTOMY FOOT Left 5/13/2019    Procedure: LEFT SECOND METATARSALPHALANGEAL CAPSULE REPAIR ;  SECOND AND THIRD METATARSAL  OSTEOTOMY;  Surgeon: Christina Durán MD;  Location: [facility] OR     PLACEMENT OF DENTAL IMPLANT(S)       TONSILLECTOMY & ADENOIDECTOMY  1966    age 6       Social History     Tobacco Use     Smoking status: Former Smoker     Packs/day: 1.00     Years: 5.00     Pack years: 5.00     Last attempt to quit: 3/20/1988     Years since quittin.6     Smokeless tobacco: Never Used   Substance Use Topics     Alcohol use: Yes     Alcohol/week: 2.0 standard drinks     Types: 2 Standard drinks or equivalent per week     Comment: 1 drink - a month     Family History   Problem Relation Age of Onset     Osteoporosis Mother      Ovarian Cancer Mother 35     Hypertension Mother      Leukemia Mother      Cancer Father         kidney     C.A.D. Father         early disease     Gout Father      Macular Degeneration Brother      Depression Daughter      Anxiety Disorder Daughter      Neurologic Disorder Maternal Grandmother         TIA's     Diabetes Maternal Grandmother      Lipids Brother      Cancer - colorectal No family hx of      Breast Cancer No family hx of      Anesthesia Reaction No family hx of      Crohn's Disease No family hx of      Ulcerative Colitis No family hx of      Colon Polyps No family hx of            Current Outpatient Medications   Medication Sig Dispense Refill     acetaminophen (TYLENOL) 500 MG tablet Take 1-2 tablets (500-1,000 mg) by mouth every 6 hours as needed for mild pain 60 tablet 0     aspirin (ASA) 325 MG EC tablet Take 1 tablet (325 mg) by mouth daily 42 tablet 0     famotidine (PEPCID) 10 MG tablet Take 1 tablet (10 mg) by mouth 2 times daily 30 tablet 0     ondansetron (ZOFRAN-ODT) 4 MG ODT tab Take 1 tablet (4 mg) by mouth every 6 hours as needed for nausea 16 tablet 0     rizatriptan (MAXALT-MLT) 10 MG ODT DISSOLVE 1 TABLET ON TONGUE AT ONSET OF HEADACHE. MAY REPEAT IN 2 HOURS. MAX 3 TABLETS PER DAY 18 tablet 3     Allergies   Allergen Reactions     Celebrex  "[Celecoxib] Nausea and Vomiting     Allopurinol Hives     Aspirin Diarrhea, Fatigue, GI Disturbance, Nausea and Nausea and Vomiting     Ibuprofen GI Disturbance     2 days of gi upset after taking any NSAID     Indomethacin      Recent Labs   Lab Test 10/15/19  0933 09/17/19  0623 07/28/18  0044 01/05/18  1034 05/17/16  1011 04/30/16  1030  04/17/13  1307   A1C  --   --   --   --   --   --   --  5.9   LDL  --   --   --  133* 112*  --   --   --    HDL  --   --   --  35* 38*  --   --   --    TRIG  --   --   --  217* 168*  --   --   --    ALT  --   --  32  --  29 46  --   --    CR 0.80 0.75 0.73  --   --  0.68   < >  --    GFRESTIMATED 81 87 82  --   --  89   < >  --    GFRESTBLACK >90 >90 >90  --   --  >90  African American GFR Calc     < >  --    POTASSIUM  --   --  3.7  --   --  4.5   < >  --    TSH  --   --   --   --  1.28  --   --   --     < > = values in this interval not displayed.        BP Readings from Last 3 Encounters:   10/30/19 128/76   10/21/19 120/64   10/16/19 99/49    Wt Readings from Last 3 Encounters:   10/30/19 120.2 kg (265 lb)   10/21/19 120.5 kg (265 lb 9.6 oz)   10/15/19 121.3 kg (267 lb 8 oz)                      ROS COMP: Constitutional, HEENT, cardiovascular, pulmonary, gi and gu systems are negative, except as otherwise noted.                Objective    /76 (BP Location: Right arm, Patient Position: Chair, Cuff Size: Adult Large)   Pulse 76   Resp 20   Ht 1.676 m (5' 6\")   Wt 120.2 kg (265 lb)   LMP 03/10/2013   BMI 42.77 kg/m    Body mass index is 42.77 kg/m .  Physical Exam       Skin:   Erythematous macular area irregularly shaped on palmar surface distal right arm approx 3x 5 inches  Similar areas on right lower abdomen.     Assessment & Plan     1. Acute gouty arthritis  Allopurinol added to allergy list  Start Uloric  I reviewed the patient information handout from Up To Date on this medication including side effects with the patient.  Discussed CV risk - she has no known " CV risk factors  RTC 2 weeks lab only  Continue on Colchicine 14 more days  - febuxostat (ULORIC) 40 MG TABS tablet; Take 1 tablet (40 mg) by mouth daily  Dispense: 30 tablet; Refill: 1  - colchicine (COLCYRS) 0.6 MG tablet; Take 1 tablet (0.6 mg) by mouth daily  Dispense: 90 tablet; Refill: 0            PAUL Smart  [facility]

## 2019-10-30 ENCOUNTER — OFFICE VISIT (OUTPATIENT)
Dept: FAMILY MEDICINE | Facility: CLINIC | Age: 59
End: 2019-10-30

## 2019-10-30 VITALS
DIASTOLIC BLOOD PRESSURE: 76 MMHG | BODY MASS INDEX: 42.59 KG/M2 | SYSTOLIC BLOOD PRESSURE: 128 MMHG | RESPIRATION RATE: 20 BRPM | HEART RATE: 76 BPM | HEIGHT: 66 IN | WEIGHT: 265 LBS

## 2019-10-30 DIAGNOSIS — M10.9 ACUTE GOUTY ARTHRITIS: Primary | ICD-10-CM

## 2019-10-30 PROCEDURE — 99213 OFFICE O/P EST LOW 20 MIN: CPT | Performed by: PHYSICIAN ASSISTANT

## 2019-10-30 ASSESSMENT — MIFFLIN-ST. JEOR: SCORE: 1798.78

## 2019-10-30 NOTE — NURSING NOTE
Questioned patient about current smoking habits.  Pt. quit smoking some time ago.  PULSE regular  My Chart: active  CLASSIFICATION OF OVERWEIGHT AND OBESITY BY BMI                        Obesity Class           BMI(kg/m2)  Underweight                                    < 18.5  Normal                                         18.5-24.9  Overweight                                     25.0-29.9  OBESITY                     I                  30.0-34.9                             II                 35.0-39.9  EXTREME OBESITY             III                >40                            Patient's  BMI Body mass index is 42.77 kg/m .  http://hin.nhlbi.nih.gov/menuplanner/menu.cgi  Pre-visit planning  Immunizations - up to date  Colonoscopy - is up to date  Mammogram - is up to date  Asthma -   PHQ9 -    VASILIY-7 -

## 2019-10-31 RX ORDER — COLCHICINE 0.6 MG/1
0.6 TABLET ORAL DAILY
Qty: 90 TABLET | Refills: 0 | Status: SHIPPED | OUTPATIENT
Start: 2019-10-31 | End: 2020-07-13

## 2019-10-31 RX ORDER — FEBUXOSTAT 40 MG/1
40 TABLET, FILM COATED ORAL DAILY
Qty: 30 TABLET | Refills: 1 | Status: SHIPPED | OUTPATIENT
Start: 2019-10-31 | End: 2020-07-13

## 2019-11-11 ENCOUNTER — HOSPITAL ENCOUNTER (OUTPATIENT)
Dept: MAMMOGRAPHY | Facility: CLINIC | Age: 59
Discharge: HOME | End: 2019-11-11
Attending: NURSE PRACTITIONER | Admitting: NURSE PRACTITIONER
Payer: COMMERCIAL

## 2019-11-11 DIAGNOSIS — Z12.31 VISIT FOR SCREENING MAMMOGRAM: ICD-10-CM

## 2019-11-11 PROCEDURE — 77063 BREAST TOMOSYNTHESIS BI: CPT

## 2019-11-20 ENCOUNTER — THERAPY VISIT (OUTPATIENT)
Dept: PHYSICAL THERAPY | Facility: CLINIC | Age: 59
End: 2019-11-20
Payer: COMMERCIAL

## 2019-11-20 DIAGNOSIS — M25.572 PAIN IN JOINT, ANKLE AND FOOT, LEFT: ICD-10-CM

## 2019-11-20 DIAGNOSIS — M79.671 RIGHT FOOT PAIN: ICD-10-CM

## 2019-11-20 PROCEDURE — 97162 PT EVAL MOD COMPLEX 30 MIN: CPT | Mod: GP | Performed by: PHYSICAL THERAPIST

## 2019-11-20 PROCEDURE — 97110 THERAPEUTIC EXERCISES: CPT | Mod: GP | Performed by: PHYSICAL THERAPIST

## 2019-11-20 NOTE — PROGRESS NOTES
[facility]: Physical Therapy Initial Evaluation   Nov 20, 2019    Subjective:   Chief Complaint: Left Achilles / Calf Pain ; Pain in both feet ; not able to walk   Pain: in the back of the calf    Numbness/Tingling: some in the foot following surgeries to the foot   Weakness: always has felt a little weak in the ankles   Stiffness: Tight in the outside of the left calf  New/Recurrent/Chronic: Chronic  DOI/onset: at least a year  Referral Date: self-referred  Mechanism of onset: no known cause, thought it was just because she was overweight  PMH/surgical history/trauma:    - Gout   - Right Hip Repalcement (10-   - Overweight   - Left foot surgery for a torn tendon (two different procedures more recently in early 2019)   - Bilateral knee replacements (2013, 2014)  General health as reported by patient: Good    Medications: Anticoagulants (reported only until 11/22/2019),   Occupation: Desk work Job duties: computer work, driving,   Previous Treatment (Effect): stretching (seemed to help)  Imaging: None recently  AM/PM: does not change throughout the day; jut worst with walking   Quality of Pain: tightness, ache,   Pain: 1/10 at present, 0/10 at best, 6/10 at worst  Worse: walking,   Better: get off the foot, elevate  Progression of Symptoms since onset: not changing   Sleeping: keeps her awake at night, can take 1-2 hours to fall asleep. Happens when she moves a lot. Sometimes takes meds for the pain.   Current Functional Status:   - walking - has to stop after 4-5 blocks   - golf - not playing golf  Previous Functional Status: No restrictions  Current HEP/exercise regimen: trying to get back into walking   Transportation to Therapy: Independent with transportation  Live with Others: 1 dog, 20 year-old daughter    Patient's goal(s): Be able to walk without pain. Walk more than 4 blocks.       Objective:    Standing Posture: bilateral pes planus    Gait: left compensated trendeleenberg    SL  Balance: Mild deficit on the left    AROM (PROM): (* indicates patient's pain)   ROM R ROM L   Plantarflexion 58 52   DF, knee straight 11 11   DF, knee bent 15 11     Strength: (* indicates patient's pain)   MMT R MMT L   DF/Inv 5 5   DF/Ev 4+ 4*   PF/Ev 4* 5*   PF/Inv  4+ **       Ankle/Foot Mobilizations (hyper vs hypo): Joint mobility normal unless otherwise noted.   - Talocrual: Hypomobile on the left  - Subtalar:  - Talonavicular:  - Calcaneocuboid:  - Cubonavicular-cuniform:    Palpation: Tenderness in the right arch, bilateral calves        Assessment/Plan:    Patient is a 59 year old female with left side ankle and both feet complaints.    Patient has the following significant findings with corresponding treatment plan.                PT Diagnosis: Bilateral Foot Pain with Altered Gait and Lower Extremity Weakness  Pain -  hot/cold therapy, manual therapy, splint/taping/bracing/orthotics, self management, education and home program  Decreased ROM/flexibility - manual therapy, therapeutic exercise, therapeutic activity and home program  Decreased joint mobility - manual therapy, therapeutic exercise, therapeutic activity and home program  Decreased strength - therapeutic exercise, therapeutic activities and home program  Impaired balance - neuro re-education, therapeutic activities and home program  Impaired gait - gait training and home program  Decreased function - therapeutic activities and home program  Impaired posture - neuro re-education, therapeutic activities and home program      Therapy Evaluation Codes:   1) History comprised of:   Personal factors that impact the plan of care:      None.    Comorbidity factors that impact the plan of care are:      Complex Surgical History (R Hip, B Knee, L Foot x2).     Medications impacting care: Heparin/coumadin.  2) Examination of Body Systems comprised of:   Body structures and functions that impact the plan of care:      Ankle, Hip and Knee.   Activity  limitations that impact the plan of care are:      Sports, Walking and Sleeping.  3) Clinical presentation characteristics are:   Evolving/Changing.  4) Decision-Making    Moderate complexity using standardized patient assessment instrument and/or measureable assessment of functional outcome.  Cumulative Therapy Evaluation is: Moderate complexity.    Previous and current functional limitations:  (See Goal Flow Sheet for this information)    Short term and Long term goals: (See Goal Flow Sheet for this information)     Communication ability:  Patient appears to be able to clearly communicate and understand verbal and written communication and follow directions correctly.  Treatment Explanation - The following has been discussed with the patient:   RX ordered/plan of care  Anticipated outcomes  Possible risks and side effects  This patient would benefit from PT intervention to resume normal activities.   Rehab potential is good.    Frequency:  1 X week, once daily  Duration:  for 8 weeks  Discharge Plan:  Achieve all LTG.  Independent in home treatment program.  Reach maximal therapeutic benefit.    Please refer to the daily flowsheet for treatment today, total treatment time and time spent performing 1:1 timed codes.

## 2019-11-21 PROBLEM — M79.671 RIGHT FOOT PAIN: Status: ACTIVE | Noted: 2019-11-21

## 2019-11-21 PROBLEM — M25.572 PAIN IN JOINT, ANKLE AND FOOT, LEFT: Status: ACTIVE | Noted: 2019-11-21

## 2019-11-29 ENCOUNTER — THERAPY VISIT (OUTPATIENT)
Dept: PHYSICAL THERAPY | Facility: CLINIC | Age: 59
End: 2019-11-29
Payer: COMMERCIAL

## 2019-11-29 DIAGNOSIS — M25.572 PAIN IN JOINT, ANKLE AND FOOT, LEFT: ICD-10-CM

## 2019-11-29 DIAGNOSIS — M79.671 RIGHT FOOT PAIN: ICD-10-CM

## 2019-11-29 PROCEDURE — 97110 THERAPEUTIC EXERCISES: CPT | Mod: GP | Performed by: PHYSICAL THERAPIST

## 2019-11-29 PROCEDURE — 97140 MANUAL THERAPY 1/> REGIONS: CPT | Mod: GP | Performed by: PHYSICAL THERAPIST

## 2019-11-29 PROCEDURE — 97112 NEUROMUSCULAR REEDUCATION: CPT | Mod: GP | Performed by: PHYSICAL THERAPIST

## 2019-12-06 ENCOUNTER — THERAPY VISIT (OUTPATIENT)
Dept: PHYSICAL THERAPY | Facility: CLINIC | Age: 59
End: 2019-12-06
Payer: COMMERCIAL

## 2019-12-06 DIAGNOSIS — M79.671 RIGHT FOOT PAIN: ICD-10-CM

## 2019-12-06 DIAGNOSIS — M25.572 PAIN IN JOINT, ANKLE AND FOOT, LEFT: ICD-10-CM

## 2019-12-06 PROCEDURE — 97110 THERAPEUTIC EXERCISES: CPT | Mod: GP | Performed by: PHYSICAL THERAPIST

## 2019-12-06 PROCEDURE — 97140 MANUAL THERAPY 1/> REGIONS: CPT | Mod: GP | Performed by: PHYSICAL THERAPIST

## 2019-12-11 ENCOUNTER — THERAPY VISIT (OUTPATIENT)
Dept: PHYSICAL THERAPY | Facility: CLINIC | Age: 59
End: 2019-12-11
Payer: COMMERCIAL

## 2019-12-11 DIAGNOSIS — M25.572 PAIN IN JOINT, ANKLE AND FOOT, LEFT: ICD-10-CM

## 2019-12-11 DIAGNOSIS — M79.671 RIGHT FOOT PAIN: ICD-10-CM

## 2019-12-11 PROCEDURE — 97110 THERAPEUTIC EXERCISES: CPT | Mod: GP | Performed by: PHYSICAL THERAPIST

## 2019-12-11 PROCEDURE — 97140 MANUAL THERAPY 1/> REGIONS: CPT | Mod: GP | Performed by: PHYSICAL THERAPIST

## 2019-12-19 ENCOUNTER — THERAPY VISIT (OUTPATIENT)
Dept: PHYSICAL THERAPY | Facility: CLINIC | Age: 59
End: 2019-12-19
Payer: COMMERCIAL

## 2019-12-19 DIAGNOSIS — M25.572 PAIN IN JOINT, ANKLE AND FOOT, LEFT: ICD-10-CM

## 2019-12-19 DIAGNOSIS — M79.671 RIGHT FOOT PAIN: ICD-10-CM

## 2019-12-19 PROCEDURE — 97140 MANUAL THERAPY 1/> REGIONS: CPT | Mod: GP | Performed by: PHYSICAL THERAPIST

## 2019-12-19 PROCEDURE — 97112 NEUROMUSCULAR REEDUCATION: CPT | Mod: GP | Performed by: PHYSICAL THERAPIST

## 2019-12-27 ENCOUNTER — THERAPY VISIT (OUTPATIENT)
Dept: PHYSICAL THERAPY | Facility: CLINIC | Age: 59
End: 2019-12-27
Payer: COMMERCIAL

## 2019-12-27 DIAGNOSIS — M79.671 RIGHT FOOT PAIN: ICD-10-CM

## 2019-12-27 DIAGNOSIS — M25.572 PAIN IN JOINT, ANKLE AND FOOT, LEFT: ICD-10-CM

## 2019-12-27 PROCEDURE — 97140 MANUAL THERAPY 1/> REGIONS: CPT | Mod: GP | Performed by: PHYSICAL THERAPIST

## 2019-12-27 PROCEDURE — 97110 THERAPEUTIC EXERCISES: CPT | Mod: GP | Performed by: PHYSICAL THERAPIST

## 2020-01-03 ENCOUNTER — THERAPY VISIT (OUTPATIENT)
Dept: PHYSICAL THERAPY | Facility: CLINIC | Age: 60
End: 2020-01-03
Payer: COMMERCIAL

## 2020-01-03 DIAGNOSIS — M79.671 RIGHT FOOT PAIN: ICD-10-CM

## 2020-01-03 DIAGNOSIS — M25.572 PAIN IN JOINT, ANKLE AND FOOT, LEFT: ICD-10-CM

## 2020-01-03 PROCEDURE — 97110 THERAPEUTIC EXERCISES: CPT | Mod: GP | Performed by: PHYSICAL THERAPIST

## 2020-01-03 PROCEDURE — 97140 MANUAL THERAPY 1/> REGIONS: CPT | Mod: GP | Performed by: PHYSICAL THERAPIST

## 2020-01-07 ENCOUNTER — TRANSFERRED RECORDS (OUTPATIENT)
Dept: FAMILY MEDICINE | Facility: CLINIC | Age: 60
End: 2020-01-07

## 2020-01-20 ENCOUNTER — THERAPY VISIT (OUTPATIENT)
Dept: PHYSICAL THERAPY | Facility: CLINIC | Age: 60
End: 2020-01-20
Payer: COMMERCIAL

## 2020-01-20 DIAGNOSIS — M79.671 RIGHT FOOT PAIN: ICD-10-CM

## 2020-01-20 DIAGNOSIS — M25.572 PAIN IN JOINT, ANKLE AND FOOT, LEFT: ICD-10-CM

## 2020-01-20 PROCEDURE — 97112 NEUROMUSCULAR REEDUCATION: CPT | Mod: GP | Performed by: PHYSICAL THERAPIST

## 2020-01-20 PROCEDURE — 97140 MANUAL THERAPY 1/> REGIONS: CPT | Mod: GP | Performed by: PHYSICAL THERAPIST

## 2020-01-20 NOTE — LETTER
"KAREN LANDRY QUOC PT  [address]  [facility] [address]  [phone]    2020    Re: Rupal Puri   :   1960  MRN:  [id number]   REFERRING PHYSICIAN:   Simon LANDRY QUOC PT  Date of Initial Evaluation:  19  Visits:  Rxs Used: 8  Reason for Referral:     Pain in joint, ankle and foot, left  Right foot pain    PROGRESS  REPORT  Progress reporting period is from 2019 to 2020.       SUBJECTIVE  Subjective changes noted by patient:  Left more than right foot. Can feel it when she stretches/exercises further up the lower extremity (such as with the bridge). Was able to go up stairs last night walking foot over foot. Usually goes one foot at a time to prevent pain. Has a lot of tension in the front of the right hip. Feet are getting better (fewer cramps in the side of the left leg). Feels that she has less pain in \"so many places\".   Current pain level is 2/10 in the feet. Hips are okay for now.     Previous pain level was  6/10  .   Changes in function:  Yes (See Goal flowsheet attached for changes in current functional level)  Adverse reaction to treatment or activity: activity - Increased pain with weightbearing/walking    OBJECTIVE  Changes noted in objective findings:  Yes, Patient has made improvements with ankle ROM. Pes planus on the right improved more than the left.     Standing Posture: Pes planus mild on right, moderate-major on left    Gait: compensated trendelenberg, right more than left. Improved from initial visit    Ankle AROM (PROM): (* indicates patient's pain)   ROM R ROM L   DF, knee straight 14 11   DF, knee bent  19     Ankle/Foot Mobilizations (hyper vs hypo): Joint mobility normal unless otherwise noted.   - Talocrual: Mild on the left    Palpation: Tenderness at the right fibular head, right IT band, and right vastus lateralis today.   Re: Rupal Puri   :   1960      ASSESSMENT/PLAN  Updated problem " list and treatment plan: Diagnosis 1:  Bilateral Foot Pain with Altered Gait and Lower Extremity Weakness  Pain -  hot/cold therapy, manual therapy, splint/taping/bracing/orthotics, self management, education and home program  Decreased ROM/flexibility - manual therapy, therapeutic exercise, therapeutic activity and home program  Decreased joint mobility - manual therapy, therapeutic exercise, therapeutic activity and home program  Decreased strength - therapeutic exercise, therapeutic activities and home program  Impaired balance - neuro re-education, therapeutic activities and home program  Impaired gait - gait training and home program  Decreased function - therapeutic activities and home program  Impaired posture - neuro re-education, therapeutic activities and home program    STG/LTGs have been met or progress has been made towards goals:  Yes (See Goal flow sheet completed today.)  Assessment of Progress: The patient's condition is improving.  The patient's condition has potential to improve.  Patient is meeting short term goals and is progressing towards long term goals.  Self Management Plans:  Patient has been instructed in a home treatment program.  Patient  has been instructed in self management of symptoms.  I have re-evaluated this patient and find that the nature, scope, duration and intensity of the therapy is appropriate for the medical condition of the patient.  Rupal continues to require the following intervention to meet STG and LTG's:  PT    Recommendations:  Patient has an order for her hip/lower extremities. We will close this self-referred episode for her feet and open up a larger eval based on the orders focus on her hips which are believed to be the primary cause of the remaining symptoms/limitations.     Thank you for your referral.    INQUIRIES  Therapist: PASCUAL Thibodeaux [facility]  [address] [address] [place] [id number]  Phone: [phone]  Fax: [phone]

## 2020-01-20 NOTE — PROGRESS NOTES
"DISCHARGE REPORT    Progress reporting period is from Nov 20, 2019 to Jan 20, 2020.         SUBJECTIVE  Subjective changes noted by patient:  Left more than right foot. Can feel it when she stretches/exercises further up the lower extremity (such as with the bridge). Was able to go up stairs last night walking foot over foot. Usually goes one foot at a time to prevent pain. Has a lot of tension in the front of the right hip. Feet are getting better (fewer cramps in the side of the left leg). Feels that she has less pain in \"so many places\".   Current pain level is 2/10 in the feet. Hips are okay for now.     Previous pain level was  6/10  .   Changes in function:  Yes (See Goal flowsheet attached for changes in current functional level)  Adverse reaction to treatment or activity: activity - Increased pain with weightbearing/walking    OBJECTIVE  Changes noted in objective findings:  Yes, Patient has made improvements with ankle ROM. Pes planus on the right improved more than the left.     Standing Posture: Pes planus mild on right, moderate-major on left    Gait: compensated trendelenberg, right more than left. Improved from initial visit    Ankle AROM (PROM): (* indicates patient's pain)   ROM R ROM L   DF, knee straight 14 11   DF, knee bent 20 19       Ankle/Foot Mobilizations (hyper vs hypo): Joint mobility normal unless otherwise noted.   - Talocrual: Mild on the left    Palpation: Tenderness at the right fibular head, right IT band, and right vastus lateralis today.           ASSESSMENT/PLAN  Updated problem list and treatment plan: Diagnosis 1:  Bilateral Foot Pain with Altered Gait and Lower Extremity Weakness  Pain -  hot/cold therapy, manual therapy, splint/taping/bracing/orthotics, self management, education and home program  Decreased ROM/flexibility - manual therapy, therapeutic exercise, therapeutic activity and home program  Decreased joint mobility - manual therapy, therapeutic exercise, therapeutic " activity and home program  Decreased strength - therapeutic exercise, therapeutic activities and home program  Impaired balance - neuro re-education, therapeutic activities and home program  Impaired gait - gait training and home program  Decreased function - therapeutic activities and home program  Impaired posture - neuro re-education, therapeutic activities and home program    STG/LTGs have been met or progress has been made towards goals:  Yes (See Goal flow sheet completed today.)  Assessment of Progress: The patient's condition is improving.  The patient's condition has potential to improve.  Patient is meeting short term goals and is progressing towards long term goals.  Self Management Plans:  Patient has been instructed in a home treatment program.  Patient  has been instructed in self management of symptoms.  I have re-evaluated this patient and find that the nature, scope, duration and intensity of the therapy is appropriate for the medical condition of the patient.  Rupal continues to require the following intervention to meet STG and LTG's:  PT    Recommendations:  Patient has an order for her hip/lower extremities. We will close this self-referred episode for her feet and open up a larger eval based on the orders focus on her hips which are believed to be the primary cause of the remaining symptoms/limitations.     Please refer to the daily flowsheet for treatment today, total treatment time and time spent performing 1:1 timed codes.

## 2020-01-30 NOTE — PROGRESS NOTES
"[facility]: Physical Therapy Initial Evaluation   Jan 31, 2020  Precautions/Restrictions/Physician instructions:   Per Orders:   11/20/2019: 4-6 visits. Evaluate and treat patient as needed and initiate a home exercise program. Back stabilization/strengthening, lower extremity and knee strengthening/stabilization/modalities as indicated (avoid any treatment of right hip total hip until a minimum of eiht weeks post-op, will add orders later).   12/18/2019: additional orders with 6-8 visits.       Subjective:   Chief Complaint: Bilateral hip pain ; S/P Right Total Hip Arthroplasty ; Bilateral knee pain   Pain: all around the fronts of both knees. Feels deep in the knee. Also pain in the right thigh and hip. The left hip is painful sometimes, but not all the time. Pain in both hips is in the front of the hip.    Numbness/Tingling: numbness in the right thigh where the surgery was   Weakness: weakness in both lower extremities.    Stiffness: a little in the hips and knees.   New/Recurrent/Chronic: Chronic  DOI/onset: years   DOS: Oct 15, 2019  Referral Date: 11/20/2019 - Dr. Simon Blancas  [facility]/surgical history/trauma:                - Gout               - Right Hip Repalcement                - Overweight               - Left foot surgery for a torn tendon (two different procedures more recently in early 2019)               - Bilateral knee replacements (2013, 2014)  General health as reported by patient: Good    Medications: Magnesium, tumeric, and tart cherry/celery.   Occupation: Desk work Job duties: computer work, driving,   AM/PM: Depends on the day, thinks it depends on what she eats.   Quality of Pain: Knees - achy, stiff ; Hips - \"painful tension\" (right), sharp stabbing (left)  Pain: 4/10 at present, 1/10 at best, 6/10 at worst  Worse: pickleball, walking, standing up, laying on the right side, moving after sitting for a while,   Better: focus on self cares, exercises, shake her legs out, "   Progression of Symptoms since onset: ups and down    Sleeping: wakes up every 30 minutes roughly. Wakes at least 5 times per night.    Current Functional Status:   - sitting - pain with sitting in the right hip, knees stiffen up with about 10 minutes of sitting   - standing up - increased pain with standing up  - pickleball - can play for about 5 minutes  - walking - increased pain with walking into work   - getting in.out of vehicles - has to help lift her right leg  Current HEP/exercise regimen: PT exercises  Transportation to Therapy: Independent with transportation  Live with Others: 1 dog, 20 year-old daughter    Patient's goal(s): Be able to walk without pain. Walk more than 4 blocks.       Objective:    Gait: Antalgic gait with shortened steps and increased [elvic rotation bilaterally    HIP: (* indicates patient's pain)   ROM R ROM L MMT R MMT L   Flexion 118 90* 5 5   IR 28 10*     ER 28 48     abduction 26* 24* 5 4   Extension tight WNL 5 5   KNEE       Hyper-Ext 5 8     Ext 0 0 5 5   Flx 129 138 5 5       Palpation: tenderness to palpation in the anterior and lateral aspects of the right thigh and anterior hip.    Other:   -Ely's positive on the right        Assessment/Plan:    Patient is a 59 year old female with both sides hip and both sides knee complaints.    Patient has the following significant findings with corresponding treatment julius.   Referring Diagnosis: Bilateral lower extremity pain ; S/P Right total hip arthroplasty ; left hip DJD  Pain -  hot/cold therapy, manual therapy, splint/taping/bracing/orthotics, self management, education and home program  Decreased ROM/flexibility - manual therapy, therapeutic exercise, therapeutic activity and home program  Decreased strength - therapeutic exercise, therapeutic activities and home program  Impaired gait - gait training and home program  Decreased function - therapeutic activities and home program      Therapy Evaluation Codes:   1) History  comprised of:   Personal factors that impact the plan of care:      Profession and Time since onset of symptoms.    Comorbidity factors that impact the plan of care are:      Osteoarthritis, Overweight and Multiple lower extremity surgeries.     Medications impacting care: None.  2) Examination of Body Systems comprised of:   Body structures and functions that impact the plan of care:      Ankle, Hip and Knee.   Activity limitations that impact the plan of care are:      Sitting, Sports, Standing, Sleeping and In/Out of a vehicle.  3) Clinical presentation characteristics are:   Evolving/Changing.  4) Decision-Making    Moderate complexity using standardized patient assessment instrument and/or measureable assessment of functional outcome.  Cumulative Therapy Evaluation is: Moderate complexity.    Previous and current functional limitations:  (See Goal Flow Sheet for this information)    Short term and Long term goals: (See Goal Flow Sheet for this information)     Communication ability:  Patient appears to be able to clearly communicate and understand verbal and written communication and follow directions correctly.  Treatment Explanation - The following has been discussed with the patient:   RX ordered/plan of care  Anticipated outcomes  Possible risks and side effects  This patient would benefit from PT intervention to resume normal activities.   Rehab potential is good.    Frequency:  2 X a month, once daily  Duration:  for 4 months  Discharge Plan:  Achieve all LTG.  Independent in home treatment program.  Reach maximal therapeutic benefit.    Please refer to the daily flowsheet for treatment today, total treatment time and time spent performing 1:1 timed codes.

## 2020-01-31 ENCOUNTER — THERAPY VISIT (OUTPATIENT)
Dept: PHYSICAL THERAPY | Facility: CLINIC | Age: 60
End: 2020-01-31
Payer: COMMERCIAL

## 2020-01-31 DIAGNOSIS — M79.605 BILATERAL LOWER EXTREMITY PAIN: ICD-10-CM

## 2020-01-31 DIAGNOSIS — M79.604 BILATERAL LOWER EXTREMITY PAIN: ICD-10-CM

## 2020-01-31 DIAGNOSIS — Z96.641 STATUS POST RIGHT HIP REPLACEMENT: Primary | ICD-10-CM

## 2020-01-31 PROCEDURE — 97140 MANUAL THERAPY 1/> REGIONS: CPT | Mod: GP | Performed by: PHYSICAL THERAPIST

## 2020-01-31 PROCEDURE — 97162 PT EVAL MOD COMPLEX 30 MIN: CPT | Mod: GP | Performed by: PHYSICAL THERAPIST

## 2020-01-31 ASSESSMENT — ACTIVITIES OF DAILY LIVING (ADL)
WALKING_INITIALLY: SLIGHT DIFFICULTY
HOS_ADL_ITEM_SCORE_TOTAL: 30
PUTTING_ON_SOCKS_AND_SHOES: SLIGHT DIFFICULTY
ROLLING_OVER_IN_BED: SLIGHT DIFFICULTY
GOING_UP_1_FLIGHT_OF_STAIRS: MODERATE DIFFICULTY
STANDING_FOR_15_MINUTES: SLIGHT DIFFICULTY
HOW_WOULD_YOU_RATE_YOUR_CURRENT_LEVEL_OF_FUNCTION_DURING_YOUR_USUAL_ACTIVITIES_OF_DAILY_LIVING_FROM_0_TO_100_WITH_100_BEING_YOUR_LEVEL_OF_FUNCTION_PRIOR_TO_YOUR_HIP_PROBLEM_AND_0_BEING_THE_INABILITY_TO_PERFORM_ANY_OF_YOUR_USUAL_DAILY_ACTIVITIES?: 70
STEPPING_UP_AND_DOWN_CURBS: SLIGHT DIFFICULTY
HOS_ADL_COUNT: 11
GETTING_INTO_AND_OUT_OF_AN_AVERAGE_CAR: SLIGHT DIFFICULTY
SITTING_FOR_15_MINUTES: SLIGHT DIFFICULTY
WALKING_APPROXIMATELY_10_MINUTES: SLIGHT DIFFICULTY
HOS_ADL_HIGHEST_POTENTIAL_SCORE: 44
TWISTING/PIVOTING_ON_INVOLVED_LEG: MODERATE DIFFICULTY
GOING_DOWN_1_FLIGHT_OF_STAIRS: MODERATE DIFFICULTY
RECREATIONAL_ACTIVITIES: SLIGHT DIFFICULTY
LIGHT_TO_MODERATE_WORK: SLIGHT DIFFICULTY

## 2020-01-31 NOTE — LETTER
[facility]  [address]  [address]  [place] [address]  [phone]    February 10, 2020    Re: Rupal Puri   :   1960  MRN:  [id number]   REFERRING PHYSICIAN:   Simon LANDRY ODESSANIDA PT    Date of Initial Evaluation:  2020  Visits:  Rxs Used: 1  Reason for Referral:     Status post right hip replacement  Bilateral lower extremity pain    [facility]: Physical Therapy Initial Evaluation   2020  Precautions/Restrictions/Physician instructions:   Per Orders:   2019: 4-6 visits. Evaluate and treat patient as needed and initiate a home exercise program. Back stabilization/strengthening, lower extremity and knee strengthening/stabilization/modalities as indicated (avoid any treatment of right hip total hip until a minimum of eiht weeks post-op, will add orders later).   2019: additional orders with 6-8 visits.   Subjective:   Chief Complaint: Bilateral hip pain ; S/P Right Total Hip Arthroplasty ; Bilateral knee pain   Pain: all around the fronts of both knees. Feels deep in the knee. Also pain in the right thigh and  hip. The left hip is painful sometimes, but not all the time. Pain in both hips is in the front of the          hip.    Numbness/Tingling: numbness in the right thigh where the surgery was   Weakness: weakness in both lower extremities.    Stiffness: a little in the hips and knees.   New/Recurrent/Chronic: Chronic  DOI/onset: years   DOS: Oct 15, 2019  Referral Date: 2019 - Dr. Simon Blancas  PMH/surgical history/trauma:                - Gout  Re: Rupal Puri   :   1960                - Right Hip Repalcement                 - Overweight                - Left foot surgery for a torn tendon (two different procedures more recently in early )                - Bilateral knee replacements (, )  General health as reported by patient: Good    Medications: Magnesium, tumeric, and tart  "cherry/celery.   Occupation: Desk work Job duties: computer work, driving,   AM/PM: Depends on the day, thinks it depends on what she eats.   Quality of Pain: Knees - achy, stiff ; Hips - \"painful tension\" (right), sharp stabbing (left)  Pain: 4/10 at present, 1/10 at best, 6/10 at worst  Worse: pickleball, walking, standing up, laying on the right side, moving after sitting for a while,   Better: focus on self cares, exercises, shake her legs out,   Progression of Symptoms since onset: ups and down    Sleeping: wakes up every 30 minutes roughly. Wakes at least 5 times per night.    Current Functional Status:   - sitting - pain with sitting in the right hip, knees stiffen up with about 10 minutes of sitting   - standing up - increased pain with standing up  - pickleball - can play for about 5 minutes  - walking - increased pain with walking into work   - getting in.out of vehicles - has to help lift her right leg  Current HEP/exercise regimen: PT exercises  Transportation to Therapy: Independent with transportation  Live with Others: 1 dog, 20 year-old daughter  Patient's goal(s): Be able to walk without pain. Walk more than 4 blocks.   Objective:  Gait: Antalgic gait with shortened steps and increased [elvic rotation bilaterally  HIP: (* indicates patient's pain)   ROM R ROM L MMT R MMT L   Flexion 118 90* 5 5   IR 28 10*     ER 28 48     abduction 26* 24* 5 4   Extension tight WNL 5 5   KNEE       Hyper-Ext 5 8     Ext 0 0 5 5   Flx 129 138 5 5   Re: Rupal Puri   :   1960    Palpation: tenderness to palpation in the anterior and lateral aspects of the right thigh and anterior hip.    Other:   -Ely's positive on the right    Assessment/Plan:    Patient is a 59 year old female with both sides hip and both sides knee complaints.    Patient has the following significant findings with corresponding treatment julius.   Referring Diagnosis: Bilateral lower extremity pain ; S/P Right total hip arthroplasty ; " left hip DJD  Pain -  hot/cold therapy, manual therapy, splint/taping/bracing/orthotics, self management, education and home program  Decreased ROM/flexibility - manual therapy, therapeutic exercise, therapeutic activity and home program  Decreased strength - therapeutic exercise, therapeutic activities and home program  Impaired gait - gait training and home program  Decreased function - therapeutic activities and home program    Therapy Evaluation Codes:   1) History comprised of:   Personal factors that impact the plan of care:      Profession and Time since onset of symptoms.    Comorbidity factors that impact the plan of care are:      Osteoarthritis, Overweight and Multiple lower extremity surgeries.     Medications impacting care: None.  2) Examination of Body Systems comprised of:   Body structures and functions that impact the plan of care:      Ankle, Hip and Knee.   Activity limitations that impact the plan of care are:      Sitting, Sports, Standing, Sleeping and In/Out of a vehicle.  3) Clinical presentation characteristics are:   Evolving/Changing.  4) Decision-Making    Moderate complexity using standardized patient assessment instrument and/or   measureable assessment of functional outcome.  Cumulative Therapy Evaluation is: Moderate complexity.    Previous and current functional limitations:  (See Goal Flow Sheet for this information)    Short term and Long term goals: (See Goal Flow Sheet for this information)     Communication ability:  Patient appears to be able to clearly communicate and understand verbal and written communication and follow directions correctly.  Treatment Explanation - The following has been discussed with the patient:   RX ordered/plan of care    Anticipated outcomes  Possible risks and side effects  This patient would benefit from PT intervention to resume normal activities.   Rehab potential is good.  Frequency:  2 X a month, once daily  Duration:  for 4 months  Discharge  Plan:  Achieve all LTG.  Independent in home treatment program.  Reach maximal therapeutic benefit.    Re: Rupal WILSON Jaxson   :   1960      Thank you for your referral.    [facility]  Therapist: Winston Yates DPT  [id number] [address] [address]  Phone: [phone]  Fax: [phone]

## 2020-02-09 PROBLEM — M79.671 RIGHT FOOT PAIN: Status: RESOLVED | Noted: 2019-11-21 | Resolved: 2020-02-09

## 2020-02-09 PROBLEM — M79.605 BILATERAL LOWER EXTREMITY PAIN: Status: ACTIVE | Noted: 2020-02-09

## 2020-02-09 PROBLEM — M25.572 PAIN IN JOINT, ANKLE AND FOOT, LEFT: Status: RESOLVED | Noted: 2019-11-21 | Resolved: 2020-02-09

## 2020-02-09 PROBLEM — M79.604 BILATERAL LOWER EXTREMITY PAIN: Status: ACTIVE | Noted: 2020-02-09

## 2020-02-14 ENCOUNTER — THERAPY VISIT (OUTPATIENT)
Dept: PHYSICAL THERAPY | Facility: CLINIC | Age: 60
End: 2020-02-14
Payer: COMMERCIAL

## 2020-02-14 DIAGNOSIS — M79.605 BILATERAL LOWER EXTREMITY PAIN: ICD-10-CM

## 2020-02-14 DIAGNOSIS — M79.604 BILATERAL LOWER EXTREMITY PAIN: ICD-10-CM

## 2020-02-14 PROCEDURE — 97140 MANUAL THERAPY 1/> REGIONS: CPT | Mod: GP | Performed by: PHYSICAL THERAPIST

## 2020-02-14 PROCEDURE — 97110 THERAPEUTIC EXERCISES: CPT | Mod: GP | Performed by: PHYSICAL THERAPIST

## 2020-03-06 ENCOUNTER — THERAPY VISIT (OUTPATIENT)
Dept: PHYSICAL THERAPY | Facility: CLINIC | Age: 60
End: 2020-03-06
Payer: COMMERCIAL

## 2020-03-06 DIAGNOSIS — M79.605 BILATERAL LOWER EXTREMITY PAIN: ICD-10-CM

## 2020-03-06 DIAGNOSIS — M79.604 BILATERAL LOWER EXTREMITY PAIN: ICD-10-CM

## 2020-03-06 PROCEDURE — 97110 THERAPEUTIC EXERCISES: CPT | Mod: GP | Performed by: PHYSICAL THERAPIST

## 2020-03-06 PROCEDURE — 97140 MANUAL THERAPY 1/> REGIONS: CPT | Mod: GP | Performed by: PHYSICAL THERAPIST

## 2020-04-02 ENCOUNTER — TELEPHONE (OUTPATIENT)
Dept: PHYSICAL THERAPY | Facility: CLINIC | Age: 60
End: 2020-04-02

## 2020-04-02 DIAGNOSIS — M79.605 BILATERAL LOWER EXTREMITY PAIN: ICD-10-CM

## 2020-04-02 DIAGNOSIS — M79.604 BILATERAL LOWER EXTREMITY PAIN: ICD-10-CM

## 2020-04-02 NOTE — TELEPHONE ENCOUNTER
Called to check in on patient.    Inform the patient that the clinic is scheduled to reopen on May 4th at this time.     Patient reports improvement with walking and stairs. Will continue to work with exercises until clinic reopens.

## 2020-07-13 ENCOUNTER — OFFICE VISIT (OUTPATIENT)
Dept: FAMILY MEDICINE | Facility: CLINIC | Age: 60
End: 2020-07-13

## 2020-07-13 VITALS
OXYGEN SATURATION: 97 % | TEMPERATURE: 98.1 F | WEIGHT: 293 LBS | BODY MASS INDEX: 48.74 KG/M2 | HEART RATE: 84 BPM | DIASTOLIC BLOOD PRESSURE: 62 MMHG | SYSTOLIC BLOOD PRESSURE: 138 MMHG

## 2020-07-13 DIAGNOSIS — R10.11 RUQ ABDOMINAL PAIN: Primary | ICD-10-CM

## 2020-07-13 PROCEDURE — 99213 OFFICE O/P EST LOW 20 MIN: CPT | Performed by: PHYSICIAN ASSISTANT

## 2020-07-13 RX ORDER — MULTIVITAMIN WITH IRON
1 TABLET ORAL DAILY
COMMUNITY
End: 2022-12-20

## 2020-07-13 RX ORDER — VIT C/B6/B5/MAGNESIUM/HERB 173 50-5-6-5MG
CAPSULE ORAL
COMMUNITY
End: 2021-10-12

## 2020-07-13 NOTE — PROGRESS NOTES
CC: Side pain    History:  2 weeks ago, uRpal started noticing gradually worsening of right lateral chest pain. Seems to be gradually worse not better. Pain is relatively constant. Does not seem to have any correlation with eating. Does admit that she has been gaining weight since February where she was 260 lbs. Admits that she has been stressed, and has been doing some more snacking. Has noticed that she is not sleeping, due to having so much work to do. Working 60 hours per week. Feels like work-from home set up is more ergonomic, so not thinking that this is due to work space set up with work from home. Did get a massage of the area last night, with mild relief.     PMH, MEDICATIONS, ALLERGIES, SOCIAL AND FAMILY HISTORY in EPIC and reviewed by me personally.    ROS negative other than the symptoms noted above in the HPI.      Examination   /62 (BP Location: Left arm, Patient Position: Sitting, Cuff Size: Adult Large)   Pulse 84   Temp 98.1  F (36.7  C) (Oral)   Wt 137 kg (302 lb)   LMP 03/10/2013   SpO2 97%   BMI 48.74 kg/m       Constitutional: Sitting comfortably, in no acute distress. Vital signs noted  Neck:  no adenopathy, trachea midline and normal to palpation  Cardiovascular:  regular rate and rhythm, no murmurs, clicks, or gallops  Respiratory:  normal respiratory rate and rhythm, lungs clear to auscultation  Abdomen: Abdomen soft. Palacios sign partial positive. BS normal. No masses, organomegaly  M/S: Tender to palpation over right lateral ribs 8-10.   SKIN: No jaundice/pallor/rash.   Psychiatric: mentation appears normal and affect normal/bright      A/P    ICD-10-CM    1. RUQ abdominal pain  R10.11 RADIOLOGY REFERRAL     US Abdomen Limited       DISCUSSION:  Explained to Rupal that the fact that her symptoms can be recreated with relatively superficial palpation over ribs, suggest more of a muscle strain that can take 2-8 weeks to heel. However, I further explained that we cannot rule out a  possible cholecystitis without an US. She would like to monitor further, and try heating, but she will proceed to have US that I will order today if symptoms not improving. Recommended ER with significant worsening.     follow up visit: As needed    [facility]

## 2020-07-13 NOTE — NURSING NOTE
Rupal is here for back pain URQ     Pre-visit Screening:  Immunizations:  up to date  Colonoscopy:  is up to date  Mammogram: is up to date  Asthma Action Test/Plan:  NA  PHQ9:  NA  GAD7:  NA  Questioned patient about current smoking habits Pt. quit smoking some time ago.  Ok to leave detailed message on voice mail for today's visit only Yes, phone # [phone]

## 2020-07-27 ENCOUNTER — TELEPHONE (OUTPATIENT)
Dept: FAMILY MEDICINE | Facility: CLINIC | Age: 60
End: 2020-07-27

## 2020-07-27 NOTE — TELEPHONE ENCOUNTER
Patient called to say she got her results on My Chart but that it doesn't mean anything to her.  She is still having a lot of pain so she would like to discuss the results with you.      Patient's phone #[phone]

## 2020-07-28 NOTE — TELEPHONE ENCOUNTER
Called and spoke to Rupal. Her symptoms are stable. Informed her of normal US. She is okay with monitoring for another 2 weeks for likely rib muscle strain, but will contact me sooner if worsening.

## 2020-08-24 PROBLEM — M79.605 BILATERAL LOWER EXTREMITY PAIN: Status: RESOLVED | Noted: 2020-02-09 | Resolved: 2020-08-24

## 2020-08-24 PROBLEM — M79.604 BILATERAL LOWER EXTREMITY PAIN: Status: RESOLVED | Noted: 2020-02-09 | Resolved: 2020-08-24

## 2020-08-24 NOTE — PROGRESS NOTES
DISCHARGE  REPORT    Progress reporting period is from Jan 31, 2020 to Mar 6, 2020.       SUBJECTIVE  Subjective changes noted by patient:  Patient communicated via [address] that she is doing well. She is going to continue with her home program and hold off on any further PT at this time.   Initial Pain level: 6/10.   Changes in function:  Yes, per patient report. Specifics not obtained  Adverse reaction to treatment or activity: None    OBJECTIVE  Changes noted in objective findings:  Patient has failed to return to therapy so current objective findings are unknown.      ASSESSMENT/PLAN  Updated problem list and treatment plan: Diagnosis 1:  Bilateral lower extremity pain ; S/P Right total hip arthroplasty ; left hip DJD  STG/LTGs have been met or progress has been made towards goals:  Yes, but specifics not obtained  Assessment of Progress: The patient's condition is improving.  Self Management Plans:  Patient has been instructed in a home treatment program.  Patient  has been instructed in self management of symptoms.  Rupal continues to require the following intervention to meet STG and LTG's:  PT intervention is no longer required to meet STG/LTG.    Recommendations:  This patient is ready to be discharged from therapy and continue their home treatment program.    Please refer to the daily flowsheet for treatment today, total treatment time and time spent performing 1:1 timed codes.

## 2020-11-11 ENCOUNTER — OFFICE VISIT (OUTPATIENT)
Dept: FAMILY MEDICINE | Facility: CLINIC | Age: 60
End: 2020-11-11

## 2020-11-11 VITALS
HEART RATE: 72 BPM | DIASTOLIC BLOOD PRESSURE: 60 MMHG | WEIGHT: 293 LBS | SYSTOLIC BLOOD PRESSURE: 124 MMHG | HEIGHT: 66 IN | BODY MASS INDEX: 47.09 KG/M2 | TEMPERATURE: 97.7 F | OXYGEN SATURATION: 98 %

## 2020-11-11 DIAGNOSIS — E78.2 MIXED HYPERLIPIDEMIA: ICD-10-CM

## 2020-11-11 DIAGNOSIS — M10.9 ACUTE GOUTY ARTHRITIS: ICD-10-CM

## 2020-11-11 DIAGNOSIS — E66.01 MORBID OBESITY (H): ICD-10-CM

## 2020-11-11 DIAGNOSIS — Z00.00 ROUTINE GENERAL MEDICAL EXAMINATION AT A HEALTH CARE FACILITY: Primary | ICD-10-CM

## 2020-11-11 DIAGNOSIS — K64.8 INTERNAL HEMORRHOIDS: ICD-10-CM

## 2020-11-11 DIAGNOSIS — Z96.641 STATUS POST RIGHT HIP REPLACEMENT: ICD-10-CM

## 2020-11-11 DIAGNOSIS — D12.0 BENIGN NEOPLASM OF CECUM: ICD-10-CM

## 2020-11-11 DIAGNOSIS — E88.810 METABOLIC SYNDROME: ICD-10-CM

## 2020-11-11 DIAGNOSIS — Z87.19 HISTORY OF GI BLEED: ICD-10-CM

## 2020-11-11 DIAGNOSIS — G43.009 MIGRAINE WITHOUT AURA AND WITHOUT STATUS MIGRAINOSUS, NOT INTRACTABLE: ICD-10-CM

## 2020-11-11 LAB
CHOLEST SERPL-MCNC: 197 MG/DL (ref 0–199)
CHOLEST/HDLC SERPL: 4 {RATIO} (ref 0–5)
GLUCOSE SERPL-MCNC: 106 MG/DL (ref 60–99)
HDLC SERPL-MCNC: 44 MG/DL (ref 40–150)
LDLC SERPL CALC-MCNC: 117 MG/DL (ref 0–130)
TRIGL SERPL-MCNC: 179 MG/DL (ref 0–149)

## 2020-11-11 PROCEDURE — 84550 ASSAY OF BLOOD/URIC ACID: CPT | Mod: 90 | Performed by: PHYSICIAN ASSISTANT

## 2020-11-11 PROCEDURE — 36415 COLL VENOUS BLD VENIPUNCTURE: CPT | Performed by: PHYSICIAN ASSISTANT

## 2020-11-11 PROCEDURE — 90471 IMMUNIZATION ADMIN: CPT | Performed by: PHYSICIAN ASSISTANT

## 2020-11-11 PROCEDURE — 90686 IIV4 VACC NO PRSV 0.5 ML IM: CPT | Performed by: PHYSICIAN ASSISTANT

## 2020-11-11 PROCEDURE — 99396 PREV VISIT EST AGE 40-64: CPT | Mod: 25 | Performed by: PHYSICIAN ASSISTANT

## 2020-11-11 PROCEDURE — 82947 ASSAY GLUCOSE BLOOD QUANT: CPT | Performed by: PHYSICIAN ASSISTANT

## 2020-11-11 PROCEDURE — 80061 LIPID PANEL: CPT | Performed by: PHYSICIAN ASSISTANT

## 2020-11-11 ASSESSMENT — MIFFLIN-ST. JEOR: SCORE: 1939.16

## 2020-11-11 NOTE — PROGRESS NOTES
SUBJECTIVE:   CC: Rupal Puri is an 60 year old woman who presents for preventive health visit.       Patient has been advised of split billing requirements and indicates understanding: Yes     Healthy Habits:    Do you get at least three servings of calcium containing foods daily (dairy, green leafy vegetables, etc.)? yes    Amount of exercise or daily activities, outside of work: walking dog    Problems taking medications regularly No    Medication side effects: No    Have you had an eye exam in the past two years? yes    Do you see a dentist twice per year? yes    Do you have sleep apnea, excessive snoring or daytime drowsiness?yes CPAP     [facility]      REVIEW     Gout - last one with Surgery  Hives with allopurinol.       Lyme disease - Dr. Berman in [place]      Wt Readings from Last 5 Encounters:   20 135.5 kg (298 lb 12.8 oz)   20 137 kg (302 lb)   10/30/19 120.2 kg (265 lb)   10/21/19 120.5 kg (265 lb 9.6 oz)   10/15/19 121.3 kg (267 lb 8 oz)           Migraine     No more migraines since Lyme therapy    Rare if weather changes      Today's PHQ-2 Score:   PHQ-2 (  [employer]) 2018 7/10/2014   Q1: Little interest or pleasure in doing things 0 0   Q2: Feeling down, depressed or hopeless 0 0   PHQ-2 Score 0 0       Abuse: Current or Past(Physical, Sexual or Emotional)- No  Do you feel safe in your environment? Yes        Social History     Tobacco Use     Smoking status: Former Smoker     Packs/day: 1.00     Years: 5.00     Pack years: 5.00     Quit date: 3/20/1988     Years since quittin.6     Smokeless tobacco: Never Used   Substance Use Topics     Alcohol use: Yes     Alcohol/week: 2.0 standard drinks     Types: 2 Standard drinks or equivalent per week     Comment: 1 drink - a month     If you drink alcohol do you typically have >3 drinks per day or >7 drinks per week? No                     Reviewed orders with patient.  Reviewed health maintenance and updated  orders accordingly - Yes  Lab work is in process  Labs reviewed in EPIC  BP Readings from Last 3 Encounters:   11/11/20 124/60   07/13/20 138/62   10/30/19 128/76    Wt Readings from Last 3 Encounters:   11/11/20 135.5 kg (298 lb 12.8 oz)   07/13/20 137 kg (302 lb)   10/30/19 120.2 kg (265 lb)                  Patient Active Problem List   Diagnosis     Mixed hyperlipidemia     Metabolic syndrome     Health Care Home     History of GI bleed 2014     ACP (advance care planning)     Shoulder pain     Internal hemorrhoids - per 2017 colonoscopy     Benign neoplasm of cecum 2017 colonoscopy - repeat 2022     Migraine without aura and without status migrainosus, not intractable     Morbid obesity (H)     Status post right hip replacement     Acute gouty arthritis     Past Surgical History:   Procedure Laterality Date     ARTHROPLASTY HIP ANTERIOR Right 10/15/2019    Procedure: RIGHT TOTAL HIP ARTHROPLASTY DIRECT ANTERIOR APPROACH  ;  Surgeon: Simon Pitts MD;  Location:  OR     ARTHROPLASTY KNEE  4/23/2013    Procedure: ARTHROPLASTY KNEE;  RIGHT TOTAL KNEE ARTHROPLASTY (IFEOMA)^;  Surgeon: Simon Pitts MD;  Location:  OR     ARTHROPLASTY KNEE  7/17/2014    Procedure: ARTHROPLASTY KNEE;  Surgeon: Simon Pitts MD;  Location:  OR     AS REPAIR FEMORAL HERNIA,REDUCIBLE  1991    hernia repair femoral     ESOPHAGOSCOPY, GASTROSCOPY, DUODENOSCOPY (EGD), COMBINED  7/22/2014    Procedure: COMBINED ESOPHAGOSCOPY, GASTROSCOPY, DUODENOSCOPY (EGD), BIOPSY SINGLE OR MULTIPLE;  Surgeon: Natalie Gibbons MD;  Location:  GI     OSTEOTOMY FOOT Left 5/13/2019    Procedure: LEFT SECOND METATARSALPHALANGEAL CAPSULE REPAIR ; SECOND AND THIRD METATARSAL  OSTEOTOMY;  Surgeon: Christina Durán MD;  Location:  OR     PLACEMENT OF DENTAL IMPLANT(S)  2014     TONSILLECTOMY & ADENOIDECTOMY  1966    age 6       Social History     Tobacco Use     Smoking status: Former Smoker     Packs/day: 1.00     Years: 5.00      Pack years: 5.00     Quit date: 3/20/1988     Years since quittin.6     Smokeless tobacco: Never Used   Substance Use Topics     Alcohol use: Yes     Alcohol/week: 2.0 standard drinks     Types: 2 Standard drinks or equivalent per week     Comment: 1 drink - a month     Family History   Problem Relation Age of Onset     Osteoporosis Mother      Ovarian Cancer Mother 35     Hypertension Mother      Leukemia Mother      Cancer Father         kidney     C.A.D. Father         early disease     Gout Father      Macular Degeneration Brother      Depression Daughter      Anxiety Disorder Daughter      Neurologic Disorder Maternal Grandmother         TIA's     Diabetes Maternal Grandmother      Lipids Brother      Cancer - colorectal No family hx of      Breast Cancer No family hx of      Anesthesia Reaction No family hx of      Crohn's Disease No family hx of      Ulcerative Colitis No family hx of      Colon Polyps No family hx of          Current Outpatient Medications   Medication Sig Dispense Refill     acetaminophen (TYLENOL) 500 MG tablet Take 1-2 tablets (500-1,000 mg) by mouth every 6 hours as needed for mild pain 60 tablet 0     magnesium 250 MG tablet Take 1 tablet by mouth daily       Turmeric 500 MG CAPS        UNABLE TO FIND MEDICATION NAME: Tart Cherry with celery seed       Allergies   Allergen Reactions     Celebrex [Celecoxib] Nausea and Vomiting     Allopurinol Hives     Aspirin Diarrhea, Fatigue, GI Disturbance, Nausea and Nausea and Vomiting     Ibuprofen GI Disturbance     2 days of gi upset after taking any NSAID     Indomethacin      Recent Labs   Lab Test 10/15/19  0933 19  0623 18  0044 18  1034 16  1011 16  1030 13  1307 13  1307   A1C  --   --   --   --   --   --   --  5.9   LDL  --   --   --  133* 112*  --   --   --    HDL  --   --   --  35* 38*  --   --   --    TRIG  --   --   --  217* 168*  --   --   --    ALT  --   --  32  --  29 46  --   --     CR 0.80 0.75 0.73  --   --  0.68   < >  --    GFRESTIMATED 81 87 82  --   --  89   < >  --    GFRESTBLACK >90 >90 >90  --   --  >90  African American GFR Calc     < >  --    POTASSIUM  --   --  3.7  --   --  4.5   < >  --    TSH  --   --   --   --  1.28  --   --   --     < > = values in this interval not displayed.        Mammogram Screening: Patient over age 50, mutual decision to screen reflected in health maintenance.    Pertinent mammograms are reviewed under the imaging tab.  History of abnormal Pap smear: NO - age 30- 65 PAP every 3 years recommended     Reviewed and updated as needed this visit by clinical staff  Tobacco  Allergies  Meds  Problems  Med Hx  Surg Hx  Fam Hx          Reviewed and updated as needed this visit by Provider                Past Medical History:   Diagnosis Date     Abnormal cardiovascular stress test      Arthritis      Chest pain      Contact dermatitis and other eczema, due to unspecified cause      Cough variant asthma 4/16/2011     Gastric ulcer 2014     Heart disease      Migraine, unspecified, without mention of intractable migraine without mention of status migrainosus      Mild intermittent asthma      Obese      Plantar fascial fibromatosis      PONV (postoperative nausea and vomiting)       Past Surgical History:   Procedure Laterality Date     ARTHROPLASTY HIP ANTERIOR Right 10/15/2019    Procedure: RIGHT TOTAL HIP ARTHROPLASTY DIRECT ANTERIOR APPROACH  ;  Surgeon: Simon Pitts MD;  Location:  OR     ARTHROPLASTY KNEE  4/23/2013    Procedure: ARTHROPLASTY KNEE;  RIGHT TOTAL KNEE ARTHROPLASTY (IFEOMA)^;  Surgeon: Simon Pitts MD;  Location:  OR     ARTHROPLASTY KNEE  7/17/2014    Procedure: ARTHROPLASTY KNEE;  Surgeon: Simon Pitts MD;  Location:  OR     AS REPAIR FEMORAL HERNIA,REDUCIBLE  1991    hernia repair femoral     ESOPHAGOSCOPY, GASTROSCOPY, DUODENOSCOPY (EGD), COMBINED  7/22/2014    Procedure: COMBINED ESOPHAGOSCOPY, GASTROSCOPY,  "DUODENOSCOPY (EGD), BIOPSY SINGLE OR MULTIPLE;  Surgeon: Natalie Gibbons MD;  Location:  GI     OSTEOTOMY FOOT Left 5/13/2019    Procedure: LEFT SECOND METATARSALPHALANGEAL CAPSULE REPAIR ; SECOND AND THIRD METATARSAL  OSTEOTOMY;  Surgeon: Christina Durán MD;  Location:  OR     PLACEMENT OF DENTAL IMPLANT(S)  2014     TONSILLECTOMY & ADENOIDECTOMY  1966    age 6       ROS:  CONSTITUTIONAL: NEGATIVE for fever, chills, change in weight  INTEGUMENTARY/SKIN: NEGATIVE for worrisome rashes, moles or lesions  EYES: NEGATIVE for vision changes or irritation  ENT: NEGATIVE for ear, mouth and throat problems  RESP: NEGATIVE for significant cough or SOB  BREAST: NEGATIVE for masses, tenderness or discharge  CV: NEGATIVE for chest pain, palpitations or peripheral edema  GI: NEGATIVE for nausea, abdominal pain, heartburn, or change in bowel habits  : NEGATIVE for unusual urinary or vaginal symptoms. No vaginal bleeding.  MUSCULOSKELETAL: NEGATIVE for significant arthralgias or myalgia  NEURO: NEGATIVE for weakness, dizziness or paresthesias  PSYCHIATRIC: NEGATIVE for changes in mood or affect     OBJECTIVE:   /60 (BP Location: Left arm, Patient Position: Sitting, Cuff Size: Adult Large)   Pulse 72   Temp 97.7  F (36.5  C) (Oral)   Ht 1.664 m (5' 5.5\")   Wt 135.5 kg (298 lb 12.8 oz)   LMP 03/10/2013   SpO2 98%   BMI 48.97 kg/m       EXAM:  GENERAL: healthy, alert and no distress  EYES: Eyes grossly normal to inspection, PERRL and conjunctivae and sclerae normal  HENT: ear canals and TM's normal, nose and mouth without ulcers or lesions  NECK: no adenopathy, no asymmetry, masses, or scars and thyroid normal to palpation  RESP: lungs clear to auscultation - no rales, rhonchi or wheezes  CV: regular rate and rhythm, normal S1 S2, no S3 or S4, no murmur, click or rub, no peripheral edema and peripheral pulses strong  ABDOMEN: soft, nontender, no hepatosplenomegaly, no masses and bowel sounds normal  MS: no " "gross musculoskeletal defects noted, no edema  SKIN: no suspicious lesions or rashes  NEURO: Normal strength and tone, mentation intact and speech normal  PSYCH: mentation appears normal, affect normal/bright    Diagnostic Test Results:  Labs reviewed in Epic    ASSESSMENT/PLAN:   1. Routine general medical examination at a health care facility    - VENOUS COLLECTION  - Lipid Panel (BFP)  - Glucose Fasting (BFP)    2. Metabolic syndrome    - VENOUS COLLECTION  - Lipid Panel (BFP)  - Glucose Fasting (BFP)    3. Mixed hyperlipidemia    - VENOUS COLLECTION  - Lipid Panel (BFP)    4. Status post right hip replacement    5. Morbid obesity (H)    - VENOUS COLLECTION  - Lipid Panel (BFP)  - Glucose Fasting (BFP)    6. Migraine without aura and without status migrainosus, not intractable      7. Benign neoplasm of cecum 2017 colonoscopy - repeat 2022    8. Internal hemorrhoids - per 2017 colonoscopy      9. History of GI bleed 2014      10. Acute gouty arthritis    - VENOUS COLLECTION  - Uric acid    Patient has been advised of split billing requirements and indicates understanding: Yes  COUNSELING:   Special attention given to:        Regular exercise       Healthy diet/nutrition    Estimated body mass index is 48.97 kg/m  as calculated from the following:    Height as of this encounter: 1.664 m (5' 5.5\").    Weight as of this encounter: 135.5 kg (298 lb 12.8 oz).    Weight management plan: Discussed healthy diet and exercise guidelines   Offered weight management clinic pt declines  Advised food diary and exercise partner    She reports that she quit smoking about 32 years ago. She has a 5.00 pack-year smoking history. She has never used smokeless tobacco.      Counseling Resources:  ATP IV Guidelines  Pooled Cohorts Equation Calculator  Breast Cancer Risk Calculator  BRCA-Related Cancer Risk Assessment: FHS-7 Tool  FRAX Risk Assessment  ICSI Preventive Guidelines  Dietary Guidelines for Americans, 2010  USDA's " MyPlate  ASA Prophylaxis  Lung CA Screening    PAUL Smart  [facility]

## 2020-11-11 NOTE — NURSING NOTE
Rupal is here for a fasting CPX.    Pre-Visit Screening:  Immunizations:flu shot  Colonoscopy:UTD  Mammogram:UTD  Asthma Action Test/Plan:declined today  PHQ9:NA  GAD7:NA  Questioned patient about current smoking habits Pt.former smoker  OK to leave a detailed message on voice mail for today's visit yes, phone # [phone]

## 2020-11-12 LAB — URATE SERPL-MCNC: 8.2 MG/DL (ref 2.5–7)

## 2020-11-13 ENCOUNTER — OFFICE VISIT (OUTPATIENT)
Dept: FAMILY MEDICINE | Facility: CLINIC | Age: 60
End: 2020-11-13

## 2020-11-13 VITALS
OXYGEN SATURATION: 97 % | DIASTOLIC BLOOD PRESSURE: 70 MMHG | TEMPERATURE: 97.9 F | SYSTOLIC BLOOD PRESSURE: 116 MMHG | HEART RATE: 77 BPM

## 2020-11-13 DIAGNOSIS — M79.671 RIGHT FOOT PAIN: Primary | ICD-10-CM

## 2020-11-13 DIAGNOSIS — R68.84 JAW PAIN: ICD-10-CM

## 2020-11-13 PROCEDURE — 99213 OFFICE O/P EST LOW 20 MIN: CPT | Performed by: PHYSICIAN ASSISTANT

## 2020-11-13 PROCEDURE — 73630 X-RAY EXAM OF FOOT: CPT | Performed by: PHYSICIAN ASSISTANT

## 2020-11-13 NOTE — NURSING NOTE
Rupal is here for on going jaw and right foot pain.    Pre-Visit Screening:  Immunizations:UTD  Colonoscopy:UTD  Mammogram:UTD  Asthma Action Test/Plan:NA  PHQ9:NA  GAD7:NA  Questioned patient about current smoking habits Pt.former smoker  OK to leave a detailed message on voice mail for today's visit yes, phone # [phone]

## 2020-11-13 NOTE — PROGRESS NOTES
Subjective     Nursing Notes:   Jade Bonds, [facility]  11/13/2020 10:27 AM  Signed  Rupal is here for on going jaw and right foot pain.    Pre-Visit Screening:  Immunizations:UTD  Colonoscopy:UTD  Mammogram:UTD  Asthma Action Test/Plan:NA  PHQ9:NA  GAD7:NA  Questioned patient about current smoking habits Pt.former smoker  OK to leave a detailed message on voice mail for today's visit yes, phone # [phone]          Rupal Puri is a 60 year old female who presents to clinic today for the following health issues:    HPI         1.  Pt has had intermittent left jaw pain.  When she sometimes chews the jaw will stick open and she is unable to chew foods. She has had to modify diet to soft.  Denies any fevers    2.  3x since Sept has had intermittent right foot swelling and pain.  Area of erythema per pictures she shows is over the dorsal proximal 5th MT.  She does have elevated uric acid levels but states that the pain is different with her gout attacks. She is able to walk on the foot when this pain happens         Review of Systems   Constitutional, HEENT, cardiovascular, pulmonary, gi and gu systems are negative, except as otherwise noted.      Patient Active Problem List   Diagnosis     Mixed hyperlipidemia     Metabolic syndrome     Health Care Home     History of GI bleed 2014     ACP (advance care planning)     Shoulder pain     Internal hemorrhoids - per 2017 colonoscopy     Benign neoplasm of cecum 2017 colonoscopy - repeat 2022     Migraine without aura and without status migrainosus, not intractable     Morbid obesity (H)     Status post right hip replacement     Acute gouty arthritis     Past Surgical History:   Procedure Laterality Date     ARTHROPLASTY HIP ANTERIOR Right 10/15/2019    Procedure: RIGHT TOTAL HIP ARTHROPLASTY DIRECT ANTERIOR APPROACH  ;  Surgeon: Simon Pitts MD;  Location: [facility] OR     ARTHROPLASTY KNEE  4/23/2013    Procedure: ARTHROPLASTY KNEE;  RIGHT TOTAL KNEE ARTHROPLASTY  (IFEOMA)^;  Surgeon: Simon Pitts MD;  Location: [facility] OR     ARTHROPLASTY KNEE  2014    Procedure: ARTHROPLASTY KNEE;  Surgeon: Simon Pitts MD;  Location:  OR     AS REPAIR FEMORAL HERNIA,REDUCIBLE  1991    hernia repair femoral     ESOPHAGOSCOPY, GASTROSCOPY, DUODENOSCOPY (EGD), COMBINED  2014    Procedure: COMBINED ESOPHAGOSCOPY, GASTROSCOPY, DUODENOSCOPY (EGD), BIOPSY SINGLE OR MULTIPLE;  Surgeon: Natalie Gibbons MD;  Location:  GI     OSTEOTOMY FOOT Left 2019    Procedure: LEFT SECOND METATARSALPHALANGEAL CAPSULE REPAIR ; SECOND AND THIRD METATARSAL  OSTEOTOMY;  Surgeon: Christina Durán MD;  Location:  OR     PLACEMENT OF DENTAL IMPLANT(S)       TONSILLECTOMY & ADENOIDECTOMY  1966    age 6       Social History     Tobacco Use     Smoking status: Former Smoker     Packs/day: 1.00     Years: 5.00     Pack years: 5.00     Quit date: 3/20/1988     Years since quittin.6     Smokeless tobacco: Never Used   Substance Use Topics     Alcohol use: Yes     Alcohol/week: 2.0 standard drinks     Types: 2 Standard drinks or equivalent per week     Comment: 1 drink - a month     Family History   Problem Relation Age of Onset     Osteoporosis Mother      Ovarian Cancer Mother 35     Hypertension Mother      Leukemia Mother      Cancer Father         kidney     C.A.D. Father         early disease     Gout Father      Macular Degeneration Brother      Depression Daughter      Anxiety Disorder Daughter      Neurologic Disorder Maternal Grandmother         TIA's     Diabetes Maternal Grandmother      Lipids Brother      Cancer - colorectal No family hx of      Breast Cancer No family hx of      Anesthesia Reaction No family hx of      Crohn's Disease No family hx of      Ulcerative Colitis No family hx of      Colon Polyps No family hx of            Current Outpatient Medications   Medication Sig Dispense Refill     acetaminophen (TYLENOL) 500 MG tablet Take 1-2 tablets (500-1,000  mg) by mouth every 6 hours as needed for mild pain 60 tablet 0     magnesium 250 MG tablet Take 1 tablet by mouth daily       Turmeric 500 MG CAPS        UNABLE TO FIND MEDICATION NAME: Tart Cherry with celery seed       Allergies   Allergen Reactions     Celebrex [Celecoxib] Nausea and Vomiting     Allopurinol Hives     Aspirin Diarrhea, Fatigue, GI Disturbance, Nausea and Nausea and Vomiting     Ibuprofen GI Disturbance     2 days of gi upset after taking any NSAID     Indomethacin      Recent Labs   Lab Test 11/11/20 10/15/19  0933 09/17/19  0623 07/28/18  0044 01/05/18  1034 05/17/16  1011 04/30/16  1030 04/17/13  1307 04/17/13  1307   A1C  --   --   --   --   --   --   --   --  5.9     --   --   --  133* 112*  --   --   --    HDL 44  --   --   --  35* 38*  --   --   --    TRIG 179*  --   --   --  217* 168*  --   --   --    ALT  --   --   --  32  --  29 46  --   --    CR  --  0.80 0.75 0.73  --   --  0.68   < >  --    GFRESTIMATED  --  81 87 82  --   --  89   < >  --    GFRESTBLACK  --  >90 >90 >90  --   --  >90  African American GFR Calc     < >  --    POTASSIUM  --   --   --  3.7  --   --  4.5   < >  --    TSH  --   --   --   --   --  1.28  --   --   --     < > = values in this interval not displayed.        BP Readings from Last 3 Encounters:   11/13/20 116/70   11/11/20 124/60   07/13/20 138/62    Wt Readings from Last 3 Encounters:   11/11/20 135.5 kg (298 lb 12.8 oz)   07/13/20 137 kg (302 lb)   10/30/19 120.2 kg (265 lb)              Objective    /70 (BP Location: Right arm, Patient Position: Sitting, Cuff Size: Adult Large)   Pulse 77   Temp 97.9  F (36.6  C) (Oral)   LMP 03/10/2013   SpO2 97%   There is no height or weight on file to calculate BMI.  Physical Exam     GENERAL: healthy, alert and no distress  Head: Normocephalic, atraumatic.  Eyes: Conjunctiva clear, no discharge  There is no crepitus of TMJ bilaterally  Full ROM of jaw   Mucous membranes moist.  There is no TMJ  tenderness to palpation    Right foot  Inspection:  no swelling, no ecchymosis  Non-tender:proximal 5th metatarsal, midshaft 5th metatarsal, calcaneous , cuboid, navicular, cuneiform lateral, cuneiform middle, cuneiform medial , metatarsal heads  Range of Motion:flexion of toes:  full, extension of toes  full    Labs Resulted Today:   Results for orders placed or performed in visit on 20   X-ray rt Foot G/E 3 vws*     Status: None    Narrative    [facility]  Phone: [phone] * Fax: [phone]   , ,    [address] [place] [address]  Age: 60 Y  Dept No.: [id number]  DOTTY HARP : 1960  Chart #  Gender: F  Req. Phys: Denia Salomon PA Clinic MRN:  Clinic: [facility] Acc#: [id number]  Exam: * XR FOOT AP LATERAL OBLIQUE RIGHT Exam Date: 2020  EXAM: XR FOOT AP LATERAL OBLIQUE RIGHT  LOCATION: [facility]  DATE/TIME: 2020 12:17 PM  INDICATION: Right foot pain.  COMPARISON: None.  IMPRESSION: No acute fracture or malalignment. Moderate TMT joint space narrowing. Mild tibiotalar joint  degenerative changes. Small plantar calcaneal enthesophyte.  Performed by: AIDEE  Transcribed By: NAIDA on: 2020 12:23 PM CST  Finalized By: SONA GARCIA M.D. on: 2020 12:23 PM CST  Dictated By: SONA GARCIA M.D. Signed by:   Page 1 of 1         Assessment & Plan   1. Right foot pain  Advised APAP prn  Could be odd presentation of gout  Will refer to [facility] foot specialist for eval if worsening  Pt declines current referral  - X-ray rt Foot G/E 3 vws*    2. Jaw pain  Suspect TMJ  Will possibly need xrays to evaluated bite/joint spacing  Pt to schedule  - Other Specialty Referral          PAUL Smart  [facility]

## 2020-12-17 ENCOUNTER — MYC MEDICAL ADVICE (OUTPATIENT)
Dept: FAMILY MEDICINE | Facility: CLINIC | Age: 60
End: 2020-12-17

## 2020-12-17 NOTE — TELEPHONE ENCOUNTER
Spoke with pt  She opted to pay out of pocket to see a friend of hers who is in training to become NILS Salomon PA-C  12/17/2020

## 2020-12-17 NOTE — TELEPHONE ENCOUNTER
Denia please review patients MY CHART message below RE a Dietician/Nutritionist. Do you have someone you would suggest?     Please advise    Thank you  Fernanda

## 2020-12-23 ENCOUNTER — HOSPITAL ENCOUNTER (OUTPATIENT)
Dept: MAMMOGRAPHY | Facility: CLINIC | Age: 60
Discharge: HOME | End: 2020-12-23
Attending: OBSTETRICS & GYNECOLOGY | Admitting: OBSTETRICS & GYNECOLOGY
Payer: COMMERCIAL

## 2020-12-23 ENCOUNTER — TRANSFERRED RECORDS (OUTPATIENT)
Dept: FAMILY MEDICINE | Facility: CLINIC | Age: 60
End: 2020-12-23

## 2020-12-23 DIAGNOSIS — Z12.31 VISIT FOR SCREENING MAMMOGRAM: ICD-10-CM

## 2020-12-23 PROCEDURE — 77067 SCR MAMMO BI INCL CAD: CPT

## 2021-01-29 ENCOUNTER — OFFICE VISIT (OUTPATIENT)
Dept: FAMILY MEDICINE | Facility: CLINIC | Age: 61
End: 2021-01-29

## 2021-01-29 VITALS
BODY MASS INDEX: 47.09 KG/M2 | OXYGEN SATURATION: 99 % | TEMPERATURE: 98 F | DIASTOLIC BLOOD PRESSURE: 70 MMHG | SYSTOLIC BLOOD PRESSURE: 120 MMHG | HEART RATE: 81 BPM | WEIGHT: 293 LBS | HEIGHT: 66 IN

## 2021-01-29 DIAGNOSIS — M79.89 SWELLING OF LIMB: ICD-10-CM

## 2021-01-29 DIAGNOSIS — M79.662 PAIN OF LEFT LOWER LEG: Primary | ICD-10-CM

## 2021-01-29 PROCEDURE — 99213 OFFICE O/P EST LOW 20 MIN: CPT | Performed by: FAMILY MEDICINE

## 2021-01-29 RX ORDER — CEPHALEXIN 500 MG/1
1000 CAPSULE ORAL 2 TIMES DAILY
Qty: 28 CAPSULE | Refills: 0 | Status: SHIPPED | OUTPATIENT
Start: 2021-01-29 | End: 2021-10-12

## 2021-01-29 ASSESSMENT — MIFFLIN-ST. JEOR: SCORE: 1964.33

## 2021-01-29 NOTE — PROGRESS NOTES
"SUBJECTIVE:  Left sided calf pain and redness since Wednesday   Did have cramps that night now mild swelling and discomfort also saw redness  No F/C no nausea vomiting     Patient Active Problem List   Diagnosis     Mixed hyperlipidemia     Metabolic syndrome     Health Care Home     History of GI bleed 2014     ACP (advance care planning)     Shoulder pain     Internal hemorrhoids - per 2017 colonoscopy     Benign neoplasm of cecum 2017 colonoscopy - repeat 2022     Migraine without aura and without status migrainosus, not intractable     Morbid obesity (H)     Status post right hip replacement     Acute gouty arthritis     Current Outpatient Medications   Medication Instructions     acetaminophen (TYLENOL) 500-1,000 mg, Oral, EVERY 6 HOURS PRN     cephALEXin (KEFLEX) 1,000 mg, Oral, 2 TIMES DAILY     magnesium 250 MG tablet 1 tablet, Oral, DAILY     Turmeric 500 MG CAPS Oral     UNABLE TO FIND MEDICATION NAME: Tart Cherry with celery seed       ROS: 10 point ROS neg other than the symptoms noted above in the HPI.    /70 (BP Location: Right arm, Patient Position: Sitting, Cuff Size: Adult Large)   Pulse 81   Temp 98  F (36.7  C) (Oral)   Ht 1.676 m (5' 6\")   Wt 137.3 kg (302 lb 9.6 oz)   LMP 03/10/2013   SpO2 99%   BMI 48.84 kg/m    GENERAL: no apparent distress    CARDIAC: regular rate and rhythm, no murmur  RESP: clear, no wheezing, no rales, no rhonchi  ABD: soft, no distension, no tenderness  SKIN: No rashes  Left leg calf tenderness with redness but not warm to touch    (M79.662) Pain of left lower leg  (primary encounter diagnosis)  Comment:   Plan: cephALEXin (KEFLEX) 500 MG capsule            (M79.89) Swelling of limb  Comment:   Plan: cephALEXin (KEFLEX) 500 MG capsule                "

## 2021-01-29 NOTE — NURSING NOTE
Rupal is here for left calf pain that started Wednesday and is now red. She is concerned of blood clots.    Pre-Visit Screening:  Immunizations:UTD  Colonoscopy:NA  Mammogram:NA  Asthma Action Test/Plan:Na  PHQ9:NA  GAD7:NA  Questioned patient about current smoking habits Pt.former smoker  OK to leave a detailed message on voice mail for today's visit yes, phone # [phone]

## 2021-02-16 ENCOUNTER — TELEPHONE (OUTPATIENT)
Dept: FAMILY MEDICINE | Facility: CLINIC | Age: 61
End: 2021-02-16

## 2021-02-16 NOTE — TELEPHONE ENCOUNTER
Rupal called requesting a prescription from Denia for colonoscopy prep supplies. I informed her to call [facility].    Rupal's phone # [phone]

## 2021-03-24 ENCOUNTER — IMMUNIZATION (OUTPATIENT)
Dept: NURSING | Facility: CLINIC | Age: 61
End: 2021-03-24
Payer: COMMERCIAL

## 2021-03-24 PROCEDURE — 91300 PR COVID VAC PFIZER DIL RECON 30 MCG/0.3 ML IM: CPT

## 2021-03-24 PROCEDURE — 0001A PR COVID VAC PFIZER DIL RECON 30 MCG/0.3 ML IM: CPT

## 2021-04-14 ENCOUNTER — OFFICE VISIT (OUTPATIENT)
Dept: NURSING | Facility: CLINIC | Age: 61
End: 2021-04-14
Attending: INTERNAL MEDICINE
Payer: COMMERCIAL

## 2021-04-14 PROCEDURE — 0002A PR COVID VAC PFIZER DIL RECON 30 MCG/0.3 ML IM: CPT

## 2021-04-14 PROCEDURE — 91300 PR COVID VAC PFIZER DIL RECON 30 MCG/0.3 ML IM: CPT

## 2021-10-12 ENCOUNTER — OFFICE VISIT (OUTPATIENT)
Dept: FAMILY MEDICINE | Facility: CLINIC | Age: 61
End: 2021-10-12

## 2021-10-12 VITALS
DIASTOLIC BLOOD PRESSURE: 80 MMHG | OXYGEN SATURATION: 99 % | BODY MASS INDEX: 48.91 KG/M2 | WEIGHT: 293 LBS | SYSTOLIC BLOOD PRESSURE: 126 MMHG | TEMPERATURE: 97.2 F | HEART RATE: 78 BPM

## 2021-10-12 DIAGNOSIS — Z23 NEED FOR VACCINATION: ICD-10-CM

## 2021-10-12 DIAGNOSIS — G44.209 TENSION HEADACHE: Primary | ICD-10-CM

## 2021-10-12 PROCEDURE — 90714 TD VACC NO PRESV 7 YRS+ IM: CPT | Performed by: PHYSICIAN ASSISTANT

## 2021-10-12 PROCEDURE — 90686 IIV4 VACC NO PRSV 0.5 ML IM: CPT | Performed by: PHYSICIAN ASSISTANT

## 2021-10-12 PROCEDURE — 99213 OFFICE O/P EST LOW 20 MIN: CPT | Mod: 25 | Performed by: PHYSICIAN ASSISTANT

## 2021-10-12 PROCEDURE — 90471 IMMUNIZATION ADMIN: CPT | Performed by: PHYSICIAN ASSISTANT

## 2021-10-12 PROCEDURE — 90472 IMMUNIZATION ADMIN EACH ADD: CPT | Performed by: PHYSICIAN ASSISTANT

## 2021-10-12 NOTE — NURSING NOTE
Chief Complaint   Patient presents with     Headache     3-4 days, pain behind eyes, moves temporal to down jaw.     Pre-Visit Screening:  Immunizations:TD, flu   Colonoscopy:NA  Mammogram:NA  Asthma Action Test/Plan:does not have asthma   PHQ9:NA  GAD7:Na  Questioned patient about current smoking habits Pt.former  OK to leave a detailed message on voice mail for today's visit yes, phone # [phone]   ACP discussed

## 2021-10-12 NOTE — PROGRESS NOTES
Assessment & Plan     Tension headache  I highly suspect this is a tension HA  Advised icing/NSAIDs  Exercises from [facility] provided to be completed 1-2 times daily, as tolerated.   Will refer to PT or have her RTC to See Dr. Hinojosa for further eval.   Discussed concerning sx of when to return/go to ED/911 with any stroke like symptoms    Need for vaccination    - TD, PF, 7 + YRS  - FLU VAC PRESRV FREE QUAD SPLIT VIR 3+YRS IM        PAUL Smart  [facility]    Subjective     Nursing Notes:   Jade Chase CMA  10/12/2021  1:03 PM  Signed  Chief Complaint   Patient presents with     Headache     3-4 days, pain behind eyes, moves temporal to down jaw.     Pre-Visit Screening:  Immunizations:TD, flu   Colonoscopy:NA  Mammogram:NA  Asthma Action Test/Plan:does not have asthma   PHQ9:NA  GAD7:Na  Questioned patient about current smoking habits Pt.former  OK to leave a detailed message on voice mail for today's visit yes, phone # [phone]   ACP discussed           Rupal Puri is a 61 year old female who presents to clinic today for the following health issues:     HPI         Headache  Onset/Duration: 6 days ago  Description  Location: right side of head  Character:  Dull ache   Frequency:  constant  Duration:    Wake with headaches: will wake with ache  Able to do daily activities when headache present: YES  Intensity:  moderate  Progression of Symptoms:  Slight improvement today  Accompanying signs and symptoms:  Stiff neck: no  Neck or upper back pain: right shoulder pain  Sinus or URI symptoms no   Fever: no  Nausea or vomiting: no  Dizziness: no  Numbness/tingling: no  Weakness: no  Visual changes:  -Focus is worse - made eye doctor appt. - last months  History  Head trauma: no  Precipitating or Alleviating factors (light/sound/sleep/caffeine): none  Therapies tried and outcome: none               With bending/pick something up will get worse pain  Pt is right  handed.     Hx of Migraines - 2 weeks after Lyme dg many years ago.   Stopped 2 years ago after immunotherapy.  [facility].   Once every other month  Maxalt ODT  Rare aura.           Objective    /80 (BP Location: Right arm, Patient Position: Sitting, Cuff Size: Adult Large)   Pulse 78   Temp 97.2  F (36.2  C)   Wt 137.4 kg (303 lb)   LMP 03/10/2013   SpO2 99%   BMI 48.91 kg/m    Body mass index is 48.91 kg/m .  Physical Exam   GENERAL: healthy, alert and no distress  EYES: Eyes grossly normal to inspection, PERRL and conjunctivae and sclerae normal  HENT: ear canals and TM's normal, nose and mouth without ulcers or lesions  NECK: no adenopathy, no asymmetry, masses, or scars and thyroid normal to palpation  RESP: lungs clear to auscultation - no rales, rhonchi or wheezes  CV: regular rate and rhythm, normal S1 S2, no S3 or S4, no murmur, click or rub, no peripheral edema and peripheral pulses strong  MS: Full ROM cervical spine.  There is moderate tenderness to palpation of the right trapezius and insertion at the right occiput. Mild tenderness.  Spurling's negative bilaterally    There is no temporal A tenderness.     Neuro: CN II - XII intact  Gait:  Normal gait,   Test strength in the following muscles bilaterally: biceps, Triceps, hip flexors, knee flexor/extensors, ankle dorsiflexors and plantarflexors are all normal.   Normal: . Test finger tapping, nose to finger, and heel-knee-shin performance.   Pupils are round, reactive to light, EOM intact in all directions.

## 2021-12-17 ENCOUNTER — OFFICE VISIT (OUTPATIENT)
Dept: FAMILY MEDICINE | Facility: CLINIC | Age: 61
End: 2021-12-17

## 2021-12-17 ENCOUNTER — IMMUNIZATION (OUTPATIENT)
Dept: NURSING | Facility: CLINIC | Age: 61
End: 2021-12-17
Payer: COMMERCIAL

## 2021-12-17 ENCOUNTER — LAB (OUTPATIENT)
Dept: LAB | Facility: CLINIC | Age: 61
End: 2021-12-17
Payer: COMMERCIAL

## 2021-12-17 VITALS
WEIGHT: 293 LBS | DIASTOLIC BLOOD PRESSURE: 62 MMHG | SYSTOLIC BLOOD PRESSURE: 124 MMHG | RESPIRATION RATE: 20 BRPM | TEMPERATURE: 97.2 F | HEIGHT: 66 IN | HEART RATE: 84 BPM | BODY MASS INDEX: 47.09 KG/M2

## 2021-12-17 DIAGNOSIS — Z83.2 FAMILY HISTORY OF POLYCYTHEMIA VERA: ICD-10-CM

## 2021-12-17 DIAGNOSIS — Z83.2 FAMILY HISTORY OF FACTOR V LEIDEN MUTATION: ICD-10-CM

## 2021-12-17 DIAGNOSIS — E88.810 METABOLIC SYNDROME: ICD-10-CM

## 2021-12-17 DIAGNOSIS — S09.92XA INJURY OF NOSE, INITIAL ENCOUNTER: ICD-10-CM

## 2021-12-17 DIAGNOSIS — R73.01 ELEVATED FASTING GLUCOSE: ICD-10-CM

## 2021-12-17 DIAGNOSIS — M79.672 BILATERAL FOOT PAIN: ICD-10-CM

## 2021-12-17 DIAGNOSIS — I49.9 CARDIAC ARRHYTHMIA, UNSPECIFIED CARDIAC ARRHYTHMIA TYPE: ICD-10-CM

## 2021-12-17 DIAGNOSIS — R93.1 ABNORMAL ECHOCARDIOGRAM: ICD-10-CM

## 2021-12-17 DIAGNOSIS — R01.1 UNDIAGNOSED CARDIAC MURMURS: ICD-10-CM

## 2021-12-17 DIAGNOSIS — D12.0 BENIGN NEOPLASM OF CECUM: ICD-10-CM

## 2021-12-17 DIAGNOSIS — R19.5 CHANGE IN STOOL: ICD-10-CM

## 2021-12-17 DIAGNOSIS — F41.8 SITUATIONAL ANXIETY: ICD-10-CM

## 2021-12-17 DIAGNOSIS — Z87.19 HISTORY OF GI BLEED: ICD-10-CM

## 2021-12-17 DIAGNOSIS — M79.671 BILATERAL FOOT PAIN: ICD-10-CM

## 2021-12-17 DIAGNOSIS — E78.2 MIXED HYPERLIPIDEMIA: ICD-10-CM

## 2021-12-17 DIAGNOSIS — E66.01 MORBID OBESITY (H): Primary | ICD-10-CM

## 2021-12-17 LAB
BASOPHILS # BLD AUTO: 0 10E3/UL (ref 0–0.2)
BASOPHILS # BLD AUTO: 0 10E3/UL (ref 0–0.2)
BASOPHILS NFR BLD AUTO: 0 %
BASOPHILS NFR BLD AUTO: 0 %
CHOLEST SERPL-MCNC: 185 MG/DL (ref 0–199)
CHOLEST/HDLC SERPL: 5 {RATIO} (ref 0–5)
EOSINOPHIL # BLD AUTO: 0.1 10E3/UL (ref 0–0.7)
EOSINOPHIL # BLD AUTO: 0.1 10E3/UL (ref 0–0.7)
EOSINOPHIL NFR BLD AUTO: 1 %
EOSINOPHIL NFR BLD AUTO: 1 %
ERYTHROCYTE [DISTWIDTH] IN BLOOD BY AUTOMATED COUNT: 12.4 % (ref 10–15)
ERYTHROCYTE [DISTWIDTH] IN BLOOD BY AUTOMATED COUNT: 12.5 % (ref 10–15)
FACTOR V INTERPRETATION: NORMAL
GLUCOSE SERPL-MCNC: 110 MG/DL (ref 60–99)
HBA1C MFR BLD: 5.9 % (ref 4–7)
HCT VFR BLD AUTO: 42.3 % (ref 35–47)
HCT VFR BLD AUTO: 42.5 % (ref 35–47)
HDLC SERPL-MCNC: 41 MG/DL (ref 40–150)
HGB BLD-MCNC: 13.2 G/DL (ref 11.7–15.7)
HGB BLD-MCNC: 13.4 G/DL (ref 11.7–15.7)
IMM GRANULOCYTES # BLD: 0 10E3/UL
IMM GRANULOCYTES # BLD: 0 10E3/UL
IMM GRANULOCYTES NFR BLD: 0 %
IMM GRANULOCYTES NFR BLD: 0 %
LAB DIRECTOR COMMENTS: NORMAL
LAB DIRECTOR DISCLAIMER: NORMAL
LAB DIRECTOR INTERPRETATION: NORMAL
LAB DIRECTOR METHODOLOGY: NORMAL
LAB DIRECTOR RESULTS: NORMAL
LDLC SERPL CALC-MCNC: 109 MG/DL (ref 0–130)
LYMPHOCYTES # BLD AUTO: 1.6 10E3/UL (ref 0.8–5.3)
LYMPHOCYTES # BLD AUTO: 1.6 10E3/UL (ref 0.8–5.3)
LYMPHOCYTES NFR BLD AUTO: 24 %
LYMPHOCYTES NFR BLD AUTO: 25 %
MCH RBC QN AUTO: 29.7 PG (ref 26.5–33)
MCH RBC QN AUTO: 29.8 PG (ref 26.5–33)
MCHC RBC AUTO-ENTMCNC: 31.1 G/DL (ref 31.5–36.5)
MCHC RBC AUTO-ENTMCNC: 31.7 G/DL (ref 31.5–36.5)
MCV RBC AUTO: 94 FL (ref 78–100)
MCV RBC AUTO: 96 FL (ref 78–100)
MONOCYTES # BLD AUTO: 0.5 10E3/UL (ref 0–1.3)
MONOCYTES # BLD AUTO: 0.5 10E3/UL (ref 0–1.3)
MONOCYTES NFR BLD AUTO: 8 %
MONOCYTES NFR BLD AUTO: 8 %
NEUTROPHILS # BLD AUTO: 4.4 10E3/UL (ref 1.6–8.3)
NEUTROPHILS # BLD AUTO: 4.5 10E3/UL (ref 1.6–8.3)
NEUTROPHILS NFR BLD AUTO: 66 %
NEUTROPHILS NFR BLD AUTO: 67 %
NRBC # BLD AUTO: 0 10E3/UL
NRBC # BLD AUTO: 0 10E3/UL
NRBC BLD AUTO-RTO: 0 /100
NRBC BLD AUTO-RTO: 0 /100
PLATELET # BLD AUTO: 223 10E3/UL (ref 150–450)
PLATELET # BLD AUTO: 225 10E3/UL (ref 150–450)
RBC # BLD AUTO: 4.43 10E6/UL (ref 3.8–5.2)
RBC # BLD AUTO: 4.51 10E6/UL (ref 3.8–5.2)
RBC MORPH BLD: NORMAL
SPECIMEN DESCRIPTION: NORMAL
TRIGL SERPL-MCNC: 175 MG/DL (ref 0–149)
WBC # BLD AUTO: 6.6 10E3/UL (ref 4–11)
WBC # BLD AUTO: 6.8 10E3/UL (ref 4–11)

## 2021-12-17 PROCEDURE — 80061 LIPID PANEL: CPT | Performed by: PHYSICIAN ASSISTANT

## 2021-12-17 PROCEDURE — 85025 COMPLETE CBC W/AUTO DIFF WBC: CPT

## 2021-12-17 PROCEDURE — 83036 HEMOGLOBIN GLYCOSYLATED A1C: CPT | Performed by: PHYSICIAN ASSISTANT

## 2021-12-17 PROCEDURE — 93000 ELECTROCARDIOGRAM COMPLETE: CPT | Performed by: PHYSICIAN ASSISTANT

## 2021-12-17 PROCEDURE — 81241 F5 GENE: CPT

## 2021-12-17 PROCEDURE — 36415 COLL VENOUS BLD VENIPUNCTURE: CPT | Performed by: PHYSICIAN ASSISTANT

## 2021-12-17 PROCEDURE — 91300 PR COVID VAC PFIZER DIL RECON 30 MCG/0.3 ML IM: CPT

## 2021-12-17 PROCEDURE — 36415 COLL VENOUS BLD VENIPUNCTURE: CPT

## 2021-12-17 PROCEDURE — G0452 MOLECULAR PATHOLOGY INTERPR: HCPCS | Mod: 26 | Performed by: PATHOLOGY

## 2021-12-17 PROCEDURE — 82947 ASSAY GLUCOSE BLOOD QUANT: CPT | Performed by: PHYSICIAN ASSISTANT

## 2021-12-17 PROCEDURE — 99215 OFFICE O/P EST HI 40 MIN: CPT | Mod: 25 | Performed by: PHYSICIAN ASSISTANT

## 2021-12-17 PROCEDURE — 0004A PR COVID VAC PFIZER DIL RECON 30 MCG/0.3 ML IM: CPT

## 2021-12-17 PROCEDURE — 84443 ASSAY THYROID STIM HORMONE: CPT | Mod: 90 | Performed by: PHYSICIAN ASSISTANT

## 2021-12-17 RX ORDER — DIAZEPAM 2 MG
TABLET ORAL
Qty: 10 TABLET | Refills: 0 | Status: SHIPPED | OUTPATIENT
Start: 2021-12-17 | End: 2022-12-20

## 2021-12-17 ASSESSMENT — MIFFLIN-ST. JEOR: SCORE: 1954.8

## 2021-12-17 NOTE — NURSING NOTE
Rupal Puri is here for a CPX.    Pre-visit planning  Immunizations -up to date  Colonoscopy -is up to date  Mammogram -is up to date  Asthma test --  PHQ9 -  VASILIY 7 -  Hearing screen -    Questioned patient about current smoking habits.  Pt. quit smoking some time ago.  Body mass index is 48.68 kg/m .  PULSE regular  My Chart: active  CLASSIFICATION OF OVERWEIGHT AND OBESITY BY BMI                        Obesity Class           BMI(kg/m2)  Underweight                                    < 18.5  Normal                                         18.5-24.9  Overweight                                     25.0-29.9  OBESITY                     I                  30.0-34.9                             II                 35.0-39.9  EXTREME OBESITY             III                >40                            Patient's  BMI Body mass index is 48.68 kg/m .

## 2021-12-17 NOTE — PROGRESS NOTES
Assessment & Plan     Morbid obesity (H)  Continue WW  - VENOUS COLLECTION  - Lipid Panel (BFP)  - Glucose Fasting (BFP)    Metabolic syndrome    - VENOUS COLLECTION  - Lipid Panel (BFP)  - Glucose Fasting (BFP)    Mixed hyperlipidemia    - VENOUS COLLECTION  - Lipid Panel (BFP)  - Echocardiogram Complete    History of GI bleed 2014  On Lovenox post ortho surgery    Benign neoplasm of cecum 2017 colonoscopy - repeat 2022      Cardiac arrhythmia, unspecified cardiac arrhythmia type  New heart murmur and arrhythmia - echo ordered  - EKG 12-lead complete w/read - Clinics  - VENOUS COLLECTION  - TSH WITH FREE T4 REFLEX ([facility])  - Echocardiogram Complete    ADDENDUM - CONDUCTION ABNORMALITY SHOWN ON ECHO - REFERRED TO CARDIOLOIGST    Family history of factor V Leiden mutation  Labs at hospital   - Factor 5 leiden mutation analysis  - CBC with Platelets & Differential  - Blood smear morphology    Family history of polycythemia vera  Blood work at hospital  - Factor 5 leiden mutation analysis  - CBC with Platelets & Differential  - Blood smear morphology    Situational anxiety  No alcohol, driving with Valium     Injury of nose, initial encounter  Resolved - monitor    Bilateral foot pain  See Dr. Hinojosa if needed    Change in stool  Resolved, watch dairy.       Undiagnosed cardiac murmurs  - Echocardiogram Complete    Rectal bleeding  Will refer to [facility]-rectal if worsening or if Hgb low    RTC for fasting CPE      53 min spent with pt today    PAUL Smart  [facility]    Subjective           Rupal Puri is a 61 year old female who presents to clinic today for the following health issues:     HPI   Pt originally schedule for CPE - other more important concerns  Will RTC for CPE    1. Pulse is irregular today  Known BBB   Pulse 38 last during 2019  Denies CP, SOB with exertion      2. Brother has Factor V Leiden and Polycythemia Factor   Brother had blood clots in abdomen a year  "ago    3. Dr. Antonella Gonsalez - Chiropractor (natruopath)  Doing well after treatment several years ago    4. Valium  Prn use for situational anxiety    5. Gout   Was on allopuinol  - hives  No gout severe since   colchine - 2 doses a couple times  Not interested in rechecking uric acid    6. Weight loss  Doing WW    7. Foot pain -   Was at [facility] for left foot pain  Right foot - wore boot/icing  Now pain resolved      Wt Readings from Last 5 Encounters:   12/17/21 136.8 kg (301 lb 9.6 oz)   10/12/21 137.4 kg (303 lb)   01/29/21 137.3 kg (302 lb 9.6 oz)   11/11/20 135.5 kg (298 lb 12.8 oz)   07/13/20 137 kg (302 lb)     8. Dropped curtain mady on nose  Hanging curtain rods  3 weeks ago  Swelling and bruising   This has resolved    9. 6 weeks ago - \"peanut butter poop\" consistency for 2 years  No flatulence  Stopped diary - sx now resolved.   Minimal flatulence   Hemorrhoids known  Blood in stool        Colonoscopy 2021 per pt - will need to get report    10.  Right finger DIP palmar surface  Bump  Mildly painful   Dermatology Specialist    11. [employer] daughter taking for her skin   Gave pt a bottle    12. Hx GI bleed 2014- pt doesn't recall this                    Objective    /62 (BP Location: Left arm, Patient Position: Chair, Cuff Size: Adult Large)   Pulse 84   Temp 97.2  F (36.2  C)   Resp 20   Ht 1.676 m (5' 6\")   Wt 136.8 kg (301 lb 9.6 oz)   LMP 03/10/2013   BMI 48.68 kg/m    Body mass index is 48.68 kg/m .  Physical Exam   GENERAL: healthy, alert and no distress  EYES: Eyes grossly normal to inspection, PERRL and conjunctivae and sclerae normal  HENT: ear canals and TM's normal, nose and mouth without ulcers or lesions  NECK: no adenopathy, no asymmetry, masses, or scars and thyroid normal to palpation  RESP: lungs clear to auscultation - no rales, rhonchi or wheezes  CV: irregular rate 40-80 - new grade III systolic murmur   MS: no gross musculoskeletal defects noted, no edema  Nose : no " crepitus - no bruising

## 2021-12-18 LAB — TSH SERPL-ACNC: 1.65 MIU/L (ref 0.4–4.5)

## 2021-12-19 ENCOUNTER — HEALTH MAINTENANCE LETTER (OUTPATIENT)
Age: 61
End: 2021-12-19

## 2021-12-29 ENCOUNTER — HOSPITAL ENCOUNTER (OUTPATIENT)
Dept: CARDIOLOGY | Facility: CLINIC | Age: 61
Discharge: HOME | End: 2021-12-29
Attending: PHYSICIAN ASSISTANT | Admitting: PHYSICIAN ASSISTANT
Payer: COMMERCIAL

## 2021-12-29 DIAGNOSIS — R01.1 UNDIAGNOSED CARDIAC MURMURS: ICD-10-CM

## 2021-12-29 DIAGNOSIS — I49.9 CARDIAC ARRHYTHMIA, UNSPECIFIED CARDIAC ARRHYTHMIA TYPE: ICD-10-CM

## 2021-12-29 DIAGNOSIS — E78.2 MIXED HYPERLIPIDEMIA: ICD-10-CM

## 2021-12-29 LAB — LVEF ECHO: NORMAL

## 2021-12-29 PROCEDURE — 93306 TTE W/DOPPLER COMPLETE: CPT

## 2021-12-29 PROCEDURE — 93306 TTE W/DOPPLER COMPLETE: CPT | Mod: 26 | Performed by: INTERNAL MEDICINE

## 2022-02-24 ENCOUNTER — OFFICE VISIT (OUTPATIENT)
Dept: FAMILY MEDICINE | Facility: CLINIC | Age: 62
End: 2022-02-24

## 2022-02-24 ENCOUNTER — HOSPITAL ENCOUNTER (OUTPATIENT)
Dept: MAMMOGRAPHY | Facility: CLINIC | Age: 62
Discharge: HOME | End: 2022-02-24
Attending: OBSTETRICS & GYNECOLOGY | Admitting: OBSTETRICS & GYNECOLOGY
Payer: COMMERCIAL

## 2022-02-24 VITALS
TEMPERATURE: 97.4 F | OXYGEN SATURATION: 98 % | SYSTOLIC BLOOD PRESSURE: 114 MMHG | DIASTOLIC BLOOD PRESSURE: 62 MMHG | WEIGHT: 289 LBS | HEIGHT: 66 IN | BODY MASS INDEX: 46.45 KG/M2 | HEART RATE: 73 BPM

## 2022-02-24 DIAGNOSIS — R09.A2 FOREIGN BODY SENSATION IN THROAT: Primary | ICD-10-CM

## 2022-02-24 DIAGNOSIS — Z12.31 VISIT FOR SCREENING MAMMOGRAM: ICD-10-CM

## 2022-02-24 PROCEDURE — 77067 SCR MAMMO BI INCL CAD: CPT

## 2022-02-24 PROCEDURE — 99213 OFFICE O/P EST LOW 20 MIN: CPT | Performed by: PHYSICIAN ASSISTANT

## 2022-02-24 RX ORDER — RIZATRIPTAN BENZOATE 10 MG/1
TABLET, ORALLY DISINTEGRATING ORAL
COMMUNITY
End: 2022-05-09

## 2022-02-24 NOTE — NURSING NOTE
Chief Complaint   Patient presents with     Throat Problem     magnesium supplement is stuck in her throat. Has tried warm water, yogurt         Pre-visit Screening:  Immunizations:  up to date  Colonoscopy:  is up to date  Mammogram: is up to date  Asthma Action Test/Plan:  NA  PHQ9:  NA  GAD7:  NA  Questioned patient about current smoking habits Pt. Quit some time ago  Ok to leave detailed message on voice mail for today's visit only Yes, phone # [phone]

## 2022-02-24 NOTE — PROGRESS NOTES
"CC: Pill stuck in throat    History:  Last night was taking her normal evening medications. Decided to break her normal magnesium supplement in half as it is quite a large pill. Unfortunately, during the process of swallowing, the pill seemed to get stuck in upper esophagus. Tried drinking tea, yogurt. No improvement today. Still feels a general discomfort when swallowing like it is still stuck.    Denies any general swallowing issues, reflux prior to onset on her symptoms.    PMH, MEDICATIONS, ALLERGIES, SOCIAL AND FAMILY HISTORY in [facility] and reviewed by me personally.    ROS negative other than the symptoms noted above in the HPI.    Examination   /62 (BP Location: Left arm, Patient Position: Sitting, Cuff Size: Adult Large)   Pulse 73   Temp 97.4  F (36.3  C) (Temporal)   Ht 1.676 m (5' 6\")   Wt 131.1 kg (289 lb)   LMP 03/10/2013   SpO2 98%   BMI 46.65 kg/m       Constitutional: Sitting comfortably, in no acute distress. Vital signs noted  Mouth and throat: without erythema or lesions of the mucosa  Neck:  no adenopathy, trachea midline and normal to palpation, thyroid normal to palpation  Cardiovascular:  regular rate and rhythm, no murmurs, clicks, or gallops  Respiratory:  normal respiratory rate and rhythm, lungs clear to auscultation  SKIN: No jaundice/pallor/rash.   Psychiatric: mentation appears normal and affect normal/bright    A/P    ICD-10-CM    1. Foreign body sensation in throat  R09.89        DISCUSSION:  Reassured by normal vital signs, ability to swallow liquids, semi-solids. Explained to pt that this is typically residual inflammation versus tablet actually being stuck in throat. Explained that she should continue to have regular fluid intake, and should start to see improvement in her symptoms as the inflammation heals over the next 24-48 hours. However, she is quite concerned and would feel better seeing ENT to be sure. Agreed to start this referral. Ideally would be scheduled for " tomorrow, where she could cancel if she can tell there is improvement. ER with any significant worsening.     follow up visit: As needed    Aniyah Whittaker PA-C  [facility]

## 2022-02-25 ENCOUNTER — TRANSFERRED RECORDS (OUTPATIENT)
Dept: FAMILY MEDICINE | Facility: CLINIC | Age: 62
End: 2022-02-25

## 2022-03-02 ENCOUNTER — OFFICE VISIT (OUTPATIENT)
Dept: CARDIOLOGY | Facility: CLINIC | Age: 62
End: 2022-03-02
Attending: PHYSICIAN ASSISTANT
Payer: COMMERCIAL

## 2022-03-02 VITALS
HEIGHT: 66 IN | WEIGHT: 293 LBS | HEART RATE: 79 BPM | BODY MASS INDEX: 47.09 KG/M2 | SYSTOLIC BLOOD PRESSURE: 132 MMHG | DIASTOLIC BLOOD PRESSURE: 84 MMHG

## 2022-03-02 DIAGNOSIS — E88.810 METABOLIC SYNDROME: ICD-10-CM

## 2022-03-02 DIAGNOSIS — E78.2 MIXED HYPERLIPIDEMIA: ICD-10-CM

## 2022-03-02 DIAGNOSIS — E66.01 MORBID OBESITY (H): ICD-10-CM

## 2022-03-02 DIAGNOSIS — R93.1 ABNORMAL ECHOCARDIOGRAM: ICD-10-CM

## 2022-03-02 DIAGNOSIS — R01.1 UNDIAGNOSED CARDIAC MURMURS: ICD-10-CM

## 2022-03-02 DIAGNOSIS — I49.9 CARDIAC ARRHYTHMIA, UNSPECIFIED CARDIAC ARRHYTHMIA TYPE: ICD-10-CM

## 2022-03-02 PROCEDURE — 99203 OFFICE O/P NEW LOW 30 MIN: CPT | Performed by: INTERNAL MEDICINE

## 2022-03-02 PROCEDURE — 93000 ELECTROCARDIOGRAM COMPLETE: CPT | Performed by: INTERNAL MEDICINE

## 2022-03-02 NOTE — PROGRESS NOTES
HPI and Plan:   See dictation      Orders Placed This Encounter   Procedures     EKG 12-lead complete w/read - Clinics (performed today)       No orders of the defined types were placed in this encounter.      There are no discontinued medications.      Encounter Diagnoses   Name Primary?     Morbid obesity (H)      Metabolic syndrome      Mixed hyperlipidemia      Cardiac arrhythmia, unspecified cardiac arrhythmia type      Undiagnosed cardiac murmurs      Abnormal echocardiogram        CURRENT MEDICATIONS:  Current Outpatient Medications   Medication Sig Dispense Refill     diazepam (VALIUM) 2 MG tablet Take one up to twice daily for anxiety 10 tablet 0     magnesium 250 MG tablet Take 1 tablet by mouth daily       UNABLE TO FIND MEDICATION NAME: Tart Cherry with celery seed       rizatriptan (MAXALT-MLT) 10 MG ODT rizatriptan 10 mg disintegrating tablet   Take by oral route. (Patient not taking: Reported on 3/2/2022)         ALLERGIES     Allergies   Allergen Reactions     Celebrex [Celecoxib] Nausea and Vomiting     Allopurinol Hives     Aspirin Diarrhea, Fatigue, GI Disturbance, Nausea and Nausea and Vomiting     Ibuprofen GI Disturbance     2 days of gi upset after taking any NSAID     Indomethacin        PAST MEDICAL HISTORY:  Past Medical History:   Diagnosis Date     Contact dermatitis and other eczema, due to unspecified cause      Gastric ulcer 2014     LBBB (left bundle branch block)      Migraine, unspecified, without mention of intractable migraine without mention of status migrainosus      Mild intermittent asthma      Obese      Plantar fascial fibromatosis        PAST SURGICAL HISTORY:  Past Surgical History:   Procedure Laterality Date     ARTHROPLASTY HIP ANTERIOR Right 10/15/2019    Procedure: RIGHT TOTAL HIP ARTHROPLASTY DIRECT ANTERIOR APPROACH  ;  Surgeon: Simon Pitts MD;  Location: [facility] OR     ARTHROPLASTY KNEE  4/23/2013    Procedure: ARTHROPLASTY KNEE;  RIGHT TOTAL KNEE ARTHROPLASTY  (IFEOMA)^;  Surgeon: Simon Pitts MD;  Location: [facility] OR     ARTHROPLASTY KNEE  7/17/2014    Procedure: ARTHROPLASTY KNEE;  Surgeon: Simon Pitts MD;  Location:  OR     AS REPAIR FEMORAL HERNIA,REDUCIBLE  1991    hernia repair femoral     ESOPHAGOSCOPY, GASTROSCOPY, DUODENOSCOPY (EGD), COMBINED  7/22/2014    Procedure: COMBINED ESOPHAGOSCOPY, GASTROSCOPY, DUODENOSCOPY (EGD), BIOPSY SINGLE OR MULTIPLE;  Surgeon: Natalie Gibbons MD;  Location:  GI     OSTEOTOMY FOOT Left 5/13/2019    Procedure: LEFT SECOND METATARSALPHALANGEAL CAPSULE REPAIR ; SECOND AND THIRD METATARSAL  OSTEOTOMY;  Surgeon: Christina Durán MD;  Location:  OR     PLACEMENT OF DENTAL IMPLANT(S)  2014     TONSILLECTOMY & ADENOIDECTOMY  1966    age 6       FAMILY HISTORY:  Family History   Problem Relation Age of Onset     Osteoporosis Mother      Ovarian Cancer Mother 35     Hypertension Mother      Leukemia Mother      Cancer Father         kidney     C.A.D. Father         early disease     Gout Father      Macular Degeneration Brother      Factor V Leiden deficiency Brother      Polycythemia Brother      Lipids Brother      Neurologic Disorder Maternal Grandmother         TIA's     Diabetes Maternal Grandmother      Depression Daughter      Anxiety Disorder Daughter      Breast Cancer Maternal Aunt      Breast Cancer Paternal Aunt      Cancer - colorectal No family hx of      Anesthesia Reaction No family hx of      Crohn's Disease No family hx of      Ulcerative Colitis No family hx of      Colon Polyps No family hx of        SOCIAL HISTORY:  Social History     Socioeconomic History     Marital status:      Spouse name: None     Number of children: 1     Years of education: 18     Highest education level: None   Occupational History     Employer: OTHER     Comment: [employer]   Tobacco Use     Smoking status: Former Smoker     Packs/day: 1.00     Years: 5.00     Pack years: 5.00     Quit date: 3/20/1988      "Years since quittin.9     Smokeless tobacco: Never Used   Substance and Sexual Activity     Alcohol use: Yes     Alcohol/week: 2.0 standard drinks     Types: 2 Standard drinks or equivalent per week     Comment: 1 drink - a month     Drug use: No     Sexual activity: Not Currently     Birth control/protection: Post-menopausal   Other Topics Concern      Service Not Asked     Blood Transfusions Not Asked     Caffeine Concern Yes     Comment: 3 cups per week      Occupational Exposure Not Asked     Hobby Hazards Not Asked     Sleep Concern Not Asked     Stress Concern Not Asked     Weight Concern Not Asked     Special Diet No     Comment: organic     Back Care Not Asked     Exercise Yes     Comment: walk dog, swimming      Bike Helmet Not Asked     Seat Belt Yes     Self-Exams No     Parent/sibling w/ CABG, MI or angioplasty before 65F 55M? Not Asked   Social History Narrative     None     Social Determinants of Health     Financial Resource Strain: Not on file   Food Insecurity: Not on file   Transportation Needs: Not on file   Physical Activity: Not on file   Stress: Not on file   Social Connections: Not on file   Intimate Partner Violence: Not on file   Housing Stability: Not on file       Review of Systems:  Skin:  Negative       Eyes:  Positive for glasses    ENT:  Negative      Respiratory:  Negative       Cardiovascular:  Negative      Gastroenterology: Negative      Genitourinary:  Negative      Musculoskeletal:  Positive for joint pain;arthritis    Neurologic:  Negative      Psychiatric:  Negative      Heme/Lymph/Imm:  Negative      Endocrine:  Negative        Physical Exam:  Vitals: /84   Pulse 79   Ht 1.676 m (5' 6\")   Wt 133.8 kg (295 lb)   LMP 03/10/2013   BMI 47.61 kg/m      Constitutional:  cooperative, alert and oriented, well developed, well nourished, in no acute distress morbidly obese      Skin:  warm and dry to the touch, no apparent skin lesions or masses noted      "     Head:  normocephalic, no masses or lesions        Eyes:           Lymph:      ENT:  no pallor or cyanosis, dentition good        Neck:  carotid pulses are full and equal bilaterally;no carotid bruit        Respiratory:  normal breath sounds, clear to auscultation, normal A-P diameter, normal symmetry, normal respiratory excursion, no use of accessory muscles         Cardiac: regular rhythm;normal S1 and S2;no S3 or S4;no murmurs, gallops or rubs detected                pulses full and equal                                        GI:    obese      Extremities and Muscular Skeletal:  no edema;no spinal abnormalities noted;normal muscle strength and tone              Neurological:           Psych:           CC  PAUL Smart  [address], [address]  [place] [address]

## 2022-03-02 NOTE — LETTER
3/2/2022    PAUL Smart  [address], [address]  [facility] [address]    RE: Rupal Puri       Dear Colleague,     I had the pleasure of seeing Rupal Puri in the [facility].  HPI and Plan:   See dictation      Orders Placed This Encounter   Procedures     EKG 12-lead complete w/read - Clinics (performed today)       No orders of the defined types were placed in this encounter.      There are no discontinued medications.      Encounter Diagnoses   Name Primary?     Morbid obesity (H)      Metabolic syndrome      Mixed hyperlipidemia      Cardiac arrhythmia, unspecified cardiac arrhythmia type      Undiagnosed cardiac murmurs      Abnormal echocardiogram        CURRENT MEDICATIONS:  Current Outpatient Medications   Medication Sig Dispense Refill     diazepam (VALIUM) 2 MG tablet Take one up to twice daily for anxiety 10 tablet 0     magnesium 250 MG tablet Take 1 tablet by mouth daily       UNABLE TO FIND MEDICATION NAME: Tart Cherry with celery seed       rizatriptan (MAXALT-MLT) 10 MG ODT rizatriptan 10 mg disintegrating tablet   Take by oral route. (Patient not taking: Reported on 3/2/2022)         ALLERGIES     Allergies   Allergen Reactions     Celebrex [Celecoxib] Nausea and Vomiting     Allopurinol Hives     Aspirin Diarrhea, Fatigue, GI Disturbance, Nausea and Nausea and Vomiting     Ibuprofen GI Disturbance     2 days of gi upset after taking any NSAID     Indomethacin        PAST MEDICAL HISTORY:  Past Medical History:   Diagnosis Date     Contact dermatitis and other eczema, due to unspecified cause      Gastric ulcer 2014     LBBB (left bundle branch block)      Migraine, unspecified, without mention of intractable migraine without mention of status migrainosus      Mild intermittent asthma      Obese      Plantar fascial fibromatosis        PAST SURGICAL HISTORY:  Past Surgical History:   Procedure Laterality Date     ARTHROPLASTY HIP ANTERIOR Right  10/15/2019    Procedure: RIGHT TOTAL HIP ARTHROPLASTY DIRECT ANTERIOR APPROACH  ;  Surgeon: Simon Pitts MD;  Location: [facility] OR     ARTHROPLASTY KNEE  4/23/2013    Procedure: ARTHROPLASTY KNEE;  RIGHT TOTAL KNEE ARTHROPLASTY (IFEOMA)^;  Surgeon: Simon Pitts MD;  Location: [facility]     ARTHROPLASTY KNEE  7/17/2014    Procedure: ARTHROPLASTY KNEE;  Surgeon: Simon Pitts MD;  Location: [facility] OR     AS REPAIR FEMORAL HERNIA,REDUCIBLE  1991    hernia repair femoral     ESOPHAGOSCOPY, GASTROSCOPY, DUODENOSCOPY (EGD), COMBINED  7/22/2014    Procedure: COMBINED ESOPHAGOSCOPY, GASTROSCOPY, DUODENOSCOPY (EGD), BIOPSY SINGLE OR MULTIPLE;  Surgeon: Natalie Gibbons MD;  Location:  GI     OSTEOTOMY FOOT Left 5/13/2019    Procedure: LEFT SECOND METATARSALPHALANGEAL CAPSULE REPAIR ; SECOND AND THIRD METATARSAL  OSTEOTOMY;  Surgeon: Christina Durán MD;  Location:  OR     PLACEMENT OF DENTAL IMPLANT(S)  2014     TONSILLECTOMY & ADENOIDECTOMY  1966    age 6       FAMILY HISTORY:  Family History   Problem Relation Age of Onset     Osteoporosis Mother      Ovarian Cancer Mother 35     Hypertension Mother      Leukemia Mother      Cancer Father         kidney     C.A.D. Father         early disease     Gout Father      Macular Degeneration Brother      Factor V Leiden deficiency Brother      Polycythemia Brother      Lipids Brother      Neurologic Disorder Maternal Grandmother         TIA's     Diabetes Maternal Grandmother      Depression Daughter      Anxiety Disorder Daughter      Breast Cancer Maternal Aunt      Breast Cancer Paternal Aunt      Cancer - colorectal No family hx of      Anesthesia Reaction No family hx of      Crohn's Disease No family hx of      Ulcerative Colitis No family hx of      Colon Polyps No family hx of        SOCIAL HISTORY:  Social History     Socioeconomic History     Marital status:      Spouse name: None     Number of children: 1     Years of education: 18     Highest  "education level: None   Occupational History     Employer: OTHER     Comment: [employer]   Tobacco Use     Smoking status: Former Smoker     Packs/day: 1.00     Years: 5.00     Pack years: 5.00     Quit date: 3/20/1988     Years since quittin.9     Smokeless tobacco: Never Used   Substance and Sexual Activity     Alcohol use: Yes     Alcohol/week: 2.0 standard drinks     Types: 2 Standard drinks or equivalent per week     Comment: 1 drink - a month     Drug use: No     Sexual activity: Not Currently     Birth control/protection: Post-menopausal   Other Topics Concern      Service Not Asked     Blood Transfusions Not Asked     Caffeine Concern Yes     Comment: 3 cups per week      Occupational Exposure Not Asked     Hobby Hazards Not Asked     Sleep Concern Not Asked     Stress Concern Not Asked     Weight Concern Not Asked     Special Diet No     Comment: organic     Back Care Not Asked     Exercise Yes     Comment: walk dog, swimming      Bike Helmet Not Asked     Seat Belt Yes     Self-Exams No     Parent/sibling w/ CABG, MI or angioplasty before 65F 55M? Not Asked   Social History Narrative     None     Social Determinants of Health     Financial Resource Strain: Not on file   Food Insecurity: Not on file   Transportation Needs: Not on file   Physical Activity: Not on file   Stress: Not on file   Social Connections: Not on file   Intimate Partner Violence: Not on file   Housing Stability: Not on file       Review of Systems:  Skin:  Negative       Eyes:  Positive for glasses    ENT:  Negative      Respiratory:  Negative       Cardiovascular:  Negative      Gastroenterology: Negative      Genitourinary:  Negative      Musculoskeletal:  Positive for joint pain;arthritis    Neurologic:  Negative      Psychiatric:  Negative      Heme/Lymph/Imm:  Negative      Endocrine:  Negative        Physical Exam:  Vitals: /84   Pulse 79   Ht 1.676 m (5' 6\")   Wt 133.8 kg (295 lb)   LMP " 03/10/2013   BMI 47.61 kg/m      Constitutional:  cooperative, alert and oriented, well developed, well nourished, in no acute distress morbidly obese      Skin:  warm and dry to the touch, no apparent skin lesions or masses noted          Head:  normocephalic, no masses or lesions        Eyes:           Lymph:      ENT:  no pallor or cyanosis, dentition good        Neck:  carotid pulses are full and equal bilaterally;no carotid bruit        Respiratory:  normal breath sounds, clear to auscultation, normal A-P diameter, normal symmetry, normal respiratory excursion, no use of accessory muscles         Cardiac: regular rhythm;normal S1 and S2;no S3 or S4;no murmurs, gallops or rubs detected                pulses full and equal                                        GI:    obese      Extremities and Muscular Skeletal:  no edema;no spinal abnormalities noted;normal muscle strength and tone              Neurological:           Psych:           CC  PAUL Smart  [address], [address]  [place] [address]  Thank you for allowing me to participate in the care of your patient.      Sincerely,     Sofia Hanies MD     [facility]

## 2022-03-02 NOTE — PROGRESS NOTES
Service Date: 03/02/2022    POOR AUDIO    HISTORY OF PRESENT ILLNESS:  I saw Ms. Puri for evaluation of left bundle branch block.  She is a 61-year-old white female who was recently in preop evaluation for her knee surgery.  She was found to have left bundle branch block and was recommended to have Cardiology evaluation.  Symptomatically, she has no fatigue, dizziness, shortness of breath or fatigue.  She has been working hard for weight reduction recently.  She has no chest pain.    She has no previous cardiovascular problems.  She has a history of a gastric ulcer and mild asthma.  She does have arthritis and is planned for further surgical treatment.    The patient was actually noticed to have a left bundle branch block in 2016.  She had echocardiography in December of last year that reported normal cardiac structure and function.  Her QRS has remained relatively unchanged from 2016 to the present.    She has no family history of cardiomyopathy.    PHYSICAL EXAMINATION:    VITAL SIGNS:  Blood pressure was 132/84, heart rate 79 beats per minute, body weight 295 pounds.  HEENT:  The eyes and ENT were unremarkable.  LUNGS:  Clear.  CARDIAC:  Rhythm was regular.  Heart sounds were normal without murmur.  ABDOMEN:  Severe obesity.  EXTREMITIES:  There was no pedal edema.    ASSESSMENT AND RECOMMENDATIONS:  Ms. Puri is a 61-year-old white female who has left bundle branch block but no clinical symptoms.  The echo is normal.  There is no other etiology for left bundle branch block at the present time.  At this point, I do not recommend further investigation for the etilogy of left bundle branch block.  No medication is necessary for the condition. She does not need further cardiac testing for her orthopedic surgery.  She does not require routine Cardiology followup either.  If she has any symptoms of shortness of breath, fatigue, chest pain, dizziness or syncope, she can contact us for further  assistance.    Sofia Haines MD    cc:  Denia Salomon PA-C  [facility],  [address] [place] [address]    Sofia Haines MD        D: 2022   T: 2022   MT: [email or web address]    Name:     DOTTY HARP  MRN:      [id number]        Account:      [id number]   :      1960           Service Date: 2022       Document: [id number]

## 2022-03-22 PROBLEM — I44.7 LBBB (LEFT BUNDLE BRANCH BLOCK): Status: ACTIVE | Noted: 2022-03-22

## 2022-05-09 ENCOUNTER — OFFICE VISIT (OUTPATIENT)
Dept: FAMILY MEDICINE | Facility: CLINIC | Age: 62
End: 2022-05-09

## 2022-05-09 VITALS
WEIGHT: 290.8 LBS | SYSTOLIC BLOOD PRESSURE: 130 MMHG | HEIGHT: 66 IN | BODY MASS INDEX: 46.73 KG/M2 | HEART RATE: 65 BPM | TEMPERATURE: 97.7 F | DIASTOLIC BLOOD PRESSURE: 78 MMHG | OXYGEN SATURATION: 97 %

## 2022-05-09 DIAGNOSIS — M10.9 ACUTE GOUTY ARTHRITIS: ICD-10-CM

## 2022-05-09 DIAGNOSIS — R73.01 ELEVATED FASTING GLUCOSE: ICD-10-CM

## 2022-05-09 DIAGNOSIS — R01.1 UNDIAGNOSED CARDIAC MURMURS: ICD-10-CM

## 2022-05-09 DIAGNOSIS — Z01.818 PREOPERATIVE EXAMINATION: Primary | ICD-10-CM

## 2022-05-09 DIAGNOSIS — E66.01 MORBID OBESITY (H): ICD-10-CM

## 2022-05-09 DIAGNOSIS — E78.2 MIXED HYPERLIPIDEMIA: ICD-10-CM

## 2022-05-09 DIAGNOSIS — G43.009 MIGRAINE WITHOUT AURA AND WITHOUT STATUS MIGRAINOSUS, NOT INTRACTABLE: ICD-10-CM

## 2022-05-09 DIAGNOSIS — M25.571 PAIN IN JOINT, ANKLE AND FOOT, RIGHT: ICD-10-CM

## 2022-05-09 DIAGNOSIS — I44.7 LBBB (LEFT BUNDLE BRANCH BLOCK): ICD-10-CM

## 2022-05-09 LAB
% GRANULOCYTES: 63 %
BUN SERPL-MCNC: 16 MG/DL (ref 7–25)
BUN/CREATININE RATIO: 17.4 (ref 6–22)
CALCIUM SERPL-MCNC: 8.9 MG/DL (ref 8.6–10.3)
CHLORIDE SERPLBLD-SCNC: 108.5 MMOL/L (ref 98–110)
CREAT SERPL-MCNC: 0.92 MG/DL (ref 0.6–1.3)
GLUCOSE SERPL-MCNC: 75 MG/DL (ref 60–99)
HCT VFR BLD AUTO: 40.4 % (ref 35–47)
HEMOGLOBIN: 13.2 G/DL (ref 11.7–15.7)
LYMPHOCYTES NFR BLD AUTO: 28.1 %
MCH RBC QN AUTO: 31.1 PG (ref 26–33)
MCHC RBC AUTO-ENTMCNC: 32.7 G/DL (ref 31–36)
MCV RBC AUTO: 95.4 FL (ref 78–100)
MONOCYTES NFR BLD AUTO: 8.9 %
PLATELET COUNT - QUEST: 210 10^9/L (ref 150–375)
POTASSIUM SERPL-SCNC: 4.77 MMOL/L (ref 3.5–5.3)
RBC # BLD AUTO: 4.24 10*12/L (ref 3.8–5.2)
SODIUM SERPL-SCNC: 140.7 MMOL/L (ref 135–146)
WBC # BLD AUTO: 7.4 10*9/L (ref 4–11)

## 2022-05-09 PROCEDURE — 99204 OFFICE O/P NEW MOD 45 MIN: CPT | Performed by: FAMILY MEDICINE

## 2022-05-09 PROCEDURE — 36415 COLL VENOUS BLD VENIPUNCTURE: CPT | Performed by: FAMILY MEDICINE

## 2022-05-09 PROCEDURE — 85025 COMPLETE CBC W/AUTO DIFF WBC: CPT | Performed by: FAMILY MEDICINE

## 2022-05-09 PROCEDURE — 80048 BASIC METABOLIC PNL TOTAL CA: CPT | Performed by: FAMILY MEDICINE

## 2022-05-09 NOTE — NURSING NOTE
Chief Complaint   Patient presents with     Pre-Op Exam     Right ankle arthroscopy at [facility] on 05/25/22 with Dr. Durán

## 2022-05-09 NOTE — LETTER
[facility]  [address] [place] [address]  [address]  [facility] [address]  Phone: [phone]  Fax: [phone]  Primary Provider: Denia Salomon  Pre-op Performing Provider: NERISSA MAZA      PREOPERATIVE EVALUATION:  Today's date: 2022    Rupal Puri is a 62 year old female who presents for a preoperative evaluation.( 62)    Surgical Information:  Surgery/Procedure: Right ankle arthroscopy   Surgery Location: [facility]   Surgeon: Dr. Durán  Surgery Date: 22  Time of Surgery: AM  Where patient plans to recover: At home with family  Fax number for surgical facility:     Type of Anesthesia Anticipated: to be determined    Assessment & Plan     The proposed surgical procedure is considered INTERMEDIATE risk.    Problem List Items Addressed This Visit        Nervous and Auditory    Migraine without aura and without status migrainosus, not intractable       Digestive    Morbid obesity (H)       Endocrine    Mixed hyperlipidemia    Acute gouty arthritis    Elevated fasting glucose       Circulatory    Undiagnosed cardiac murmurs    LBBB (left bundle branch block)      Other Visit Diagnoses     Preoperative examination    -  Primary    Relevant Orders    Basic Metabolic Panel (BFP) (Completed)    HEMOGRAM PLATELET DIFF (BFP) (Completed)    VENOUS COLLECTION (Completed)    Pain in joint, ankle and foot, right              1. Preoperative examination    - Basic Metabolic Panel (BFP)  - HEMOGRAM PLATELET DIFF (BFP)  - VENOUS COLLECTION    2. Pain in joint, ankle and foot, right      3. Migraine without aura and without status migrainosus, not intractable      4. Morbid obesity (H)      5. Mixed hyperlipidemia      6. Elevated fasting glucose      7. Acute gouty arthritis      8. Undiagnosed cardiac murmurs      9. LBBB (left bundle branch block)  Per cardiology notes :    ASSESSMENT AND RECOMMENDATIONS:  Ms. Puri is a 61-year-old white female  who has left bundle branch block but no clinical symptoms.  The echo is normal.  There is no other etiology for left bundle branch block at the present time.  At this point, I do not recommend further investigation for the etilogy of left bundle branch block.  No medication is necessary for the condition. She does not need further cardiac testing for her orthopedic surgery.  She does not require routine Cardiology followup either.  If she has any symptoms of shortness of breath, fatigue, chest pain, dizziness or syncope, she can contact us for further assistance.     Sofia Haines MD    Risks and Recommendations:  The patient has the following additional risks and recommendations for perioperative complications:   - No identified additional risk factors other than previously addressed    Medication Instructions:  Patient is on no chronic medications    RECOMMENDATION:  APPROVAL GIVEN to proceed with proposed procedure, without further diagnostic evaluation.        Subjective     HPI related to upcoming procedure:  Here for preop for arthroscopic surgery right ankle. He will tighten ligaments, remove bone spur and clean out the joint. Symptoms include pain, couldn't walk. Has had steroid injections. Still has pain.    1. No - Have you ever had a heart attack or stroke?  2. No - Have you ever had surgery on your heart or blood vessels, such as a stent, coronary (heart) bypass, or surgery on an artery in the head, neck, heart, or legs?  3. No - Do you have chest pain when you are physically active?  4. No - Do you have a history of heart failure?  5. No - Do you currently have a cold, bronchitis, or symptoms of other respiratory (head and chest) infections?  6. No - Do you have a cough, shortness of breath, or wheezing?  7. Yes - Do you or anyone in your family have a history of blood clots? Dad with blood clots, unknown cause. Brother with factor V leiden, patient was tested and negative.  8. No - Do you or anyone in  your family have a serious bleeding problem, such as long-lasting bleeding after surgeries or cuts?  9. No - Have you ever had anemia or been told to take iron pills?  10. No - Have you had any abnormal blood loss such as black, tarry or bloody stools, or abnormal vaginal bleeding?  11. No - Have you ever had a blood transfusion?  12. Yes - Are you willing to have a blood transfusion if it is medically needed before, during, or after your surgery?  13. No - Have you or anyone in your family ever had problems with anesthesia (sedation for surgery)?  14. Yes - Do you have sleep apnea, excessive snoring, or daytime drowsiness? Yes, sleep apnea Do you have a CPAP machine? Yes, daily  15. No - Do you have any artifical heart valves or other implanted medical devices, such as a pacemaker, defibrillator, or continuous glucose monitor?  16. Yes - Do you have any artifical joints? 3 joints: 2 knees and right hip  17. No - Are you allergic to latex?  18. No - Is there any chance that you may be pregnant?    Health Care Directive:  Patient does not have a Health Care Directive or Living Will: Discussed advance care planning with patient; information given to patient to review.    Preoperative Review of :   reviewed - diazepam prescribed in 12.2021 small quantity      Status of Chronic Conditions:  HYPERLIPIDEMIA - Patient has a long history of significant Hyperlipidemia requiring medication for treatment with recent good control. Patient reports is not on medications.     Review of Systems  CONSTITUTIONAL: NEGATIVE for fever, chills, change in weight  INTEGUMENTARY/SKIN: NEGATIVE for worrisome rashes, moles or lesions  EYES: NEGATIVE for vision changes or irritation  ENT/MOUTH: NEGATIVE for ear, mouth and throat problems  RESP: NEGATIVE for significant cough or SOB  CV: NEGATIVE for chest pain, palpitations or peripheral edema  GI: NEGATIVE for nausea, abdominal pain, heartburn, or change in bowel habits  : NEGATIVE  for frequency, dysuria, or hematuria  MUSCULOSKELETAL: NEGATIVE for significant arthralgias or myalgia  NEURO: NEGATIVE for weakness, dizziness or paresthesias  ENDOCRINE: NEGATIVE for temperature intolerance, skin/hair changes  HEME: NEGATIVE for bleeding problems  PSYCHIATRIC: NEGATIVE for changes in mood or affect    Patient Active Problem List    Diagnosis Date Noted     LBBB (left bundle branch block) 03/22/2022     Priority: Medium     Stable - echo  Cardiology work up completed - no Follow up needed if continues to be asymptomatic 3/22       Cardiac arrhythmia, unspecified cardiac arrhythmia type 12/17/2021     Priority: Medium     Undiagnosed cardiac murmurs 12/17/2021     Priority: Medium     Elevated fasting glucose 12/17/2021     Priority: Medium     Acute gouty arthritis 11/11/2020     Priority: Medium     Status post right hip replacement 10/15/2019     Priority: Medium     Morbid obesity (H) 09/07/2018     Priority: Medium     Migraine without aura and without status migrainosus, not intractable 01/05/2018     Priority: Medium     Internal hemorrhoids - per 2017 colonoscopy 03/06/2017     Priority: Medium     Benign neoplasm of cecum 2017 colonoscopy - repeat 2022 03/06/2017     Priority: Medium     Shoulder pain 07/08/2016     Priority: Medium     History of GI bleed 2014 07/22/2014     Priority: Medium     Post ortho surgery while on Lovenox - EGD completed       Health Care Home 03/13/2013     Priority: Medium     State Tier Level:  Tier 2  Status:  NA  Care Coordinator:     See Letters for [facility] Care Plan           Mixed hyperlipidemia 12/22/2010     Priority: Medium     Metabolic syndrome 12/22/2010     Priority: Medium     Obesity, high TG's and low HDL, fasting glucose borderline       ACP (advance care planning) 01/14/2016     Priority: Low      Past Medical History:   Diagnosis Date     Contact dermatitis and other eczema, due to unspecified cause      Gastric ulcer 2014     LBBB (left bundle  branch block)      Migraine, unspecified, without mention of intractable migraine without mention of status migrainosus      Mild intermittent asthma      Obese      Plantar fascial fibromatosis      Past Surgical History:   Procedure Laterality Date     ARTHROPLASTY HIP ANTERIOR Right 10/15/2019    Procedure: RIGHT TOTAL HIP ARTHROPLASTY DIRECT ANTERIOR APPROACH  ;  Surgeon: Simon Pitts MD;  Location:  OR     ARTHROPLASTY KNEE  4/23/2013    Procedure: ARTHROPLASTY KNEE;  RIGHT TOTAL KNEE ARTHROPLASTY (IFEOMA)^;  Surgeon: Simon Pitts MD;  Location:  OR     ARTHROPLASTY KNEE  7/17/2014    Procedure: ARTHROPLASTY KNEE;  Surgeon: Simon Pitts MD;  Location:  OR     AS REPAIR FEMORAL HERNIA,REDUCIBLE  1991    hernia repair femoral     ESOPHAGOSCOPY, GASTROSCOPY, DUODENOSCOPY (EGD), COMBINED  7/22/2014    Procedure: COMBINED ESOPHAGOSCOPY, GASTROSCOPY, DUODENOSCOPY (EGD), BIOPSY SINGLE OR MULTIPLE;  Surgeon: Natalie Gibbons MD;  Location:  GI     OSTEOTOMY FOOT Left 5/13/2019    Procedure: LEFT SECOND METATARSALPHALANGEAL CAPSULE REPAIR ; SECOND AND THIRD METATARSAL  OSTEOTOMY;  Surgeon: Christina Durán MD;  Location:  OR     PLACEMENT OF DENTAL IMPLANT(S)  2014     TONSILLECTOMY & ADENOIDECTOMY  1966    age 6     Current Outpatient Medications   Medication Sig Dispense Refill     diazepam (VALIUM) 2 MG tablet Take one up to twice daily for anxiety 10 tablet 0     magnesium 250 MG tablet Take 1 tablet by mouth daily       UNABLE TO FIND MEDICATION NAME: Tart Cherry with celery seed         Allergies   Allergen Reactions     Celebrex [Celecoxib] Nausea and Vomiting     Allopurinol Hives     Aspirin Diarrhea, Fatigue, GI Disturbance, Nausea and Nausea and Vomiting     Ibuprofen GI Disturbance     2 days of gi upset after taking any NSAID     Indomethacin         Social History     Tobacco Use     Smoking status: Former Smoker     Packs/day: 1.00     Years: 5.00     Pack years: 5.00      "Quit date: 3/20/1988     Years since quittin.1     Smokeless tobacco: Never Used   Substance Use Topics     Alcohol use: Yes     Alcohol/week: 2.0 standard drinks     Types: 2 Standard drinks or equivalent per week     Comment: 1 drink - a month     Family History   Problem Relation Age of Onset     Osteoporosis Mother      Ovarian Cancer Mother 35     Hypertension Mother      Leukemia Mother      Cancer Father         kidney     C.A.D. Father         early disease     Gout Father      Macular Degeneration Brother      Factor V Leiden deficiency Brother      Polycythemia Brother      Lipids Brother      Neurologic Disorder Maternal Grandmother         TIA's     Diabetes Maternal Grandmother      Depression Daughter      Anxiety Disorder Daughter      Breast Cancer Maternal Aunt      Breast Cancer Paternal Aunt      Cancer - colorectal No family hx of      Anesthesia Reaction No family hx of      Crohn's Disease No family hx of      Ulcerative Colitis No family hx of      Colon Polyps No family hx of      History   Drug Use No         Objective     /78 (BP Location: Left arm, Patient Position: Sitting, Cuff Size: Adult Large)   Pulse 65   Temp 97.7  F (36.5  C) (Temporal)   Ht 1.676 m (5' 6\")   Wt 131.9 kg (290 lb 12.8 oz)   LMP 03/10/2013   SpO2 97%   BMI 46.94 kg/m      Physical Exam    GENERAL APPEARANCE: healthy, alert and no distress     EYES: EOMI, PERRL     HENT: ear canals and TM's normal and nose and mouth without ulcers or lesions     NECK: no adenopathy, no asymmetry, masses, or scars and thyroid normal to palpation     RESP: lungs clear to auscultation - no rales, rhonchi or wheezes     CV: regular rates and rhythm, normal S1 S2, no S3 or S4 and no murmur, click or rub     ABDOMEN:  soft, nontender, no HSM or masses and bowel sounds normal     MS: extremities normal- no gross deformities noted, no evidence of inflammation in joints, FROM in all extremities.     SKIN: no suspicious " lesions or rashes     NEURO: Normal strength and tone, sensory exam grossly normal, mentation intact and speech normal     PSYCH: mentation appears normal. and affect normal/bright     LYMPHATICS: No cervical adenopathy    Recent Labs   Lab Test 12/17/21  1152 12/17/21  0000   HGB 13.4  13.2  --      223  --    A1C  --  5.9        Diagnostics:  Recent Results (from the past 168 hour(s))   Basic Metabolic Panel (BFP)    Collection Time: 05/09/22 12:00 AM   Result Value Ref Range    Creatinine 0.92 0.60 - 1.30 mg/dL    Glucose 75 60 - 99 mg/dL    Sodium 140.7 135 - 146 mmol/L    Potassium 4.77 3.5 - 5.3 mmol/L    Chloride 108.5 98 - 110 mmol/L    Urea Nitrogen 16 7 - 25 mg/dL    Calcium 8.9 8.6 - 10.3 mg/dL    BUN/Creatinine Ratio 17.4 6 - 22   HEMOGRAM PLATELET DIFF (BFP)    Collection Time: 05/09/22 12:00 AM   Result Value Ref Range    WBC 7.4 4.0 - 11 10*9/L    RBC Count 4.24 3.8 - 5.2 10*12/L    Hemoglobin 13.2 11.7 - 15.7 g/dL    Hematocrit 40.4 35.0 - 47.0 %    MCV 95.4 78 - 100 fL    MCH 31.1 26 - 33 pg    MCHC 32.7 31 - 36 g/dL    Platelet Count 210 150 - 375 10^9/L    % Granulocytes 63.0 %    % Lymphocytes 28.1 %    % Monocytes 8.9 %      EKG: Sinus  Rhythm  -With a PAC    Revised Cardiac Risk Index (RCRI):  The patient has the following serious cardiovascular risks for perioperative complications:   - No serious cardiac risks = 0 points     RCRI Interpretation: 0 points: Class I (very low risk - 0.4% complication rate)           Signed Electronically by: Davida Graves MD  Copy of this evaluation report is provided to requesting physician.

## 2022-05-09 NOTE — PROGRESS NOTES
[facility] PHYSICIANS  [address] [place] [address]  [address]  [facility] [address]  Phone: [phone]  Fax: [phone]  Primary Provider: Denia Salomon  Pre-op Performing Provider: NERISSA MAZA      PREOPERATIVE EVALUATION:  Today's date: 2022    Rupal Puri is a 62 year old female who presents for a preoperative evaluation.( 62)    Surgical Information:  Surgery/Procedure: Right ankle arthroscopy   Surgery Location: [facility]   Surgeon: Dr. Durán  Surgery Date: 22  Time of Surgery: AM  Where patient plans to recover: At home with family  Fax number for surgical facility:     Type of Anesthesia Anticipated: to be determined    Assessment & Plan     The proposed surgical procedure is considered INTERMEDIATE risk.    Problem List Items Addressed This Visit        Nervous and Auditory    Migraine without aura and without status migrainosus, not intractable       Digestive    Morbid obesity (H)       Endocrine    Mixed hyperlipidemia    Acute gouty arthritis    Elevated fasting glucose       Circulatory    Undiagnosed cardiac murmurs    LBBB (left bundle branch block)      Other Visit Diagnoses     Preoperative examination    -  Primary    Relevant Orders    Basic Metabolic Panel (BFP) (Completed)    HEMOGRAM PLATELET DIFF (BFP) (Completed)    VENOUS COLLECTION (Completed)    Pain in joint, ankle and foot, right              1. Preoperative examination    - Basic Metabolic Panel (BFP)  - HEMOGRAM PLATELET DIFF (BFP)  - VENOUS COLLECTION    2. Pain in joint, ankle and foot, right      3. Migraine without aura and without status migrainosus, not intractable      4. Morbid obesity (H)      5. Mixed hyperlipidemia      6. Elevated fasting glucose      7. Acute gouty arthritis      8. Undiagnosed cardiac murmurs      9. LBBB (left bundle branch block)  Per cardiology notes :    ASSESSMENT AND RECOMMENDATIONS:  Ms. Puri is a 61-year-old white female  who has left bundle branch block but no clinical symptoms.  The echo is normal.  There is no other etiology for left bundle branch block at the present time.  At this point, I do not recommend further investigation for the etilogy of left bundle branch block.  No medication is necessary for the condition. She does not need further cardiac testing for her orthopedic surgery.  She does not require routine Cardiology followup either.  If she has any symptoms of shortness of breath, fatigue, chest pain, dizziness or syncope, she can contact us for further assistance.     Sofia Haines MD    Risks and Recommendations:  The patient has the following additional risks and recommendations for perioperative complications:   - No identified additional risk factors other than previously addressed    Medication Instructions:  Patient is on no chronic medications    RECOMMENDATION:  APPROVAL GIVEN to proceed with proposed procedure, without further diagnostic evaluation.        Subjective     HPI related to upcoming procedure:  Here for preop for arthroscopic surgery right ankle. He will tighten ligaments, remove bone spur and clean out the joint. Symptoms include pain, couldn't walk. Has had steroid injections. Still has pain.    1. No - Have you ever had a heart attack or stroke?  2. No - Have you ever had surgery on your heart or blood vessels, such as a stent, coronary (heart) bypass, or surgery on an artery in the head, neck, heart, or legs?  3. No - Do you have chest pain when you are physically active?  4. No - Do you have a history of heart failure?  5. No - Do you currently have a cold, bronchitis, or symptoms of other respiratory (head and chest) infections?  6. No - Do you have a cough, shortness of breath, or wheezing?  7. Yes - Do you or anyone in your family have a history of blood clots? Dad with blood clots, unknown cause. Brother with factor V leiden, patient was tested and negative.  8. No - Do you or anyone in  your family have a serious bleeding problem, such as long-lasting bleeding after surgeries or cuts?  9. No - Have you ever had anemia or been told to take iron pills?  10. No - Have you had any abnormal blood loss such as black, tarry or bloody stools, or abnormal vaginal bleeding?  11. No - Have you ever had a blood transfusion?  12. Yes - Are you willing to have a blood transfusion if it is medically needed before, during, or after your surgery?  13. No - Have you or anyone in your family ever had problems with anesthesia (sedation for surgery)?  14. Yes - Do you have sleep apnea, excessive snoring, or daytime drowsiness? Yes, sleep apnea Do you have a CPAP machine? Yes, daily  15. No - Do you have any artifical heart valves or other implanted medical devices, such as a pacemaker, defibrillator, or continuous glucose monitor?  16. Yes - Do you have any artifical joints? 3 joints: 2 knees and right hip  17. No - Are you allergic to latex?  18. No - Is there any chance that you may be pregnant?    Health Care Directive:  Patient does not have a Health Care Directive or Living Will: Discussed advance care planning with patient; information given to patient to review.    Preoperative Review of :   reviewed - diazepam prescribed in 12.2021 small quantity      Status of Chronic Conditions:  HYPERLIPIDEMIA - Patient has a long history of significant Hyperlipidemia requiring medication for treatment with recent good control. Patient reports is not on medications.     Review of Systems  CONSTITUTIONAL: NEGATIVE for fever, chills, change in weight  INTEGUMENTARY/SKIN: NEGATIVE for worrisome rashes, moles or lesions  EYES: NEGATIVE for vision changes or irritation  ENT/MOUTH: NEGATIVE for ear, mouth and throat problems  RESP: NEGATIVE for significant cough or SOB  CV: NEGATIVE for chest pain, palpitations or peripheral edema  GI: NEGATIVE for nausea, abdominal pain, heartburn, or change in bowel habits  : NEGATIVE  for frequency, dysuria, or hematuria  MUSCULOSKELETAL: NEGATIVE for significant arthralgias or myalgia  NEURO: NEGATIVE for weakness, dizziness or paresthesias  ENDOCRINE: NEGATIVE for temperature intolerance, skin/hair changes  HEME: NEGATIVE for bleeding problems  PSYCHIATRIC: NEGATIVE for changes in mood or affect    Patient Active Problem List    Diagnosis Date Noted     LBBB (left bundle branch block) 03/22/2022     Priority: Medium     Stable - echo  Cardiology work up completed - no Follow up needed if continues to be asymptomatic 3/22       Cardiac arrhythmia, unspecified cardiac arrhythmia type 12/17/2021     Priority: Medium     Undiagnosed cardiac murmurs 12/17/2021     Priority: Medium     Elevated fasting glucose 12/17/2021     Priority: Medium     Acute gouty arthritis 11/11/2020     Priority: Medium     Status post right hip replacement 10/15/2019     Priority: Medium     Morbid obesity (H) 09/07/2018     Priority: Medium     Migraine without aura and without status migrainosus, not intractable 01/05/2018     Priority: Medium     Internal hemorrhoids - per 2017 colonoscopy 03/06/2017     Priority: Medium     Benign neoplasm of cecum 2017 colonoscopy - repeat 2022 03/06/2017     Priority: Medium     Shoulder pain 07/08/2016     Priority: Medium     History of GI bleed 2014 07/22/2014     Priority: Medium     Post ortho surgery while on Lovenox - EGD completed       Health Care Home 03/13/2013     Priority: Medium     State Tier Level:  Tier 2  Status:  NA  Care Coordinator:     See Letters for [facility] Care Plan           Mixed hyperlipidemia 12/22/2010     Priority: Medium     Metabolic syndrome 12/22/2010     Priority: Medium     Obesity, high TG's and low HDL, fasting glucose borderline       ACP (advance care planning) 01/14/2016     Priority: Low      Past Medical History:   Diagnosis Date     Contact dermatitis and other eczema, due to unspecified cause      Gastric ulcer 2014     LBBB (left bundle  branch block)      Migraine, unspecified, without mention of intractable migraine without mention of status migrainosus      Mild intermittent asthma      Obese      Plantar fascial fibromatosis      Past Surgical History:   Procedure Laterality Date     ARTHROPLASTY HIP ANTERIOR Right 10/15/2019    Procedure: RIGHT TOTAL HIP ARTHROPLASTY DIRECT ANTERIOR APPROACH  ;  Surgeon: Simon Pitts MD;  Location:  OR     ARTHROPLASTY KNEE  4/23/2013    Procedure: ARTHROPLASTY KNEE;  RIGHT TOTAL KNEE ARTHROPLASTY (IFEOMA)^;  Surgeon: Simon Pitts MD;  Location:  OR     ARTHROPLASTY KNEE  7/17/2014    Procedure: ARTHROPLASTY KNEE;  Surgeon: Simon Pitts MD;  Location:  OR     AS REPAIR FEMORAL HERNIA,REDUCIBLE  1991    hernia repair femoral     ESOPHAGOSCOPY, GASTROSCOPY, DUODENOSCOPY (EGD), COMBINED  7/22/2014    Procedure: COMBINED ESOPHAGOSCOPY, GASTROSCOPY, DUODENOSCOPY (EGD), BIOPSY SINGLE OR MULTIPLE;  Surgeon: Natalie Gibbons MD;  Location:  GI     OSTEOTOMY FOOT Left 5/13/2019    Procedure: LEFT SECOND METATARSALPHALANGEAL CAPSULE REPAIR ; SECOND AND THIRD METATARSAL  OSTEOTOMY;  Surgeon: Christina Durán MD;  Location:  OR     PLACEMENT OF DENTAL IMPLANT(S)  2014     TONSILLECTOMY & ADENOIDECTOMY  1966    age 6     Current Outpatient Medications   Medication Sig Dispense Refill     diazepam (VALIUM) 2 MG tablet Take one up to twice daily for anxiety 10 tablet 0     magnesium 250 MG tablet Take 1 tablet by mouth daily       UNABLE TO FIND MEDICATION NAME: Tart Cherry with celery seed         Allergies   Allergen Reactions     Celebrex [Celecoxib] Nausea and Vomiting     Allopurinol Hives     Aspirin Diarrhea, Fatigue, GI Disturbance, Nausea and Nausea and Vomiting     Ibuprofen GI Disturbance     2 days of gi upset after taking any NSAID     Indomethacin         Social History     Tobacco Use     Smoking status: Former Smoker     Packs/day: 1.00     Years: 5.00     Pack years: 5.00      "Quit date: 3/20/1988     Years since quittin.1     Smokeless tobacco: Never Used   Substance Use Topics     Alcohol use: Yes     Alcohol/week: 2.0 standard drinks     Types: 2 Standard drinks or equivalent per week     Comment: 1 drink - a month     Family History   Problem Relation Age of Onset     Osteoporosis Mother      Ovarian Cancer Mother 35     Hypertension Mother      Leukemia Mother      Cancer Father         kidney     C.A.D. Father         early disease     Gout Father      Macular Degeneration Brother      Factor V Leiden deficiency Brother      Polycythemia Brother      Lipids Brother      Neurologic Disorder Maternal Grandmother         TIA's     Diabetes Maternal Grandmother      Depression Daughter      Anxiety Disorder Daughter      Breast Cancer Maternal Aunt      Breast Cancer Paternal Aunt      Cancer - colorectal No family hx of      Anesthesia Reaction No family hx of      Crohn's Disease No family hx of      Ulcerative Colitis No family hx of      Colon Polyps No family hx of      History   Drug Use No         Objective     /78 (BP Location: Left arm, Patient Position: Sitting, Cuff Size: Adult Large)   Pulse 65   Temp 97.7  F (36.5  C) (Temporal)   Ht 1.676 m (5' 6\")   Wt 131.9 kg (290 lb 12.8 oz)   LMP 03/10/2013   SpO2 97%   BMI 46.94 kg/m      Physical Exam    GENERAL APPEARANCE: healthy, alert and no distress     EYES: EOMI, PERRL     HENT: ear canals and TM's normal and nose and mouth without ulcers or lesions     NECK: no adenopathy, no asymmetry, masses, or scars and thyroid normal to palpation     RESP: lungs clear to auscultation - no rales, rhonchi or wheezes     CV: regular rates and rhythm, normal S1 S2, no S3 or S4 and no murmur, click or rub     ABDOMEN:  soft, nontender, no HSM or masses and bowel sounds normal     MS: extremities normal- no gross deformities noted, no evidence of inflammation in joints, FROM in all extremities.     SKIN: no suspicious " lesions or rashes     NEURO: Normal strength and tone, sensory exam grossly normal, mentation intact and speech normal     PSYCH: mentation appears normal. and affect normal/bright     LYMPHATICS: No cervical adenopathy    Recent Labs   Lab Test 12/17/21  1152 12/17/21  0000   HGB 13.4  13.2  --      223  --    A1C  --  5.9        Diagnostics:  Recent Results (from the past 168 hour(s))   Basic Metabolic Panel (BFP)    Collection Time: 05/09/22 12:00 AM   Result Value Ref Range    Creatinine 0.92 0.60 - 1.30 mg/dL    Glucose 75 60 - 99 mg/dL    Sodium 140.7 135 - 146 mmol/L    Potassium 4.77 3.5 - 5.3 mmol/L    Chloride 108.5 98 - 110 mmol/L    Urea Nitrogen 16 7 - 25 mg/dL    Calcium 8.9 8.6 - 10.3 mg/dL    BUN/Creatinine Ratio 17.4 6 - 22   HEMOGRAM PLATELET DIFF (BFP)    Collection Time: 05/09/22 12:00 AM   Result Value Ref Range    WBC 7.4 4.0 - 11 10*9/L    RBC Count 4.24 3.8 - 5.2 10*12/L    Hemoglobin 13.2 11.7 - 15.7 g/dL    Hematocrit 40.4 35.0 - 47.0 %    MCV 95.4 78 - 100 fL    MCH 31.1 26 - 33 pg    MCHC 32.7 31 - 36 g/dL    Platelet Count 210 150 - 375 10^9/L    % Granulocytes 63.0 %    % Lymphocytes 28.1 %    % Monocytes 8.9 %      EKG: Sinus  Rhythm  -With a PAC    Revised Cardiac Risk Index (RCRI):  The patient has the following serious cardiovascular risks for perioperative complications:   - No serious cardiac risks = 0 points     RCRI Interpretation: 0 points: Class I (very low risk - 0.4% complication rate)           Signed Electronically by: Davida Graves MD  Copy of this evaluation report is provided to requesting physician.

## 2022-06-10 ENCOUNTER — TRANSCRIBE ORDERS (OUTPATIENT)
Dept: OTHER | Age: 62
End: 2022-06-10

## 2022-06-10 DIAGNOSIS — S99.911A RIGHT ANKLE INJURY: Primary | ICD-10-CM

## 2022-06-23 PROBLEM — M25.571 PAIN IN JOINT INVOLVING ANKLE AND FOOT, RIGHT: Status: ACTIVE | Noted: 2019-11-21

## 2022-06-23 PROBLEM — Z98.890: Status: ACTIVE | Noted: 2022-06-23

## 2022-07-06 ENCOUNTER — THERAPY VISIT (OUTPATIENT)
Dept: PHYSICAL THERAPY | Facility: CLINIC | Age: 62
End: 2022-07-06
Payer: COMMERCIAL

## 2022-07-06 DIAGNOSIS — Z98.890 S/P REPAIR OF LIGAMENT OF ANKLE: ICD-10-CM

## 2022-07-06 DIAGNOSIS — M25.571 PAIN IN JOINT INVOLVING ANKLE AND FOOT, RIGHT: Primary | ICD-10-CM

## 2022-07-06 PROCEDURE — 97116 GAIT TRAINING THERAPY: CPT | Mod: GP

## 2022-07-06 PROCEDURE — 97110 THERAPEUTIC EXERCISES: CPT | Mod: GP

## 2022-07-11 ENCOUNTER — TELEPHONE (OUTPATIENT)
Dept: CARDIOLOGY | Facility: CLINIC | Age: 62
End: 2022-07-11

## 2022-07-11 NOTE — TELEPHONE ENCOUNTER
[facility] Call Center    Phone Message    May a detailed message be left on voicemail: yes     Reason for Call: Other: Another DrRobbi of the pt's wants her to start taking febuxostat  40 mg  but she wants to check w/Dr. Haines to make sure it's ok for her heart.  Please call pt back to discuss.     Action Taken: Message routed to:  [facility] ([facility] cardio    Travel Screening: Not Applicable

## 2022-07-11 NOTE — TELEPHONE ENCOUNTER
Spoke to pt and made her aware that Dr Haines is out of the office for 2 weeks. Discussed pt speaking to a Pharmacist for recommendations. Pt also will reach out to her PA. Pt would still like Dr Haines messaged and a call back with his response. Gil

## 2022-07-13 ENCOUNTER — THERAPY VISIT (OUTPATIENT)
Dept: PHYSICAL THERAPY | Facility: CLINIC | Age: 62
End: 2022-07-13
Payer: COMMERCIAL

## 2022-07-13 DIAGNOSIS — Z98.890 S/P REPAIR OF LIGAMENT OF ANKLE: ICD-10-CM

## 2022-07-13 DIAGNOSIS — M25.571 PAIN IN JOINT INVOLVING ANKLE AND FOOT, RIGHT: Primary | ICD-10-CM

## 2022-07-13 PROCEDURE — 97110 THERAPEUTIC EXERCISES: CPT | Mod: GP

## 2022-07-13 PROCEDURE — 97530 THERAPEUTIC ACTIVITIES: CPT | Mod: GP

## 2022-07-13 PROCEDURE — 97140 MANUAL THERAPY 1/> REGIONS: CPT | Mod: GP

## 2022-07-22 ENCOUNTER — THERAPY VISIT (OUTPATIENT)
Dept: PHYSICAL THERAPY | Facility: CLINIC | Age: 62
End: 2022-07-22
Payer: COMMERCIAL

## 2022-07-22 DIAGNOSIS — M25.571 PAIN IN JOINT INVOLVING ANKLE AND FOOT, RIGHT: Primary | ICD-10-CM

## 2022-07-22 DIAGNOSIS — Z98.890 S/P REPAIR OF LIGAMENT OF ANKLE: ICD-10-CM

## 2022-07-22 PROCEDURE — 97110 THERAPEUTIC EXERCISES: CPT | Mod: GP

## 2022-07-22 PROCEDURE — 97112 NEUROMUSCULAR REEDUCATION: CPT | Mod: GP

## 2022-07-28 NOTE — TELEPHONE ENCOUNTER
Pt made aware that Ok to take the Febuxostat per Dr Haines. Pt stated understanding and had no questions. Momo

## 2022-08-03 ENCOUNTER — THERAPY VISIT (OUTPATIENT)
Dept: PHYSICAL THERAPY | Facility: CLINIC | Age: 62
End: 2022-08-03
Payer: COMMERCIAL

## 2022-08-03 DIAGNOSIS — Z98.890 S/P REPAIR OF LIGAMENT OF ANKLE: ICD-10-CM

## 2022-08-03 DIAGNOSIS — M25.571 PAIN IN JOINT INVOLVING ANKLE AND FOOT, RIGHT: Primary | ICD-10-CM

## 2022-08-03 PROCEDURE — 97110 THERAPEUTIC EXERCISES: CPT | Mod: 59

## 2022-08-03 PROCEDURE — 97530 THERAPEUTIC ACTIVITIES: CPT | Mod: GP

## 2022-08-03 PROCEDURE — 97140 MANUAL THERAPY 1/> REGIONS: CPT | Mod: 59

## 2022-08-22 ENCOUNTER — OFFICE VISIT (OUTPATIENT)
Dept: FAMILY MEDICINE | Facility: CLINIC | Age: 62
End: 2022-08-22

## 2022-08-22 VITALS
DIASTOLIC BLOOD PRESSURE: 68 MMHG | SYSTOLIC BLOOD PRESSURE: 118 MMHG | TEMPERATURE: 97.8 F | HEART RATE: 70 BPM | OXYGEN SATURATION: 98 % | BODY MASS INDEX: 41.78 KG/M2 | WEIGHT: 260 LBS | HEIGHT: 66 IN

## 2022-08-22 DIAGNOSIS — E66.01 MORBID OBESITY (H): ICD-10-CM

## 2022-08-22 DIAGNOSIS — E88.810 METABOLIC SYNDROME: ICD-10-CM

## 2022-08-22 DIAGNOSIS — Z01.818 PREOP GENERAL PHYSICAL EXAM: Primary | ICD-10-CM

## 2022-08-22 DIAGNOSIS — K64.8 INTERNAL HEMORRHOIDS: ICD-10-CM

## 2022-08-22 DIAGNOSIS — G43.009 MIGRAINE WITHOUT AURA AND WITHOUT STATUS MIGRAINOSUS, NOT INTRACTABLE: ICD-10-CM

## 2022-08-22 DIAGNOSIS — Z96.641 STATUS POST RIGHT HIP REPLACEMENT: ICD-10-CM

## 2022-08-22 DIAGNOSIS — Z87.19 HISTORY OF GI BLEED: ICD-10-CM

## 2022-08-22 DIAGNOSIS — E78.2 MIXED HYPERLIPIDEMIA: ICD-10-CM

## 2022-08-22 DIAGNOSIS — M25.571 PAIN IN JOINT INVOLVING ANKLE AND FOOT, RIGHT: ICD-10-CM

## 2022-08-22 DIAGNOSIS — M25.511 CHRONIC PAIN OF BOTH SHOULDERS: ICD-10-CM

## 2022-08-22 DIAGNOSIS — G89.29 CHRONIC PAIN OF BOTH SHOULDERS: ICD-10-CM

## 2022-08-22 DIAGNOSIS — R73.01 ELEVATED FASTING GLUCOSE: ICD-10-CM

## 2022-08-22 DIAGNOSIS — M25.512 CHRONIC PAIN OF BOTH SHOULDERS: ICD-10-CM

## 2022-08-22 DIAGNOSIS — M16.12 PRIMARY OSTEOARTHRITIS OF LEFT HIP: ICD-10-CM

## 2022-08-22 DIAGNOSIS — D12.0 BENIGN NEOPLASM OF CECUM: ICD-10-CM

## 2022-08-22 DIAGNOSIS — I44.7 LBBB (LEFT BUNDLE BRANCH BLOCK): ICD-10-CM

## 2022-08-22 DIAGNOSIS — G47.33 OSA (OBSTRUCTIVE SLEEP APNEA): ICD-10-CM

## 2022-08-22 DIAGNOSIS — M10.9 ACUTE GOUTY ARTHRITIS: ICD-10-CM

## 2022-08-22 LAB
ERYTHROCYTE [DISTWIDTH] IN BLOOD BY AUTOMATED COUNT: 12.2 %
HCT VFR BLD AUTO: 42.6 % (ref 35–47)
HEMOGLOBIN: 13.9 G/DL (ref 11.7–15.7)
MCH RBC QN AUTO: 30.5 PG (ref 26–33)
MCHC RBC AUTO-ENTMCNC: 32.6 G/DL (ref 31–36)
MCV RBC AUTO: 93.5 FL (ref 78–100)
PLATELET COUNT - QUEST: 225 10^9/L (ref 150–375)
RBC # BLD AUTO: 4.56 10*12/L (ref 3.8–5.2)
WBC # BLD AUTO: 5.8 10*9/L (ref 4–11)

## 2022-08-22 PROCEDURE — 99214 OFFICE O/P EST MOD 30 MIN: CPT | Mod: 25 | Performed by: PHYSICIAN ASSISTANT

## 2022-08-22 PROCEDURE — 85027 COMPLETE CBC AUTOMATED: CPT | Performed by: PHYSICIAN ASSISTANT

## 2022-08-22 PROCEDURE — 93000 ELECTROCARDIOGRAM COMPLETE: CPT | Performed by: PHYSICIAN ASSISTANT

## 2022-08-22 PROCEDURE — 36415 COLL VENOUS BLD VENIPUNCTURE: CPT | Performed by: PHYSICIAN ASSISTANT

## 2022-08-22 RX ORDER — FEBUXOSTAT 40 MG/1
40 TABLET, FILM COATED ORAL
COMMUNITY
Start: 2022-08-22

## 2022-08-22 RX ORDER — COLCHICINE 0.6 MG/1
0.6 TABLET ORAL DAILY
COMMUNITY
Start: 2022-08-22 | End: 2023-04-18

## 2022-08-22 NOTE — PROGRESS NOTES
[facility]  [address] [place] [address]  [address]  [facility] [address]  Phone: [phone]  Fax: [phone]  Primary Provider: Gabriel Borges  Pre-op Performing Provider: GABRIEL BORGES      PREOPERATIVE EVALUATION:  Today's date: 8/22/2022    Rupal Puri is a 62 year old female who presents for a preoperative evaluation.    Surgical Information:  Surgery/Procedure: left hip surgery  Surgery Location: [facility]  Surgeon: Dr. Joseph Rowland  Surgery Date: 9/6/2022  Time of Surgery: TBD  Where patient plans to recover: At home with family  Fax number for surgical facility:     Type of Anesthesia Anticipated: to be determined    Assessment & Plan     The proposed surgical procedure is considered INTERMEDIATE risk.    1. Preop general physical exam    2. Primary osteoarthritis of left hip    3. History of GI bleed 2014    4. Chronic pain of both shoulders    5. Benign neoplasm of cecum 2017 colonoscopy - repeat 2022    6. Migraine without aura and without status migrainosus, not intractable    7. Morbid obesity (H)    8. Internal hemorrhoids - per 2017 colonoscopy    9. Status post right hip replacement    10. Pain in joint involving ankle and foot, right    11. Acute gouty arthritis    12. Elevated fasting glucose    13. LBBB (left bundle branch block)    14. Mixed hyperlipidemia    15. Metabolic syndrome    16. PEDRITO (obstructive sleep apnea)            Risks and Recommendations:  The patient has the following additional risks and recommendations for perioperative complications:   - Morbid obesity (BMI >40)  Obstructive Sleep Apnea:   Has CPAP    Medication Instructions:  Patient is to take all scheduled medications on the day of surgery    RECOMMENDATION:  APPROVAL GIVEN to proceed with proposed procedure, without further diagnostic evaluation.    Subjective     HPI related to upcoming procedure: Chronic pain left hip      1. No - Have you ever had a heart attack or  stroke?   2. No - Have you ever had surgery on your heart or blood vessels, such as a stent, coronary (heart) bypass, or surgery on an artery in the head, neck, heart, or legs?   3. No - Do you have chest pain when you are physically active?   4. No - Do you have a history of heart failure?   5. No - Do you currently have a cold, bronchitis, or symptoms of other respiratory (head and chest) infections?   6. No - Do you have a cough, shortness of breath, or wheezing?   7. YES - Do you or anyone in your family have a history of blood clots?  Father after fall  8. No - Do you or anyone in your family have a serious bleeding problem, such as long-lasting bleeding after surgeries or cuts?  Brother with factor V - pt tested, negative  9. No - Have you ever had anemia or been told to take iron pills?   10. No - Have you had any abnormal blood loss such as black, tarry or bloody stools, or abnormal vaginal bleeding?   11. No - Have you ever had a blood transfusion?   12. Yes - Are you willing to have a blood transfusion if it is medically needed before, during, or after your surgery?   13. No - Have you or anyone in your family ever had problems with anesthesia (sedation for surgery)?   14. YES - Do you have sleep apnea, excessive snoring, or daytime drowsiness?   15.NO - Do you have any artifical heart valves or other implanted medical devices, such as a pacemaker, defibrillator, or continuous glucose monitor?   16. YES - Do you have any artifical joints?   17. No - Are you allergic to latex?   18. No - Is there any chance that you may be pregnant?       No flowsheet data found.    Health Care Directive:  Patient does not have a Health Care Directive or Living Will: None on file    Preoperative Review of :   reviewed - controlled substances reflected in medication list.      Status of Chronic Conditions:  See problem list for active medical problems.  Problems all longstanding and stable, except as noted/documented.   See ROS for pertinent symptoms related to these conditions.      Review of Systems  CONSTITUTIONAL: NEGATIVE for fever, chills, change in weight  INTEGUMENTARY/SKIN: NEGATIVE for worrisome rashes, moles or lesions  EYES: NEGATIVE for vision changes or irritation  ENT/MOUTH: NEGATIVE for ear, mouth and throat problems  RESP: NEGATIVE for significant cough or SOB  CV: NEGATIVE for chest pain, palpitations or peripheral edema  GI: NEGATIVE for nausea, abdominal pain, heartburn, or change in bowel habits  : NEGATIVE for frequency, dysuria, or hematuria  MUSCULOSKELETAL: NEGATIVE for significant arthralgias or myalgia  NEURO: NEGATIVE for weakness, dizziness or paresthesias  ENDOCRINE: NEGATIVE for temperature intolerance, skin/hair changes  HEME: NEGATIVE for bleeding problems  PSYCHIATRIC: NEGATIVE for changes in mood or affect    Patient Active Problem List    Diagnosis Date Noted     PEDRITO (obstructive sleep apnea) 08/22/2022     Priority: Medium     S/P repair of ligament of ankle 06/23/2022     Priority: Medium     LBBB (left bundle branch block) 03/22/2022     Priority: Medium     Stable - echo  Cardiology work up completed - no Follow up needed if continues to be asymptomatic 3/22       Cardiac arrhythmia, unspecified cardiac arrhythmia type 12/17/2021     Priority: Medium     Undiagnosed cardiac murmurs 12/17/2021     Priority: Medium     Elevated fasting glucose 12/17/2021     Priority: Medium     Acute gouty arthritis 11/11/2020     Priority: Medium     Pain in joint involving ankle and foot, right 11/21/2019     Priority: Medium     Status post right hip replacement 10/15/2019     Priority: Medium     Morbid obesity (H) 09/07/2018     Priority: Medium     Migraine without aura and without status migrainosus, not intractable 01/05/2018     Priority: Medium     Internal hemorrhoids - per 2017 colonoscopy 03/06/2017     Priority: Medium     Benign neoplasm of cecum 2017 colonoscopy - repeat 2022 03/06/2017      Priority: Medium     Shoulder pain 07/08/2016     Priority: Medium     History of GI bleed 2014 07/22/2014     Priority: Medium     Post ortho surgery while on Lovenox - EGD completed       Health Care Home 03/13/2013     Priority: Medium     State Tier Level:  Tier 2  Status:  NA  Care Coordinator:     See Letters for [facility] Care Plan           Mixed hyperlipidemia 12/22/2010     Priority: Medium     Metabolic syndrome 12/22/2010     Priority: Medium     Obesity, high TG's and low HDL, fasting glucose borderline       ACP (advance care planning) 01/14/2016     Priority: Low      Past Medical History:   Diagnosis Date     Contact dermatitis and other eczema, due to unspecified cause      Gastric ulcer 2014     LBBB (left bundle branch block)      Migraine, unspecified, without mention of intractable migraine without mention of status migrainosus      Mild intermittent asthma      Obese      PEDRITO (obstructive sleep apnea) 8/22/2022     Plantar fascial fibromatosis      Past Surgical History:   Procedure Laterality Date     ARTHROPLASTY HIP ANTERIOR Right 10/15/2019    Procedure: RIGHT TOTAL HIP ARTHROPLASTY DIRECT ANTERIOR APPROACH  ;  Surgeon: Simon Pitts MD;  Location:  OR     ARTHROPLASTY KNEE  04/23/2013    Procedure: ARTHROPLASTY KNEE;  RIGHT TOTAL KNEE ARTHROPLASTY (IFEOMA)^;  Surgeon: Simon Pitts MD;  Location:  OR     ARTHROPLASTY KNEE  07/17/2014    Procedure: ARTHROPLASTY KNEE;  Surgeon: Simon Pitts MD;  Location:  OR     AS REPAIR FEMORAL HERNIA,REDUCIBLE  1991    hernia repair femoral     ESOPHAGOSCOPY, GASTROSCOPY, DUODENOSCOPY (EGD), COMBINED  07/22/2014    Procedure: COMBINED ESOPHAGOSCOPY, GASTROSCOPY, DUODENOSCOPY (EGD), BIOPSY SINGLE OR MULTIPLE;  Surgeon: Natalie Gibbons MD;  Location:  GI     FOOT SURGERY Right 2021     OSTEOTOMY FOOT Left 05/13/2019    Procedure: LEFT SECOND METATARSALPHALANGEAL CAPSULE REPAIR ; SECOND AND THIRD METATARSAL  OSTEOTOMY;  Surgeon: Luis Armando  Christina VARELA MD;  Location: [facility] OR     PLACEMENT OF DENTAL IMPLANT(S)       TONSILLECTOMY & ADENOIDECTOMY  1966    age 6     Current Outpatient Medications   Medication Sig Dispense Refill     colchicine (COLCYRS) 0.6 MG tablet Take 1 tablet (0.6 mg) by mouth daily       febuxostat (ULORIC) 40 MG TABS tablet Take 1 tablet (40 mg) by mouth       diazepam (VALIUM) 2 MG tablet Take one up to twice daily for anxiety 10 tablet 0     magnesium 250 MG tablet Take 1 tablet by mouth daily       UNABLE TO FIND MEDICATION NAME: Tart Cherry with celery seed         Allergies   Allergen Reactions     Celebrex [Celecoxib] Nausea and Vomiting     Allopurinol Hives     Aspirin Diarrhea, Fatigue, GI Disturbance, Nausea and Nausea and Vomiting     Ibuprofen GI Disturbance     2 days of gi upset after taking any NSAID     Indomethacin         Social History     Tobacco Use     Smoking status: Former Smoker     Packs/day: 1.00     Years: 5.00     Pack years: 5.00     Quit date: 3/20/1988     Years since quittin.4     Smokeless tobacco: Never Used   Substance Use Topics     Alcohol use: Yes     Alcohol/week: 2.0 standard drinks     Types: 2 Standard drinks or equivalent per week     Comment: 1 drink - a month     Family History   Problem Relation Age of Onset     Osteoporosis Mother      Ovarian Cancer Mother 35     Hypertension Mother      Leukemia Mother      Cancer Father         kidney     C.A.D. Father         early disease     Gout Father      Macular Degeneration Brother      Factor V Leiden deficiency Brother      Polycythemia Brother      Lipids Brother      Neurologic Disorder Maternal Grandmother         TIA's     Diabetes Maternal Grandmother      Depression Daughter      Anxiety Disorder Daughter      Breast Cancer Maternal Aunt      Breast Cancer Paternal Aunt      Cancer - colorectal No family hx of      Anesthesia Reaction No family hx of      Crohn's Disease No family hx of      Ulcerative Colitis No family hx of  "     Colon Polyps No family hx of      History   Drug Use No         Objective     /68 (BP Location: Right arm, Patient Position: Sitting, Cuff Size: Adult Large)   Pulse 70   Temp 97.8  F (36.6  C) (Temporal)   Ht 1.676 m (5' 6\")   Wt 117.9 kg (260 lb)   LMP 03/10/2013   SpO2 98%   BMI 41.97 kg/m      Physical Exam    GENERAL APPEARANCE: healthy, alert and no distress     EYES: EOMI, PERRL     HENT: ear canals and TM's normal and nose and mouth without ulcers or lesions     NECK: no adenopathy, no asymmetry, masses, or scars and thyroid normal to palpation     RESP: lungs clear to auscultation - no rales, rhonchi or wheezes     CV: regular rates and rhythm, normal S1 S2, no S3 or S4 and no murmur, click or rub     ABDOMEN:  soft, nontender, no HSM or masses and bowel sounds normal     MS: extremities normal- no gross deformities noted, no evidence of inflammation in joints, FROM in all extremities.     SKIN: no suspicious lesions or rashes     NEURO: Normal strength and tone, sensory exam grossly normal, mentation intact and speech normal     PSYCH: mentation appears normal. and affect normal/bright     LYMPHATICS: No cervical adenopathy    Recent Labs   Lab Test 05/09/22  0000 12/17/21  1152 12/17/21  0000   HGB 13.2 13.4  13.2  --     225  223  --    .7  --   --    POTASSIUM 4.77  --   --    CR 0.92  --   --    A1C  --   --  5.9        Diagnostics:    Component      Latest Ref Rng & Units 5/9/2022 8/22/2022   WBC      4.0 - 11 10*9/L  5.8   RBC Count      3.8 - 5.2 10*12/L  4.56   Hemoglobin      11.7 - 15.7 g/dL  13.9   Hematocrit      35.0 - 47.0 %  42.6   MCV      78 - 100 fL  93.5   MCH      26 - 33 pg  30.5   MCHC      31 - 36 g/dL  32.6   RDW      %  12.2   Platelet Count      150 - 375 10:9/L  225   Creatinine      0.60 - 1.30 mg/dL 0.92    Glucose      60 - 99 mg/dL 75    Sodium      135 - 146 mmol/L 140.7    Potassium      3.5 - 5.3 mmol/L 4.77    Chloride      98 - 110 mmol/L " 108.5    Urea Nitrogen      7 - 25 mg/dL 16    Calcium      8.6 - 10.3 mg/dL 8.9    BUN/Creatinine Ratio      6 - 22 17.4      EKG: appears normal, NSR, normal axis, normal intervals, no acute ST/T changes c/w ischemia, Left Bundle Branch Block, Echo completed this year, had cardiac consult in March this year -  no further cardiac testing preoperatively advised    Revised Cardiac Risk Index (RCRI):  The patient has the following serious cardiovascular risks for perioperative complications:   - No serious cardiac risks = 0 points     RCRI Interpretation: 0 points: Class I (very low risk - 0.4% complication rate)           Signed Electronically by: PAUL Smart  Copy of this evaluation report is provided to requesting physician.

## 2022-09-19 ENCOUNTER — OFFICE VISIT (OUTPATIENT)
Dept: FAMILY MEDICINE | Facility: CLINIC | Age: 62
End: 2022-09-19

## 2022-09-19 ENCOUNTER — THERAPY VISIT (OUTPATIENT)
Dept: PHYSICAL THERAPY | Facility: CLINIC | Age: 62
End: 2022-09-19
Payer: COMMERCIAL

## 2022-09-19 VITALS
OXYGEN SATURATION: 97 % | HEART RATE: 73 BPM | WEIGHT: 261 LBS | SYSTOLIC BLOOD PRESSURE: 136 MMHG | TEMPERATURE: 98 F | BODY MASS INDEX: 42.13 KG/M2 | DIASTOLIC BLOOD PRESSURE: 72 MMHG | RESPIRATION RATE: 20 BRPM

## 2022-09-19 DIAGNOSIS — M25.552 HIP PAIN, LEFT: ICD-10-CM

## 2022-09-19 DIAGNOSIS — G89.29 CHRONIC LEFT SHOULDER PAIN: Primary | ICD-10-CM

## 2022-09-19 DIAGNOSIS — M25.512 CHRONIC LEFT SHOULDER PAIN: Primary | ICD-10-CM

## 2022-09-19 DIAGNOSIS — Z47.1 AFTERCARE FOLLOWING LEFT HIP JOINT REPLACEMENT SURGERY: ICD-10-CM

## 2022-09-19 DIAGNOSIS — Z96.642 AFTERCARE FOLLOWING LEFT HIP JOINT REPLACEMENT SURGERY: ICD-10-CM

## 2022-09-19 PROCEDURE — 97110 THERAPEUTIC EXERCISES: CPT | Mod: GP | Performed by: PHYSICAL THERAPIST

## 2022-09-19 PROCEDURE — 99214 OFFICE O/P EST MOD 30 MIN: CPT | Performed by: STUDENT IN AN ORGANIZED HEALTH CARE EDUCATION/TRAINING PROGRAM

## 2022-09-19 PROCEDURE — 97161 PT EVAL LOW COMPLEX 20 MIN: CPT | Mod: GP | Performed by: PHYSICAL THERAPIST

## 2022-09-19 PROCEDURE — 73030 X-RAY EXAM OF SHOULDER: CPT | Mod: LT | Performed by: STUDENT IN AN ORGANIZED HEALTH CARE EDUCATION/TRAINING PROGRAM

## 2022-09-19 RX ORDER — PSEUDOEPHED/ACETAMINOPH/DIPHEN 30MG-500MG
TABLET ORAL
COMMUNITY
Start: 2022-09-07 | End: 2022-12-20

## 2022-09-19 NOTE — PROGRESS NOTES
[facility] Initial Evaluation     Present: no    Subjective:  Rupal Puri is a 62 year old female with complaints of left hip . Presents to PT s/p L EMILIANO on 9/6/22.  Pt reports that she is still in pain since the recent surgery and is using pain meds as needed. Worse with sleeping, walking, standing, and certain positions. Denies other vague symptoms.  Also having some possible back/sciaitca symptoms behind the left leg which she attributes to crutches. Hx of R EMILIANO in 2019. Also going to work with a sports massage therapist in the near future. Wants to walk pain free, build up overall activity level.      Symptoms commenced as a result of: surgery. Condition occurred in the following environment: surgery. Onset of symptoms: 9/6/22(date of surgery). Location of symptoms: left hip. Pain level on number scale: 6/10. Quality of pain: aching, sharp. Associated symptoms: none. Pain frequency (constant/intermittent): constant. Symptoms are exacerbated by: sleeping, standing, walking. Symptoms are relieved by: certain positions, nothing clearly. Progression of symptoms since onset: improving. Imaging: yes. Previous treatment: yes, surgery. Response to previous treatment: yes. General health as reported by patient is good. Pertinent medical history includes: See Epic. Medical allergies: see Epic. Other pertinent surgeries: see Epic. Current medications: See Epic. Occupation: office work. Work/restriction status: none. Primary job tasks: sitting, computer. Barriers at home/work: None reported by patient. Red flags: None reported by patient.    Objective  Gait:mild limp with cane on the right    Screening: tolerates lumbar extension well with repeated in standing without leg symptoms     Flexibility: limited assemsent      Hip PROM (* = pain) Right Left   FL 90 90   EXT NT NT   ER 45 35   IR 0 0     Palpation tenderness: unremarkable    Function: poor partial depth squat, poor SLS left and  fair on the right    Key Findings  Aftercare s/p L EMILIANO 2 weeks ago, L hip flexor irritation and L lumbar symptoms also going on. History of abhilash TKA with trouble with certain positions there.   Assessment/Plan:    Patient is a 62 year old female with left side hip complaints.    Patient has the following significant findings with corresponding treatment plan.                Diagnosis 1:  Left hip pain, aftercare s/p L EMILIANO  Pain -  manual therapy, self management, education and home program  Decreased ROM/flexibility - manual therapy and therapeutic exercise  Decreased joint mobility - manual therapy and therapeutic exercise  Decreased strength - therapeutic exercise and therapeutic activities  Impaired balance - neuro re-education and therapeutic activities  Impaired gait - gait training  Impaired muscle performance - neuro re-education  Decreased function - therapeutic activities    Therapy Evaluation Codes:     1) History comprised of:   Personal factors that impact the plan of care:      Time since onset of symptoms.    Comorbidity factors that impact the plan of care are:      None.     Medications impacting care: Pain.  2) Examination of Body Systems comprised of:   Body structures and functions that impact the plan of care:      Hip.   Activity limitations that impact the plan of care are:      Bending, Cooking, Driving, Dressing, Lifting, Sitting, Sports, Squatting/kneeling, Stairs, Standing, Walking, Working, Sleeping and Laying down.  3) Clinical presentation characteristics are:   Stable/Uncomplicated.  4) Decision-Making    Low complexity using standardized patient assessment instrument and/or measureable assessment of functional outcome.    Cumulative Therapy Evaluation is: Low complexity.    Previous and current functional limitations:  (See Goal Flow Sheet for this information)    Short term and Long term goals: (See Goal Flow Sheet for this information)     Communication ability:  Patient appears to be  able to clearly communicate and understand verbal and written communication and follow directions correctly.  Treatment Explanation - The following has been discussed with the patient:   RX ordered/plan of care  Anticipated outcomes  Possible risks and side effects  This patient would benefit from PT intervention to resume normal activities.   Rehab potential is good.    Frequency:  1 X week, once daily  Duration:  for 8 weeks  Discharge Plan:  Achieve all LTG.  Independent in home treatment program.  Reach maximal therapeutic benefit.    Please refer to the daily flowsheet for treatment today, total treatment time and time spent performing 1:1 timed codes.     Inquires  Zafar Becker PT, DPT, CSCS  Physical Therapist  [facility]  [id number] [place] [address], [facility] [address] [place] [address] [phone]

## 2022-09-19 NOTE — NURSING NOTE
Chief Complaint   Patient presents with     Shoulder Pain     Left shoulder pain that has been around for awhile and now getting progressively worse, feels that the pain is starting to radiate down a little      Pre-visit Screening:  Immunizations:  up to date  Colonoscopy:  is up to date  Mammogram: is up to date  Asthma Action Test/Plan:  NA  PHQ9:  NA  GAD7:  NA  Questioned patient about current smoking habits Pt. quit smoking some time ago.  Ok to leave detailed message on voice mail for today's visit only Yes, phone # [phone]

## 2022-09-19 NOTE — PROGRESS NOTES
ASSESSMENT & PLAN      ICD-10-CM    1. Chronic left shoulder pain  M25.512 XR Shoulder Left G/E 3 Views    G89.29 Physical Therapy Referral         XRs obtained and reviewed with patient. Discussed MRI to further eval vs continued home mgmt and PT. Patient elects PT. Could also consider steroid injection for sx relief if worsening or limiting therapy. Pt in agreement, follow-up prn.       David Hinojosa MD, CAQSM  [facility]      -----    SUBJECTIVE  Rupal Puri is a/an 62 year old female who is seen for evaluation of     Chief Complaint   Patient presents with     Shoulder Pain     Left shoulder pain that has been around for awhile and now getting progressively worse, feels that the pain is starting to radiate down a little        The patient is seen by themselves.  The patient is Right handed    Date of Onset: ~2 yrs  Mechanism of injury: Reports insidious onset without acute precipitating event.  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 Hx of bilat TKA and EMILIANO.     Patient's PMH, PSH, and family hx reviewed.      OBJECTIVE:  /72 (BP Location: Left arm, Patient Position: Sitting, Cuff Size: Adult Large)   Pulse 73   Temp 98  F (36.7  C) (Temporal)   Resp 20   Wt 118.4 kg (261 lb)   LMP 03/10/2013   SpO2 97%   BMI 42.13 kg/m     Alert, NAD  NC/AT  Sclerae anicteric  Regular  Resp nonlabored  Skin warm and dry  No focal neuro deficits. Speech intact.   Appropriate affect    R shoulder without acute deformity  Symm AROM 150/50/T8  5/5 str abd, flex, ER, IR  Neg empty can  Neg speeds  CMS intact distally    RADIOLOGY:  XR Shoulder Left G/E 3 Views   EXAM: XRAY SHOULDER,2+VWS LEFT LOCATION: [facility]  Physicians DATE/TIME: 9/19/2022 11:48 AM INDICATION: Left shoulder pain COMPARISON: None. IMPRESSION: Mild arthritic changes of the AC joint. No definitive evidence of a distal clavicular fracture. Glenohumeral joint space is maintained. No dislocation. SIGNED BY: Dex Vega MD 9/19/2022 9:37 PM

## 2022-09-19 NOTE — LETTER
ROSE MARIE [facility]  [address] [address]  [address]  [facility] [address]  [phone]    2022    Re: Rupal Puri   :   1960  MRN:  [id number]   REFERRING PHYSICIAN:   Joseph TROTTER [facility]    Date of Initial Evaluation: 2022  Visits:  Rxs Used: 1  Reason for Referral:     Hip pain, left  Aftercare following left hip joint replacement surgery    EVALUATION SUMMARY    [facility] Initial Evaluation     Present: no    Subjective:  Rupal Puri is a 62 year old female with complaints of left hip . Presents to PT s/p L EMILIANO on 22.  Pt reports that she is still in pain since the recent surgery and is using pain meds as needed. Worse with sleeping, walking, standing, and certain positions. Denies other vague symptoms.  Also having some possible back/sciaitca symptoms behind the left leg which she attributes to crutches. Hx of R EMILIANO in 2019. Also going to work with a sports massage therapist in the near future. Wants to walk pain free, build up overall activity level.      Symptoms commenced as a result of: surgery. Condition occurred in the following environment: surgery. Onset of symptoms: 22(date of surgery). Location of symptoms: left hip. Pain level on number scale: 6/10. Quality of pain: aching, sharp. Associated symptoms: none. Pain frequency (constant/intermittent): constant. Symptoms are exacerbated by: sleeping, standing, walking. Symptoms are relieved by: certain positions, nothing clearly. Progression of symptoms since onset: improving. Imaging: yes. Previous treatment: yes, surgery. Response to previous treatment: yes. General health as reported by patient is good. Pertinent medical history includes: See Epic. Medical allergies: see Epic. Other pertinent surgeries: see Epic. Current medications: See Epic.  Occupation: office work. Work/restriction status: none. Primary job tasks: sitting, computer. Barriers at home/work: None reported by patient. Red flags: None reported by patient.    Objective  Gait:mild limp with cane on the right    Screening: tolerates lumbar extension well with repeated in standing without leg symptoms     Flexibility: limited assemsent          Re: Rupal Puri   :   1960  MRN:  [id number]     Hip PROM (* = pain) Right Left   FL 90 90   EXT NT NT   ER 45 35   IR 0 0     Palpation tenderness: unremarkable    Function: poor partial depth squat, poor SLS left and fair on the right    Key Findings  Aftercare s/p L EMILIANO 2 weeks ago, L hip flexor irritation and L lumbar symptoms also going on. History of abhilash TKA with trouble with certain positions there.   Assessment/Plan:    Patient is a 62 year old female with left side hip complaints.    Patient has the following significant findings with corresponding treatment plan.                Diagnosis 1:  Left hip pain, aftercare s/p L EMILIANO  Pain -  manual therapy, self management, education and home program  Decreased ROM/flexibility - manual therapy and therapeutic exercise  Decreased joint mobility - manual therapy and therapeutic exercise  Decreased strength - therapeutic exercise and therapeutic activities  Impaired balance - neuro re-education and therapeutic activities  Impaired gait - gait training  Impaired muscle performance - neuro re-education  Decreased function - therapeutic activities    Therapy Evaluation Codes:     1) History comprised of:   Personal factors that impact the plan of care:      Time since onset of symptoms.    Comorbidity factors that impact the plan of care are:      None.     Medications impacting care: Pain.  2) Examination of Body Systems comprised of:   Body structures and functions that impact the plan of care:      Hip.   Activity limitations that impact the plan of care are:      Bending, Cooking, Driving,  Dressing, Lifting, Sitting, Sports, Squatting/kneeling, Stairs, Standing, Walking, Working, Sleeping and Laying down.  3) Clinical presentation characteristics are:   Stable/Uncomplicated.  4) Decision-Making    Low complexity using standardized patient assessment instrument and/or measureable assessment of functional outcome.    Cumulative Therapy Evaluation is: Low complexity.    Previous and current functional limitations:  (See Goal Flow Sheet for this information)    Short term and Long term goals: (See Goal Flow Sheet for this information)     Communication ability:  Patient appears to be able to clearly communicate and understand verbal and written communication and follow directions correctly.    Re: Rupal Puri   :   1960  MRN:  [id number]     Treatment Explanation - The following has been discussed with the patient:   RX ordered/plan of care  Anticipated outcomes  Possible risks and side effects  This patient would benefit from PT intervention to resume normal activities.   Rehab potential is good.    Frequency:  1 X week, once daily  Duration:  for 8 weeks  Discharge Plan:  Achieve all LTG.  Independent in home treatment program.  Reach maximal therapeutic benefit.    Please refer to the daily flowsheet for treatment today, total treatment time and time spent performing 1:1 timed codes.     Inquires  Zafar Becker PT, DPT, CSCS  Physical Therapist  [facility]  [address] [place] [address], [place] [id number] [phone]    Thank you for your referral.    INQUIRIES  Therapist: Zafar Becker PT  [facility] CARE [address]  [address] [place] [id number]  Phone: [phone]  Fax: [phone]

## 2022-09-21 ENCOUNTER — ALLIED HEALTH/NURSE VISIT (OUTPATIENT)
Dept: FAMILY MEDICINE | Facility: CLINIC | Age: 62
End: 2022-09-21

## 2022-09-21 DIAGNOSIS — Z23 NEED FOR VACCINATION: Primary | ICD-10-CM

## 2022-09-21 PROCEDURE — 90471 IMMUNIZATION ADMIN: CPT | Performed by: STUDENT IN AN ORGANIZED HEALTH CARE EDUCATION/TRAINING PROGRAM

## 2022-09-21 PROCEDURE — 90686 IIV4 VACC NO PRSV 0.5 ML IM: CPT | Performed by: STUDENT IN AN ORGANIZED HEALTH CARE EDUCATION/TRAINING PROGRAM

## 2022-09-23 ENCOUNTER — THERAPY VISIT (OUTPATIENT)
Dept: PHYSICAL THERAPY | Facility: CLINIC | Age: 62
End: 2022-09-23
Payer: COMMERCIAL

## 2022-09-23 DIAGNOSIS — Z96.642 AFTERCARE FOLLOWING LEFT HIP JOINT REPLACEMENT SURGERY: ICD-10-CM

## 2022-09-23 DIAGNOSIS — Z47.1 AFTERCARE FOLLOWING LEFT HIP JOINT REPLACEMENT SURGERY: ICD-10-CM

## 2022-09-23 DIAGNOSIS — M25.552 HIP PAIN, LEFT: Primary | ICD-10-CM

## 2022-09-23 PROCEDURE — 97110 THERAPEUTIC EXERCISES: CPT | Mod: GP | Performed by: PHYSICAL THERAPIST

## 2022-09-23 PROCEDURE — 97530 THERAPEUTIC ACTIVITIES: CPT | Mod: GP | Performed by: PHYSICAL THERAPIST

## 2022-09-27 ENCOUNTER — TELEPHONE (OUTPATIENT)
Dept: FAMILY MEDICINE | Facility: CLINIC | Age: 62
End: 2022-09-27

## 2022-09-29 ENCOUNTER — THERAPY VISIT (OUTPATIENT)
Dept: PHYSICAL THERAPY | Facility: CLINIC | Age: 62
End: 2022-09-29
Payer: COMMERCIAL

## 2022-09-29 DIAGNOSIS — Z47.1 AFTERCARE FOLLOWING LEFT HIP JOINT REPLACEMENT SURGERY: ICD-10-CM

## 2022-09-29 DIAGNOSIS — Z96.642 AFTERCARE FOLLOWING LEFT HIP JOINT REPLACEMENT SURGERY: ICD-10-CM

## 2022-09-29 DIAGNOSIS — M25.552 HIP PAIN, LEFT: Primary | ICD-10-CM

## 2022-09-29 PROCEDURE — 97110 THERAPEUTIC EXERCISES: CPT | Mod: GP | Performed by: PHYSICAL THERAPIST

## 2022-09-29 PROCEDURE — 97112 NEUROMUSCULAR REEDUCATION: CPT | Mod: GP | Performed by: PHYSICAL THERAPIST

## 2022-10-06 ENCOUNTER — THERAPY VISIT (OUTPATIENT)
Dept: PHYSICAL THERAPY | Facility: CLINIC | Age: 62
End: 2022-10-06
Payer: COMMERCIAL

## 2022-10-06 DIAGNOSIS — Z47.1 AFTERCARE FOLLOWING LEFT HIP JOINT REPLACEMENT SURGERY: ICD-10-CM

## 2022-10-06 DIAGNOSIS — M25.552 HIP PAIN, LEFT: Primary | ICD-10-CM

## 2022-10-06 DIAGNOSIS — Z96.642 AFTERCARE FOLLOWING LEFT HIP JOINT REPLACEMENT SURGERY: ICD-10-CM

## 2022-10-06 PROCEDURE — 97530 THERAPEUTIC ACTIVITIES: CPT | Mod: GP | Performed by: PHYSICAL THERAPIST

## 2022-10-06 PROCEDURE — 97110 THERAPEUTIC EXERCISES: CPT | Mod: GP | Performed by: PHYSICAL THERAPIST

## 2022-10-13 ENCOUNTER — THERAPY VISIT (OUTPATIENT)
Dept: PHYSICAL THERAPY | Facility: CLINIC | Age: 62
End: 2022-10-13
Payer: COMMERCIAL

## 2022-10-13 DIAGNOSIS — Z96.642 AFTERCARE FOLLOWING LEFT HIP JOINT REPLACEMENT SURGERY: ICD-10-CM

## 2022-10-13 DIAGNOSIS — Z47.1 AFTERCARE FOLLOWING LEFT HIP JOINT REPLACEMENT SURGERY: ICD-10-CM

## 2022-10-13 DIAGNOSIS — M25.552 HIP PAIN, LEFT: Primary | ICD-10-CM

## 2022-10-13 PROCEDURE — 97530 THERAPEUTIC ACTIVITIES: CPT | Mod: GP | Performed by: PHYSICAL THERAPIST

## 2022-10-13 PROCEDURE — 97110 THERAPEUTIC EXERCISES: CPT | Mod: GP | Performed by: PHYSICAL THERAPIST

## 2022-10-13 NOTE — PROGRESS NOTES
PROGRESS  REPORT    Progress reporting period is from IE to 10/13/22.       SUBJECTIVE  Subjective: The patient is nearly 6 weeks post op L EMILIANO that took place on 9/6/22. She is now back to work full time. Walking with a SPC and this goes moderately well. Does stometimes walk without the cane for short distances, but still feels that she needs the cane.  Painful in bilateral knees, low back.    Current Pain level: 3/10.      Initial Pain level: 6/10.   Changes in function:  Yes (See Goal flowsheet attached for changes in current functional level)  Adverse reaction to treatment or activity: None    OBJECTIVE  Changes noted in objective findings:  Yes  Objective: Tender to R LB and lateral hip. Tolerates exericses well. Improved gait quality with SPC.     ASSESSMENT/PLAN  Updated problem list and treatment plan: Diagnosis 1:  S/P L EMILIANO Pain -  hot/cold therapy, electric stimulation, manual therapy and home program  Decreased ROM/flexibility - manual therapy and therapeutic exercise  Decreased strength - therapeutic exercise and therapeutic activities  Inflammation - cold therapy, US and electric stimulation  Impaired gait - gait training  STG/LTGs have been met or progress has been made towards goals:  Yes (See Goal flow sheet completed today.)  Assessment of Progress: The patient's condition is improving.  Self Management Plans:  Patient has been instructed in a home treatment program.  I have re-evaluated this patient and find that the nature, scope, duration and intensity of the therapy is appropriate for the medical condition of the patient.  Rupal continues to require the following intervention to meet STG and LTG's:  PT    Recommendations:  This patient would benefit from continued therapy.     Frequency:  1 X week, once daily  Duration:  for 4 weeks      Please refer to the daily flowsheet for treatment today, total treatment time and time spent performing 1:1 timed codes.

## 2022-10-19 ENCOUNTER — THERAPY VISIT (OUTPATIENT)
Dept: PHYSICAL THERAPY | Facility: CLINIC | Age: 62
End: 2022-10-19
Payer: COMMERCIAL

## 2022-10-19 DIAGNOSIS — Z98.890 S/P REPAIR OF LIGAMENT OF ANKLE: ICD-10-CM

## 2022-10-19 DIAGNOSIS — M25.571 PAIN IN JOINT INVOLVING ANKLE AND FOOT, RIGHT: Primary | ICD-10-CM

## 2022-10-19 PROCEDURE — 97110 THERAPEUTIC EXERCISES: CPT | Mod: GP | Performed by: PHYSICAL THERAPIST

## 2022-10-20 ENCOUNTER — THERAPY VISIT (OUTPATIENT)
Dept: PHYSICAL THERAPY | Facility: CLINIC | Age: 62
End: 2022-10-20
Payer: COMMERCIAL

## 2022-10-20 DIAGNOSIS — M25.552 HIP PAIN, LEFT: Primary | ICD-10-CM

## 2022-10-20 DIAGNOSIS — Z96.642 AFTERCARE FOLLOWING LEFT HIP JOINT REPLACEMENT SURGERY: ICD-10-CM

## 2022-10-20 DIAGNOSIS — Z47.1 AFTERCARE FOLLOWING LEFT HIP JOINT REPLACEMENT SURGERY: ICD-10-CM

## 2022-10-20 PROCEDURE — 97110 THERAPEUTIC EXERCISES: CPT | Mod: GP | Performed by: PHYSICAL THERAPIST

## 2022-11-16 ENCOUNTER — THERAPY VISIT (OUTPATIENT)
Dept: PHYSICAL THERAPY | Facility: CLINIC | Age: 62
End: 2022-11-16
Payer: COMMERCIAL

## 2022-11-16 DIAGNOSIS — Z47.1 AFTERCARE FOLLOWING LEFT HIP JOINT REPLACEMENT SURGERY: ICD-10-CM

## 2022-11-16 DIAGNOSIS — M25.552 HIP PAIN, LEFT: Primary | ICD-10-CM

## 2022-11-16 DIAGNOSIS — Z96.642 AFTERCARE FOLLOWING LEFT HIP JOINT REPLACEMENT SURGERY: ICD-10-CM

## 2022-11-16 PROCEDURE — 97110 THERAPEUTIC EXERCISES: CPT | Mod: GP | Performed by: PHYSICAL THERAPIST

## 2022-11-16 NOTE — PROGRESS NOTES
DISCHARGE REPORT    Progress reporting period is from IE to 11/16/22      SUBJECTIVE  Subjective: The patient returns today from her recent trip and reports that she did well walking on her trip. Feels that her hip is 100%. She does continue to experience discomfort in her ankles and knee. She feels comfortable continuing with home exercises and discharging from PT at this time.     Current Pain level: 1/10.      Initial Pain level: 6/10.   Changes in function:  Yes (See Goal flowsheet attached for changes in current functional level)  Adverse reaction to treatment or activity: None    OBJECTIVE  Changes noted in objective findings:  Yes  Objective: Full rev of HEP. Gait abnormalities noted due to weakness and continued ankle and knee deficits. Gluteal strength 4/5, quad 4/5, hip AROM, min limited IR/ER, abd, flexion WNL, etension mod limited     ASSESSMENT/PLAN  Updated problem list and treatment plan: Diagnosis 1:  S/P Hip replacement   Pain -  home program  Decreased ROM/flexibility - home program  Decreased strength - home program  Impaired gait - home program  STG/LTGs have been met or progress has been made towards goals:  Yes (See Goal flow sheet completed today.)  Assessment of Progress: The patient has met all of their long term goals.  Self Management Plans:  Patient has been instructed in a home treatment program.  I have re-evaluated this patient and find that the nature, scope, duration and intensity of the therapy is appropriate for the medical condition of the patient.  Rupal continues to require the following intervention to meet STG and LTG's:  PT intervention is no longer required to meet STG/LTG.    Recommendations:  This patient is ready to be discharged from therapy and continue their home treatment program.    Please refer to the daily flowsheet for treatment today, total treatment time and time spent performing 1:1 timed codes.

## 2022-11-17 ENCOUNTER — LAB REQUISITION (OUTPATIENT)
Dept: LAB | Facility: CLINIC | Age: 62
End: 2022-11-17

## 2022-11-17 DIAGNOSIS — Z01.419 ENCOUNTER FOR GYNECOLOGICAL EXAMINATION (GENERAL) (ROUTINE) WITHOUT ABNORMAL FINDINGS: ICD-10-CM

## 2022-11-17 PROCEDURE — G0145 SCR C/V CYTO,THINLAYER,RESCR: HCPCS | Performed by: OBSTETRICS & GYNECOLOGY

## 2022-11-21 LAB
BKR LAB AP GYN ADEQUACY: NORMAL
BKR LAB AP GYN INTERPRETATION: NORMAL
BKR LAB AP HPV REFLEX: NORMAL
BKR LAB AP LMP: NORMAL
BKR LAB AP PREVIOUS ABNL DX: NORMAL
BKR LAB AP PREVIOUS ABNORMAL: NORMAL
PATH REPORT.COMMENTS IMP SPEC: NORMAL
PATH REPORT.COMMENTS IMP SPEC: NORMAL
PATH REPORT.RELEVANT HX SPEC: NORMAL

## 2022-11-22 ENCOUNTER — TRANSFERRED RECORDS (OUTPATIENT)
Dept: FAMILY MEDICINE | Facility: CLINIC | Age: 62
End: 2022-11-22

## 2022-11-23 ENCOUNTER — THERAPY VISIT (OUTPATIENT)
Dept: PHYSICAL THERAPY | Facility: CLINIC | Age: 62
End: 2022-11-23
Payer: COMMERCIAL

## 2022-11-23 DIAGNOSIS — Z98.890 S/P REPAIR OF LIGAMENT OF ANKLE: ICD-10-CM

## 2022-11-23 DIAGNOSIS — M25.571 PAIN IN JOINT INVOLVING ANKLE AND FOOT, RIGHT: Primary | ICD-10-CM

## 2022-11-23 PROCEDURE — 97110 THERAPEUTIC EXERCISES: CPT | Mod: GP | Performed by: PHYSICAL THERAPIST

## 2022-12-20 ENCOUNTER — OFFICE VISIT (OUTPATIENT)
Dept: FAMILY MEDICINE | Facility: CLINIC | Age: 62
End: 2022-12-20

## 2022-12-20 VITALS
TEMPERATURE: 98.2 F | HEIGHT: 66 IN | HEART RATE: 66 BPM | WEIGHT: 270 LBS | DIASTOLIC BLOOD PRESSURE: 72 MMHG | BODY MASS INDEX: 43.39 KG/M2 | SYSTOLIC BLOOD PRESSURE: 124 MMHG | OXYGEN SATURATION: 98 %

## 2022-12-20 DIAGNOSIS — E78.2 MIXED HYPERLIPIDEMIA: ICD-10-CM

## 2022-12-20 DIAGNOSIS — E66.01 MORBID OBESITY (H): ICD-10-CM

## 2022-12-20 DIAGNOSIS — E88.810 METABOLIC SYNDROME: ICD-10-CM

## 2022-12-20 DIAGNOSIS — F41.8 SITUATIONAL ANXIETY: Primary | ICD-10-CM

## 2022-12-20 LAB
CHOLEST SERPL-MCNC: 197 MG/DL (ref 0–199)
CHOLEST/HDLC SERPL: 4 {RATIO} (ref 0–5)
GLUCOSE SERPL-MCNC: 104 MG/DL (ref 60–99)
HDLC SERPL-MCNC: 49 MG/DL (ref 40–150)
LDLC SERPL CALC-MCNC: 125 MG/DL (ref 0–130)
TRIGL SERPL-MCNC: 107 MG/DL (ref 0–149)

## 2022-12-20 PROCEDURE — 80061 LIPID PANEL: CPT | Performed by: PHYSICIAN ASSISTANT

## 2022-12-20 PROCEDURE — 36415 COLL VENOUS BLD VENIPUNCTURE: CPT | Performed by: PHYSICIAN ASSISTANT

## 2022-12-20 PROCEDURE — 82947 ASSAY GLUCOSE BLOOD QUANT: CPT | Performed by: PHYSICIAN ASSISTANT

## 2022-12-20 PROCEDURE — 99213 OFFICE O/P EST LOW 20 MIN: CPT | Performed by: PHYSICIAN ASSISTANT

## 2022-12-20 RX ORDER — DIAZEPAM 2 MG
TABLET ORAL
Qty: 10 TABLET | Refills: 0 | Status: SHIPPED | OUTPATIENT
Start: 2022-12-20 | End: 2023-04-18

## 2022-12-20 ASSESSMENT — ANXIETY QUESTIONNAIRES
3. WORRYING TOO MUCH ABOUT DIFFERENT THINGS: NOT AT ALL
2. NOT BEING ABLE TO STOP OR CONTROL WORRYING: NOT AT ALL
5. BEING SO RESTLESS THAT IT IS HARD TO SIT STILL: NOT AT ALL
GAD7 TOTAL SCORE: 0
GAD7 TOTAL SCORE: 0
IF YOU CHECKED OFF ANY PROBLEMS ON THIS QUESTIONNAIRE, HOW DIFFICULT HAVE THESE PROBLEMS MADE IT FOR YOU TO DO YOUR WORK, TAKE CARE OF THINGS AT HOME, OR GET ALONG WITH OTHER PEOPLE: NOT DIFFICULT AT ALL
6. BECOMING EASILY ANNOYED OR IRRITABLE: NOT AT ALL
1. FEELING NERVOUS, ANXIOUS, OR ON EDGE: NOT AT ALL
7. FEELING AFRAID AS IF SOMETHING AWFUL MIGHT HAPPEN: NOT AT ALL

## 2022-12-20 ASSESSMENT — PATIENT HEALTH QUESTIONNAIRE - PHQ9
SUM OF ALL RESPONSES TO PHQ QUESTIONS 1-9: 0
5. POOR APPETITE OR OVEREATING: NOT AT ALL

## 2022-12-20 NOTE — PROGRESS NOTES
Assessment & Plan     Situational anxiety    - diazepam (VALIUM) 2 MG tablet  Dispense: 10 tablet; Refill: 0  I reviewed the patient information handout from Up To Date on this medication including side effects with the patient.  No refills w/o OV    Morbid obesity (H)    - VENOUS COLLECTION  - Lipid Panel (BFP)  - Glucose Fasting (BFP)    Mixed hyperlipidemia    - VENOUS COLLECTION  - Lipid Panel (BFP)    Metabolic syndrome    - VENOUS COLLECTION  - Lipid Panel (BFP)  - Glucose Fasting (BFP)        FUTURE APPOINTMENTS:       - Follow-up visit in 1 year fasting CPE    I have advised to this patient that I will be leaving [facility] end of the year. I have suggested  he/she est. Care with one of our providers in follow up.           PAUL Smart  [facility]    Subjective     Nursing Notes:   Sandy Soriano CMA  12/20/2022  8:08 AM  Signed  Chief Complaint   Patient presents with     Recheck Medication     Refill medications, fasting today     Pre-visit Screening:  Immunizations:  not up to date - shingrix at pharmacy  Colonoscopy:  is up to date  Mammogram: is up to date  Asthma Action Test/Plan:  NA  PHQ9:  DOne today  GAD7:  Done today  Questioned patient about current smoking habits Pt. has never smoked.  Ok to leave detailed message on voice mail for today's visit only Yes, phone # [phone]                Rupal Puri is a 62 year old female who presents to clinic today for the following health issues     HPI       Fasting med check today     Uses Valium sparingly for panic attacks.  <10 per year        Current Medications  Current Outpatient Medications   Medication Sig Dispense Refill     colchicine (COLCYRS) 0.6 MG tablet Take 1 tablet (0.6 mg) by mouth daily       diazepam (VALIUM) 2 MG tablet Take one up to twice daily for anxiety 10 tablet 0     febuxostat (ULORIC) 40 MG TABS tablet Take 1 tablet (40 mg) by mouth       UNABLE TO FIND MEDICATION  "NAME: Tart Cherry with celery seed           Constitutional, HEENT, Cardiovascular, Pulmonary, GI and  systems are negative, except as otherwise noted.          Objective    /72 (BP Location: Right arm, Patient Position: Sitting, Cuff Size: Adult Large)   Pulse 66   Temp 98.2  F (36.8  C) (Temporal)   Ht 1.676 m (5' 6\")   Wt 122.5 kg (270 lb)   LMP 03/10/2013   SpO2 98%   BMI 43.58 kg/m    Body mass index is 43.58 kg/m .  Physical Exam   GENERAL: healthy, alert and no distress  RESP: lungs clear to auscultation - no rales, rhonchi or wheezes  CV: regular rate and rhythm, normal S1 S2, no S3 or S4, no murmur, click or rub, no peripheral edema and peripheral pulses strong  MS: no gross musculoskeletal defects noted    Mental Status Exam:   Appearance: calm  Grooming: adequately groomed  Demeanor: engaged, cooperative  Affect: normal  Speech: Normal.  Gait:Normal.  Movements: Normal  Form of thought: Logical, Linear and Goal directed  Thought content:  Normal  Insight:Good   Judgment: Good   Cognition: Good           "

## 2022-12-20 NOTE — NURSING NOTE
Chief Complaint   Patient presents with     Recheck Medication     Refill medications, fasting today     Pre-visit Screening:  Immunizations:  not up to date - shingrix at pharmacy  Colonoscopy:  is up to date  Mammogram: is up to date  Asthma Action Test/Plan:  NA  PHQ9:  DOne today  GAD7:  Done today  Questioned patient about current smoking habits Pt. has never smoked.  Ok to leave detailed message on voice mail for today's visit only Yes, phone # [phone]

## 2023-01-04 PROBLEM — M25.571 PAIN IN JOINT INVOLVING ANKLE AND FOOT, RIGHT: Status: RESOLVED | Noted: 2019-11-21 | Resolved: 2023-01-04

## 2023-01-04 PROBLEM — Z98.890: Status: RESOLVED | Noted: 2022-06-23 | Resolved: 2023-01-04

## 2023-01-04 NOTE — PROGRESS NOTES
DISCHARGE SUMMARY    Rupal Puri was seen 7 times for evaluation and treatment.  Patient did not return for further treatment and current status is unknown.  Due to short treatment duration, no objective or functional changes were made.  Please see goal flow sheet from episode noted date below and initial evaluation for further information.  Patient is discharged from therapy and therapy episode is resolved as of 01/04/23.      Linked Episodes   Type: Episode: Status: Noted: Resolved: Last update: Updated by:   PHYSICAL THERAPY S/P R ankle 6/22/2022 Active 6/22/2022 11/23/2022  7:45 AM Lucrecia Bettencourt, PT      Comments:     Inquires  Zafar Becker PT, DPT, CSCS  Physical Therapist  [facility], [address] [address] [phone]

## 2023-03-02 ENCOUNTER — HOSPITAL ENCOUNTER (OUTPATIENT)
Dept: MAMMOGRAPHY | Facility: CLINIC | Age: 63
Discharge: HOME | End: 2023-03-02
Attending: OBSTETRICS & GYNECOLOGY | Admitting: OBSTETRICS & GYNECOLOGY
Payer: COMMERCIAL

## 2023-03-02 DIAGNOSIS — Z12.31 VISIT FOR SCREENING MAMMOGRAM: ICD-10-CM

## 2023-03-02 PROCEDURE — 77067 SCR MAMMO BI INCL CAD: CPT

## 2023-04-18 ENCOUNTER — OFFICE VISIT (OUTPATIENT)
Dept: FAMILY MEDICINE | Facility: CLINIC | Age: 63
End: 2023-04-18

## 2023-04-18 VITALS
TEMPERATURE: 97.6 F | HEIGHT: 66 IN | BODY MASS INDEX: 47.09 KG/M2 | HEART RATE: 61 BPM | SYSTOLIC BLOOD PRESSURE: 124 MMHG | OXYGEN SATURATION: 99 % | WEIGHT: 293 LBS | DIASTOLIC BLOOD PRESSURE: 74 MMHG

## 2023-04-18 DIAGNOSIS — R22.1 NECK SWELLING: Primary | ICD-10-CM

## 2023-04-18 LAB
% GRANULOCYTES: 59.8 %
HCT VFR BLD AUTO: 44.5 % (ref 35–47)
HEMOGLOBIN: 14.4 G/DL (ref 11.7–15.7)
LYMPHOCYTES NFR BLD AUTO: 30.2 %
MCH RBC QN AUTO: 30.8 PG (ref 26–33)
MCHC RBC AUTO-ENTMCNC: 32.4 G/DL (ref 31–36)
MCV RBC AUTO: 95.2 FL (ref 78–100)
MONOCYTES NFR BLD AUTO: 10 %
PLATELET COUNT - QUEST: 164 10^9/L (ref 150–375)
RBC # BLD AUTO: 4.67 10*12/L (ref 3.8–5.2)
WBC # BLD AUTO: 8.2 10*9/L (ref 4–11)

## 2023-04-18 PROCEDURE — 85025 COMPLETE CBC W/AUTO DIFF WBC: CPT | Performed by: PHYSICIAN ASSISTANT

## 2023-04-18 PROCEDURE — 36415 COLL VENOUS BLD VENIPUNCTURE: CPT | Performed by: PHYSICIAN ASSISTANT

## 2023-04-18 PROCEDURE — 99213 OFFICE O/P EST LOW 20 MIN: CPT | Performed by: PHYSICIAN ASSISTANT

## 2023-04-18 NOTE — PROGRESS NOTES
"CC: Lump in neck    History:  Has been having some achy pain in right hand, elbow over the past several weeks. Just a few days ago started to notice general puffiness/lump, fluid at the base of right neck. Now just today, started feeling aching pain radiating from ear down to right neck/shoulder. 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 No discoloration. Does feel like she had ringing in her ear but only on left side.Tried sleeping not on her right side last night and seemed swelling and aching seemed pronounced this morning.  No injury, fall, change activity. No mouth/tongue/lip swelling- no SOB/wheezing. Has tried icing. Cannot tolerate NSAIDs. Pt does mention she feels like left neck lymph node has been enlarged for several weeks.     Takes Uloric through her rheumatologist for approximately the past 1 year.    PMH, MEDICATIONS, ALLERGIES, SOCIAL AND FAMILY HISTORY in [facility] and reviewed by me personally.    ROS negative other than the symptoms noted above in the HPI.      Examination   /74 (BP Location: Right arm, Patient Position: Sitting, Cuff Size: Adult Regular)   Pulse 61   Temp 97.6  F (36.4  C) (Temporal)   Ht 1.676 m (5' 6\")   Wt 138.3 kg (305 lb)   LMP 03/10/2013   SpO2 99%   BMI 49.23 kg/m       Constitutional: Sitting comfortably, in no acute distress. Vital signs noted  Ears: external canals and TMs free of abnormalities  Mouth and throat: without erythema or lesions of the mucosa  Neck:  Moderate right supraclavicular adenopathy versus edema. Also left anterior cervical adenopathy. Trachea midline and normal to palpation, thyroid normal to palpation  Cardiovascular:  regular rate and rhythm, no murmurs, clicks, or gallops  Respiratory:  normal respiratory rate and rhythm, lungs clear to auscultation  SKIN: No jaundice/pallor/rash.   Psychiatric: mentation appears normal and affect normal/bright      A/P    ICD-10-CM "    1. Neck swelling  R22.1 VENOUS COLLECTION     HEMOGRAM PLATELET DIFF (BFP)          DISCUSSION:  CBC with diff checked today and normal. Suspect this is inflammatory process leading to local edema or possible lymphedema. Ideally won't take NSAIDs, but doesn't tolerate. Could try heating, resting, Tylenol for pain. Would like to get US of neck to better characterize lymph node involvement. I will order this and contact her with results when available. Proceed to ER with any significant worsening.    follow up visit: As needed    [facility]

## 2023-04-18 NOTE — NURSING NOTE
Chief Complaint   Patient presents with     Mass     Lump near her right collar bone, gives an achey feeling, noticed a few days for the first time       Pre-visit Screening:  Immunizations:  up to date  Colonoscopy:  is up to date  Mammogram: is up to date  Asthma Action Test/Plan:  NA  PHQ9:  NA  GAD7:  NA  Questioned patient about current smoking habits Pt. quit smoking some time ago.  Ok to leave detailed message on voice mail for today's visit only Yes, phone # [phone]

## 2023-04-24 DIAGNOSIS — R22.1 NECK SWELLING: ICD-10-CM

## 2023-04-25 ENCOUNTER — TELEPHONE (OUTPATIENT)
Dept: FAMILY MEDICINE | Facility: CLINIC | Age: 63
End: 2023-04-25

## 2023-04-25 NOTE — TELEPHONE ENCOUNTER
Called and spoke to pt. Area is stable. Informed of US results, CT recommendation. Pt prefers to monitor at this time, but will contact me if symptoms worsen.

## 2023-04-25 NOTE — TELEPHONE ENCOUNTER
Called and spoke to pt. See that encounter.  
Pt called wanting to review results of ultrasound/next steps with Aniyah. She had an ultrasound last Friday of head/neck.  Pt phone #[phone]    
room air

## 2023-08-10 NOTE — MR AVS SNAPSHOT
After Visit Summary   9/7/2018    Rupal Puri    MRN: [id number]           Patient Information     Date Of Birth          1960        Visit Information        Provider Department      9/7/2018 10:45 AM Aniyah Meyers PA-C [facility]        Today's Diagnoses     Multiple joint pain    -  1    Morbid obesity (H)        Acute gout involving toe, unspecified cause, unspecified laterality        Need for vaccination           Follow-ups after your visit        Follow-up notes from your care team     Return if symptoms worsen or fail to improve.      Who to contact     If you have questions or need follow up information about today's clinic visit or your schedule please contact [facility] directly at [phone].  Normal or non-critical lab and imaging results will be communicated to you by [employer], letter or phone within 4 business days after the clinic has received the results. If you do not hear from us within 7 days, please contact the clinic through [employer] or phone. If you have a critical or abnormal lab result, we will notify you by phone as soon as possible.  Submit refill requests through [employer] or call your pharmacy and they will forward the refill request to us. Please allow 3 business days for your refill to be completed.          Additional Information About Your Visit        MyChart Information     [employer] gives you secure access to your electronic health record. If you see a primary care provider, you can also send messages to your care team and make appointments. If you have questions, please call your primary care clinic.  If you do not have a primary care provider, please call [phone] and they will assist you.        Care EveryWhere ID     This is your Care EveryWhere ID. This could be used by other organizations to access your [place] medical records  [id number]        Your Vitals Were     Pulse Temperature Last  LVM to discuss results of DXA scan and to inform pt and family they will need to reach out an call insurance company to determine cost of Evenity injections. [employer] will not release benefit information.    Period Pulse Oximetry BMI (Body Mass Index)       70 97.6  F (36.4  C) (Oral) 03/10/2013 97% 46.65 kg/m2        Blood Pressure from Last 3 Encounters:   09/07/18 130/68   07/28/18 127/72   01/22/18 132/70    Weight from Last 3 Encounters:   09/07/18 131.1 kg (289 lb)   07/27/18 129.3 kg (285 lb)   01/22/18 125 kg (275 lb 9.6 oz)              We Performed the Following     HC FLU VAC PRESRV FREE QUAD SPLIT VIR 3+YRS IM     HEMOGRAM/PLATELET (BFP)     Lymes Antibodies Total ([facility])     URIC ACID ([facility])     VACCINE ADMINISTRATION, INITIAL     VENOUS COLLECTION          Today's Medication Changes          These changes are accurate as of 9/7/18 11:59 PM.  If you have any questions, ask your nurse or doctor.               These medicines have changed or have updated prescriptions.        Dose/Directions    * colchicine 0.6 MG tablet   Commonly known as:  COLCRYS   This may have changed:  Another medication with the same name was added. Make sure you understand how and when to take each.   Used for:  Acute gouty arthritis   Changed by:  Aniyah Meyers PA-C        Take 1 tablet daily   Quantity:  30 tablet   Refills:  0       * colchicine 0.6 MG tablet   Commonly known as:  COLCRYS   This may have changed:  You were already taking a medication with the same name, and this prescription was added. Make sure you understand how and when to take each.   Used for:  Acute gout involving toe, unspecified cause, unspecified laterality   Changed by:  Aniyah Meyers PA-C        Take 2 tabs by mouth at the first sign of a gout attack, then 1 tab 1 hour later.  Max 3 tabs over 1 hour. Then 1 tablet twice daily.   Quantity:  30 tablet   Refills:  0       * Notice:  This list has 2 medication(s) that are the same as other medications prescribed for you. Read the directions carefully, and ask your doctor or other care provider to review them with you.         Where to get your medicines      These medications were sent to  [facility]/pharmacy #[id number] - [place], [place] - [address], [place] [address]     Phone:  [phone]     colchicine 0.6 MG tablet                Primary Care Provider Office Phone # Fax #    PAUL Smart [phone] [address] [facility] [address]        Equal Access to Services     ESTELLA MARIE : Hadii aad ku hadasho Soomaali, waaxda luqadaha, qaybta kaalmada adeegyada, waxay idiin hayaan adeeg kharash la'aan . So [facility] [phone].    ATENCIÓN: Si lakeshiala espgabrielle, tiene a jackson disposición servicios gratuitos de asistencia lingüística. Kelsiame al [phone].    We comply with applicable federal civil rights laws and [place] laws. We do not discriminate on the basis of race, color, national origin, age, disability, sex, sexual orientation, or gender identity.            Thank you!     Thank you for choosing [facility]  for your care. Our goal is always to provide you with excellent care. Hearing back from our patients is one way we can continue to improve our services. Please take a few minutes to complete the written survey that you may receive in the mail after your visit with us. Thank you!             Your Updated Medication List - Protect others around you: Learn how to safely use, store and throw away your medicines at www.disposemymeds.org.          This list is accurate as of 9/7/18 11:59 PM.  Always use your most recent med list.                   Brand Name Dispense Instructions for use Diagnosis    ACETAMINOPHEN PO           * colchicine 0.6 MG tablet    COLCRYS    30 tablet    Take 1 tablet daily    Acute gouty arthritis       * colchicine 0.6 MG tablet    COLCRYS    30 tablet    Take 2 tabs by mouth at the first sign of a gout attack, then 1 tab 1 hour later.  Max 3 tabs over 1 hour. Then 1 tablet twice daily.    Acute gout involving toe, unspecified cause, unspecified laterality       rizatriptan 10 MG ODT  tab    MAXALT-MLT    18 tablet    DISSOLVE 1 TABLET ON TONGUE AT ONSET OF HEADACHE. MAY REPEAT IN 2 HOURS. MAX 3 TABLETS PER DAY    Migraine without aura and without status migrainosus, not intractable       * Notice:  This list has 2 medication(s) that are the same as other medications prescribed for you. Read the directions carefully, and ask your doctor or other care provider to review them with you.

## 2023-09-15 ENCOUNTER — TELEPHONE (OUTPATIENT)
Dept: FAMILY MEDICINE | Facility: CLINIC | Age: 63
End: 2023-09-15

## 2023-09-15 NOTE — TELEPHONE ENCOUNTER
Pt called stating she has a pain in her buttocks in which she thinks is an ischial bursa (after googling). Advised pt to ice, gently stretch, and use anti-inflammatory drugs. Advised she continue to monitor and schedule an office visit if no improvement in a few weeks. Pt had no further questions or concerns.

## 2023-10-03 ENCOUNTER — OFFICE VISIT (OUTPATIENT)
Dept: FAMILY MEDICINE | Facility: CLINIC | Age: 63
End: 2023-10-03

## 2023-10-03 VITALS
TEMPERATURE: 98.6 F | OXYGEN SATURATION: 96 % | HEART RATE: 70 BPM | DIASTOLIC BLOOD PRESSURE: 74 MMHG | SYSTOLIC BLOOD PRESSURE: 124 MMHG

## 2023-10-03 DIAGNOSIS — M70.70 ISCHIAL BURSITIS, UNSPECIFIED LATERALITY: Primary | ICD-10-CM

## 2023-10-03 PROCEDURE — 99213 OFFICE O/P EST LOW 20 MIN: CPT | Performed by: PHYSICIAN ASSISTANT

## 2023-10-03 RX ORDER — METHYLPREDNISOLONE 4 MG
TABLET, DOSE PACK ORAL
Qty: 21 TABLET | Refills: 0 | Status: SHIPPED | OUTPATIENT
Start: 2023-10-03 | End: 2024-02-02

## 2023-10-03 NOTE — NURSING NOTE
Chief Complaint   Patient presents with    Consult     Pt is here with pelvic pain. Hurts to sit after 20 minutes. Around a month its been going on, has gotten much worse. Pt says its the worst when she has been sitting for awhile.    Pre-visit Screening:  Immunizations:  up to date  Colonoscopy:  is up to date  Mammogram: is up to date  Asthma Action Test/Plan:  na  PHQ9:  na  GAD7:  na  Questioned patient about current smoking habits Pt. quit smoking some time ago.  Ok to leave detailed message on voice mail for today's visit only yes, phone # [phone]

## 2023-10-03 NOTE — PROGRESS NOTES
CC: Buttock pain    History:  Rupal is here today with pain in her buttocks when she sits. Happens bilaterally- can go back forth which is worse. Has been ongoing for the past 1 months, and now more recently seems to be getting worse.     Does have sedentary job, but has a sit to stand desk that she has been trying to use more of the stand feature. Can sit for maybe 15 without searing pain. Improves immediately with standing. Denies any radiating pain down legs. Has tried adding cushion, sitting on cushion, stretching. Cannot take NSAIDs due to GI upset. Has not taken Tylenol.     Does have history of bilateral hip replacement, but this does not feel like hip pain.     PMH, MEDICATIONS, ALLERGIES, SOCIAL AND FAMILY HISTORY in EPIC and reviewed by me personally.    ROS negative other than the symptoms noted above in the HPI.    Examination   /74 (BP Location: Left arm, Patient Position: Sitting, Cuff Size: Adult Large)   Pulse 70   Temp 98.6  F (37  C) (Temporal)   LMP 03/10/2013   SpO2 96%      Constitutional: Sitting comfortably, in no acute distress. Vital signs noted  M/S: Tender to palpation over ischial tuberosity bilaterally.   NEURO:  muscle strength normal, sensation to light touch and pinprick normal  SKIN: No jaundice/pallor/rash.   Psychiatric: mentation appears normal and affect normal/bright      A/P    ICD-10-CM    1. Ischial bursitis, unspecified laterality  M70.70 methylPREDNISolone (MEDROL DOSEPAK) 4 MG tablet therapy pack          DISCUSSION:  Ischial bursitis:  Suspect symptoms are from bilateral ischial bursitis. Emphasized to pt that it is most critical for her to avoid sitting as much as possible, and if necessary sit on soft cushion. Given that she is unable to tolerate NSAIDs, recommended trial of Medrol dose pack.Take with food. Warned of side effects like drowsiness, jitteriness, nausea, diarrhea, constipation, weight changes, irritability, mood swings, and to contact me if noted.      Will also refer to orthopedic specialist to consider injection if not improving.         follow up visit: As needed    Aniyah Whittaker PA-C  [facility]

## 2023-12-07 ENCOUNTER — TRANSCRIBE ORDERS (OUTPATIENT)
Dept: OTHER | Age: 63
End: 2023-12-07

## 2023-12-07 ENCOUNTER — PATIENT OUTREACH (OUTPATIENT)
Dept: ONCOLOGY | Facility: CLINIC | Age: 63
End: 2023-12-07
Payer: COMMERCIAL

## 2023-12-07 DIAGNOSIS — Z80.41 FH: MALIGNANT NEOPLASM OF OVARY: Primary | ICD-10-CM

## 2023-12-07 NOTE — PROGRESS NOTES
Writer received referral to Cancer Risk Management/Genetic Counseling.    Referred for:     FH of malignant neoplasm of ovary        Referral reviewed for appropriate plan, and sent to New Patient Scheduling for completion.    Edith Delacruz, RN, BSN  Oncology New Patient Nurse Navigator   [facility]  [phone]

## 2024-01-07 ENCOUNTER — HEALTH MAINTENANCE LETTER (OUTPATIENT)
Age: 64
End: 2024-01-07

## 2024-02-02 ENCOUNTER — OFFICE VISIT (OUTPATIENT)
Dept: FAMILY MEDICINE | Facility: CLINIC | Age: 64
End: 2024-02-02

## 2024-02-02 VITALS
SYSTOLIC BLOOD PRESSURE: 128 MMHG | HEIGHT: 66 IN | TEMPERATURE: 98.1 F | DIASTOLIC BLOOD PRESSURE: 70 MMHG | HEART RATE: 75 BPM | WEIGHT: 293 LBS | BODY MASS INDEX: 47.09 KG/M2 | OXYGEN SATURATION: 96 %

## 2024-02-02 DIAGNOSIS — F41.8 SITUATIONAL ANXIETY: ICD-10-CM

## 2024-02-02 DIAGNOSIS — R73.01 ELEVATED FASTING GLUCOSE: ICD-10-CM

## 2024-02-02 DIAGNOSIS — Z00.00 ENCOUNTER FOR GENERAL HEALTH EXAMINATION: Primary | ICD-10-CM

## 2024-02-02 DIAGNOSIS — E66.01 MORBID OBESITY (H): ICD-10-CM

## 2024-02-02 DIAGNOSIS — R73.03 PREDIABETES: ICD-10-CM

## 2024-02-02 DIAGNOSIS — Z13.228 SCREENING FOR METABOLIC DISORDER: ICD-10-CM

## 2024-02-02 DIAGNOSIS — Z13.220 SCREENING FOR LIPID DISORDERS: ICD-10-CM

## 2024-02-02 LAB
BUN SERPL-MCNC: 21 MG/DL (ref 7–25)
BUN/CREATININE RATIO: 24.7 (ref 6–32)
CALCIUM SERPL-MCNC: 9.3 MG/DL (ref 8.6–10.3)
CHLORIDE SERPLBLD-SCNC: 110.2 MMOL/L (ref 98–110)
CHOLEST SERPL-MCNC: 180 MG/DL (ref 0–199)
CHOLEST/HDLC SERPL: 4 {RATIO} (ref 0–5)
CO2 SERPL-SCNC: 24.2 MMOL/L (ref 20–32)
CREAT SERPL-MCNC: 0.85 MG/DL (ref 0.6–1.3)
GLUCOSE SERPL-MCNC: 116 MG/DL (ref 60–99)
HBA1C MFR BLD: 5.9 % (ref 4–7)
HDLC SERPL-MCNC: 43 MG/DL (ref 40–150)
LDLC SERPL CALC-MCNC: 106 MG/DL (ref 0–130)
POTASSIUM SERPL-SCNC: 4.75 MMOL/L (ref 3.5–5.3)
SODIUM SERPL-SCNC: 142.2 MMOL/L (ref 135–146)
TRIGL SERPL-MCNC: 154 MG/DL (ref 0–149)

## 2024-02-02 PROCEDURE — 83036 HEMOGLOBIN GLYCOSYLATED A1C: CPT | Performed by: PHYSICIAN ASSISTANT

## 2024-02-02 PROCEDURE — 80048 BASIC METABOLIC PNL TOTAL CA: CPT | Performed by: PHYSICIAN ASSISTANT

## 2024-02-02 PROCEDURE — 99396 PREV VISIT EST AGE 40-64: CPT | Performed by: PHYSICIAN ASSISTANT

## 2024-02-02 PROCEDURE — 36415 COLL VENOUS BLD VENIPUNCTURE: CPT | Performed by: PHYSICIAN ASSISTANT

## 2024-02-02 PROCEDURE — 80061 LIPID PANEL: CPT | Performed by: PHYSICIAN ASSISTANT

## 2024-02-02 RX ORDER — METFORMIN HCL 500 MG
500 TABLET, EXTENDED RELEASE 24 HR ORAL
Qty: 90 TABLET | Refills: 0 | Status: SHIPPED | OUTPATIENT
Start: 2024-02-02 | End: 2024-05-03

## 2024-02-02 RX ORDER — DIAZEPAM 2 MG
TABLET ORAL
Qty: 10 TABLET | Refills: 0 | Status: SHIPPED | OUTPATIENT
Start: 2024-02-02

## 2024-02-02 NOTE — NURSING NOTE
Chief Complaint   Patient presents with    Physical     Fasting cpx, goes to OBGYN  Weight loss concerns, maybe start Metformin  Discuss a refill of diazepam         Pre-visit Screening:  Immunizations:  not up to date- considering the Shingrix and will go to the pharmacy  Colonoscopy:  is up to date  Mammogram: is up to date  Asthma Action Test/Plan:  NA  PHQ9:  NA phq2 done today  GAD7:  NA  Questioned patient about current smoking habits Pt. Quit some time ago.  Ok to leave detailed message on voice mail for today's visit only Yes, phone # [phone]

## 2024-02-02 NOTE — PROGRESS NOTES
Chief Complaint:  Physical Exam    SUBJECTIVE:   Rupal Puri is a 63 year old female presents for routine health maintenance.    Current concerns:   Weight concerns:  Frustrated with weight. Feels like she can eats healthy, doesn't snacks. Sticks to [id number]. Exercises 3 times weekly. Works with .     Situational anxiety:  Takes diazepam on rare occasion when she is overwhelmed/panicky working from home and then rarely at night. Knows she can't drive or drink alcohol. Would like refilled.     Menses are regular    Patient's last menstrual period was 03/10/2013.    Was last Pap smear normal: Yes  Due for mammogram:  Yes    Body mass index is 48.42 kg/m .    Present exercise habits:  3-5 times/week  Present dietary habits:  eats regular meals and follows a balanced nutrition diet    Calcium intake: 2-3 servings daily.   Vit D intake: is not taking supplement    Is the patient a smoker? No  If yes, smoking cessation advised and counseling provided.     Cardiovascular risk factors: obesity    Over the past few weeks, have you felt down or depressed? Little interest or pleasure in doing things? No concerns    If in a relationship are there any Domestic violence concern: No    Last dental appointment:  this year  Last optical appointment:  this year    Was the patient born between [birth year] and has not had Hep C testing?  Patient has already been tested    I have reviewed the following histories: Past Medical History, Past Surgical History, Social History, Family History, Problem List, Medication List and Allergies    Past Medical History:   Diagnosis Date    Contact dermatitis and other eczema, due to unspecified cause     Gastric ulcer 2014    LBBB (left bundle branch block)     Migraine, unspecified, without mention of intractable migraine without mention of status migrainosus     Mild intermittent asthma     Obese     PEDRITO (obstructive sleep apnea) 8/22/2022    Plantar fascial fibromatosis       Family History   Problem Relation Age of Onset    Osteoporosis Mother     Ovarian Cancer Mother 35    Hypertension Mother     Leukemia Mother     Cancer Father         kidney    C.A.D. Father         early disease    Gout Father     Macular Degeneration Brother     Factor V Leiden deficiency Brother     Polycythemia Brother     Lipids Brother     Neurologic Disorder Maternal Grandmother         TIA's    Diabetes Maternal Grandmother     Depression Daughter     Anxiety Disorder Daughter     Breast Cancer Maternal Aunt     Breast Cancer Paternal Aunt     Cancer - colorectal No family hx of     Anesthesia Reaction No family hx of     Crohn's Disease No family hx of     Ulcerative Colitis No family hx of     Colon Polyps No family hx of      Social History     Socioeconomic History    Marital status:      Spouse name: Not on file    Number of children: 1    Years of education: 18    Highest education level: Not on file   Occupational History     Employer: OTHER     Comment: [employer]   Tobacco Use    Smoking status: Former     Packs/day: 1.00     Years: 5.00     Additional pack years: 0.00     Total pack years: 5.00     Types: Cigarettes     Quit date: 3/20/1988     Years since quittin.8    Smokeless tobacco: Never   Substance and Sexual Activity    Alcohol use: Yes     Alcohol/week: 2.0 standard drinks of alcohol     Types: 2 Standard drinks or equivalent per week     Comment: 1 drink - a month    Drug use: No    Sexual activity: Not Currently     Birth control/protection: Post-menopausal   Other Topics Concern     Service Not Asked    Blood Transfusions Not Asked    Caffeine Concern Yes     Comment: 3 cups per week     Occupational Exposure Not Asked    Hobby Hazards Not Asked    Sleep Concern Not Asked    Stress Concern Not Asked    Weight Concern Not Asked    Special Diet No     Comment: organic    Back Care Not Asked    Exercise Yes     Comment: walk dog, swimming     Bike Helmet  Not Asked    Seat Belt Yes    Self-Exams No    Parent/sibling w/ CABG, MI or angioplasty before 65F 55M? Not Asked   Social History Narrative    Not on file     Social Determinants of Health     Financial Resource Strain: Low Risk  (10/21/2019)    Overall Financial Resource Strain (CARDIA)     Difficulty of Paying Living Expenses: Not hard at all   Food Insecurity: No Food Insecurity (10/21/2019)    Hunger Vital Sign     Worried About Running Out of Food in the Last Year: Never true     Ran Out of Food in the Last Year: Never true   Transportation Needs: No Transportation Needs (10/21/2019)    PRAPARE - Transportation     Lack of Transportation (Medical): No     Lack of Transportation (Non-Medical): No   Physical Activity: Not on file   Stress: Not on file   Social Connections: Not on file   Interpersonal Safety: Not on file   Housing Stability: Not on file     Past Surgical History:   Procedure Laterality Date    ARTHROPLASTY HIP Left 09/06/2023    ARTHROPLASTY HIP ANTERIOR Right 10/15/2019    Procedure: RIGHT TOTAL HIP ARTHROPLASTY DIRECT ANTERIOR APPROACH  ;  Surgeon: Simon Pitts MD;  Location:  OR    ARTHROPLASTY KNEE  04/23/2013    Procedure: ARTHROPLASTY KNEE;  RIGHT TOTAL KNEE ARTHROPLASTY (IFEOMA)^;  Surgeon: Simon Pitts MD;  Location:  OR    ARTHROPLASTY KNEE  07/17/2014    Procedure: ARTHROPLASTY KNEE;  Surgeon: Simon Pitts MD;  Location:  OR    AS REPAIR FEMORAL HERNIA,REDUCIBLE  1991    hernia repair femoral    ESOPHAGOSCOPY, GASTROSCOPY, DUODENOSCOPY (EGD), COMBINED  07/22/2014    Procedure: COMBINED ESOPHAGOSCOPY, GASTROSCOPY, DUODENOSCOPY (EGD), BIOPSY SINGLE OR MULTIPLE;  Surgeon: Natalie Gibbons MD;  Location:  GI    FOOT SURGERY Right 2021    OSTEOTOMY FOOT Left 05/13/2019    Procedure: LEFT SECOND METATARSALPHALANGEAL CAPSULE REPAIR ; SECOND AND THIRD METATARSAL  OSTEOTOMY;  Surgeon: Christina Durán MD;  Location:  OR    PLACEMENT OF DENTAL IMPLANT(S)  2014     TONSILLECTOMY & ADENOIDECTOMY  1966    age 6       ROS:  E/M: NEGATIVE for ear, nose, mouth and throat problems  R: NEGATIVE for significant/chronic cough or SOB  CV: NEGATIVE for chest pain or palpitations  GI: NEGATIVE for abdominal pain, chronic diarrhea or constipation  :  NEGATIVE for dysuria, hematuria or vaginal discharge. No sexual health concerns.       Current Outpatient Medications   Medication    diazepam (VALIUM) 2 MG tablet    febuxostat (ULORIC) 40 MG TABS tablet    metFORMIN (GLUCOPHAGE XR) 500 MG 24 hr tablet     No current facility-administered medications for this visit.       Patient Active Problem List    Diagnosis Date Noted    PEDRITO (obstructive sleep apnea) 08/22/2022     Priority: Medium    LBBB (left bundle branch block) 03/22/2022     Priority: Medium     Stable - echo  Cardiology work up completed - no Follow up needed if continues to be asymptomatic 3/22      Cardiac arrhythmia, unspecified cardiac arrhythmia type 12/17/2021     Priority: Medium    Undiagnosed cardiac murmurs 12/17/2021     Priority: Medium    Elevated fasting glucose 12/17/2021     Priority: Medium    Acute gouty arthritis 11/11/2020     Priority: Medium    Status post right hip replacement 10/15/2019     Priority: Medium    Morbid obesity (H) 09/07/2018     Priority: Medium    Migraine without aura and without status migrainosus, not intractable 01/05/2018     Priority: Medium    Internal hemorrhoids - per 2017 colonoscopy 03/06/2017     Priority: Medium    Benign neoplasm of cecum 2017 colonoscopy - repeat 2022 03/06/2017     Priority: Medium    Shoulder pain 07/08/2016     Priority: Medium    History of GI bleed 2014 07/22/2014     Priority: Medium     Post ortho surgery while on Lovenox - EGD completed      Health Care Home 03/13/2013     Priority: Medium     State Tier Level:  Tier 2  Status:  NA  Care Coordinator:     See Letters for [facility] Care Plan          Mixed hyperlipidemia 12/22/2010     Priority: Medium     "Metabolic syndrome 12/22/2010     Priority: Medium     Obesity, high TG's and low HDL, fasting glucose borderline      ACP (advance care planning) 01/14/2016     Priority: Low         OBJECTIVE:  /70 (BP Location: Left arm, Patient Position: Sitting, Cuff Size: Adult Large)   Pulse 75   Temp 98.1  F (36.7  C) (Temporal)   Ht 1.676 m (5' 6\")   Wt 136.1 kg (300 lb)   LMP 03/10/2013   SpO2 96%   BMI 48.42 kg/m        General: 63 year old female who appears her stated age. Vital signs noted.  Head: Normocephalic  Eyes: pupils equal round reactive to light and accomodation, extra ocular movements intact  Ears: external canals and TMs free of abnormalities  Nose: patent, without mucosal abnormalities  Mouth and throat: without erythema or lesions of the mucosa  Neck: supple, without adenopathy or thyromegaly  Lungs: clear to auscultation, no wheezing or crackles  Breasts: Goes to OBGYN  Cv: regular rate and rhythm, normal s1 and s2 without murmur or click  Abd: soft, non-tender, no masses, no hepatomegaly or splenomegaly.   (female): Goes to OBGYN  Ms: normal muscle tone & symmetry  Skin: clear to inspection and with no palpable abnormalities.  Neuro: sensation and motor function grossly intact; cranial nerves without obvious abnormalities.    ASSESSMENT/PLAN:    Encounter for general health examination  Prediabetes  Rupal is doing well today. Will update fasting labs and send MyChart. Does have prediabetes and frustrated with her weight, and agreed to start on trial of metformin 1 tablet daily with dinner. Warned of side effects, and to contact me concerns. Refilled #90 days and return at that time to recheck vitals, BMP.     Elevated fasting glucose  - VENOUS COLLECTION  - HEMOGLOBIN A1C ([facility])    Screening for metabolic disorder  - VENOUS COLLECTION  - Basic Metabolic Panel ([facility])    Screening for lipid disorders  - VENOUS COLLECTION  - Lipid Panel ([facility])    Morbid obesity (H)  - metFORMIN (GLUCOPHAGE XR) " "500 MG 24 hr tablet; Take 1 tablet (500 mg) by mouth daily (with dinner)    Situational Anxiety  Agreed to refill #10 diazepam that should last at least 1 year. Warned pt of the possible side effects of benzodiazepine medications like diazepam, including drowsiness. Warned of addictive potential, and urged pt to use sparingly. No alcohol or driving while taking.       reports that she quit smoking about 35 years ago. Her smoking use included cigarettes. She has a 5 pack-year smoking history. She has never used smokeless tobacco.    Estimated body mass index is 48.42 kg/m  as calculated from the following:    Height as of this encounter: 1.676 m (5' 6\").    Weight as of this encounter: 136.1 kg (300 lb).  Weight management plan: Discussed healthy diet and exercise guidelines    Labs pending:      Fasting glucose      Fasting lipids  Meds Suggested:      Vitamin D       Calcium  Tests Recommended:      Regular Dental Examinations        Eye exam        Mammogram yearly  Behavior Modifications:       Cardiovascular exercise 3 times per week--enough to get your Target Heart rate  Other recommendations:     BMI noted and discussed      Regular breast exam     Encouraged My Chart    Counseling Resources:  ATP IV Guidelines  Pooled Cohorts Equation Calculator  Breast Cancer Risk Calculator  FRAX Risk Assessment  ICSI Preventive Guidelines  Dietary Guidelines for Americans, 2010  [employer] My[employer]ate      Aniyah Whittaker PA-C  2/2/2024    "

## 2024-03-05 ENCOUNTER — HOSPITAL ENCOUNTER (OUTPATIENT)
Dept: MAMMOGRAPHY | Facility: CLINIC | Age: 64
Discharge: HOME | End: 2024-03-05
Attending: OBSTETRICS & GYNECOLOGY | Admitting: OBSTETRICS & GYNECOLOGY
Payer: COMMERCIAL

## 2024-03-05 DIAGNOSIS — Z12.31 VISIT FOR SCREENING MAMMOGRAM: ICD-10-CM

## 2024-03-05 PROCEDURE — 77063 BREAST TOMOSYNTHESIS BI: CPT

## 2024-04-08 ENCOUNTER — TELEPHONE (OUTPATIENT)
Dept: FAMILY MEDICINE | Facility: CLINIC | Age: 64
End: 2024-04-08

## 2024-04-08 NOTE — TELEPHONE ENCOUNTER
Pt called stating after receiving her shingrix vaccine on 02/02/24 she developed redness to the area and a rash on that arm for a few weeks. She is having trouble deciding if she should get the second one. I advised if she did not have anaphylaxis she should still receive second one. Reassured her many people get side effects from shingrix but do go away shortly after. Advised she could take an antihistamine. Advised we would not know what kind of reaction she is going to have as many people report each vaccine (1 and 2)are different. Pt had no further questions or concerns.

## 2024-04-29 DIAGNOSIS — E66.01 MORBID OBESITY (H): ICD-10-CM

## 2024-04-30 ENCOUNTER — MYC MEDICAL ADVICE (OUTPATIENT)
Dept: FAMILY MEDICINE | Facility: CLINIC | Age: 64
End: 2024-04-30

## 2024-04-30 RX ORDER — METFORMIN HCL 500 MG
500 TABLET, EXTENDED RELEASE 24 HR ORAL
COMMUNITY
Start: 2024-04-30

## 2024-04-30 NOTE — TELEPHONE ENCOUNTER
Rupal Puri is requesting a refill of:    Refused Prescriptions:                       Disp   Refills    metFORMIN (GLUCOPHAGE XR) 500 MG 24 hr tab*                Sig: TAKE 1 TABLET(500 MG) BY MOUTH DAILY WITH DINNER  Refused By: STANFORD ESPINOSA  Reason for Refusal: Patient needs appointment    Needs OV for refills

## 2024-05-15 ENCOUNTER — VIRTUAL VISIT (OUTPATIENT)
Dept: ONCOLOGY | Facility: CLINIC | Age: 64
End: 2024-05-15
Attending: OBSTETRICS & GYNECOLOGY
Payer: COMMERCIAL

## 2024-05-15 DIAGNOSIS — Z80.41 FAMILY HISTORY OF MALIGNANT NEOPLASM OF OVARY: Primary | ICD-10-CM

## 2024-05-15 DIAGNOSIS — Z80.8 FAMILY HISTORY OF MALIGNANT MELANOMA: ICD-10-CM

## 2024-05-15 DIAGNOSIS — Z80.51 FAMILY HISTORY OF MALIGNANT NEOPLASM OF KIDNEY: ICD-10-CM

## 2024-05-15 DIAGNOSIS — Z80.3 FAMILY HISTORY OF MALIGNANT NEOPLASM OF BREAST: ICD-10-CM

## 2024-05-15 PROCEDURE — 96040 HC GENETIC COUNSELING, EACH 30 MINUTES: CPT | Mod: GT,95 | Performed by: GENETIC COUNSELOR, MS

## 2024-05-15 NOTE — LETTER
May 15, 2024    Rupal Puri  [address]  [facility] [address]      Dear Rupal,    It was a pleasure speaking with you over video on 5/15/2024. Here is a copy of the progress note from our discussion. If you have any additional questions, please feel free to call.    Referring Provider: Rose Harvey MD    Presenting Information:   I met with Rupal for her video genetic counseling visit, through the Cancer Risk Management Program, to discuss her family history of ovarian, breast, and kidney cancer. Today we reviewed this history, cancer screening recommendations, and available genetic testing options.    Personal History:  Rupal is a 64 year old year old female. She does not have any personal history of cancer. She had her first menstrual period at age 12, her first child at age 39, and completed menopause at age 52. Rupal has her ovaries, fallopian tubes and uterus in place, and she has had no ovarian cancer screening to date. She reports that she has not used hormone replacement therapy.      She has annual clinical breast exams and mammograms; her most recent mammogram in 2024 was normal. Rupal began having colonoscopies at the age of 51. Her most recent colonoscopy was reported to be in  and was normal and follow-up was recommended in 10 years. She does not regularly do any other cancer screening at this time. Rupal reported previous tobacco use.    Family History: (Please see scanned pedigree for detailed family history information)  Rupal's mother was reported to be diagnosed with ovarian cancer at age 35. She later had melanoma at age 56 and possibly leukemia.  Rupal's father was diagnosed with kidney cancer at age 45. He was later diagnosed with metastatic disease and  at age 78.  A paternal aunt was diagnosed with breast cancer at age 50 and  at age 83.  A paternal aunt was diagnosed with breast cancer at age 50.  Rupal's father was diagnosed with lung cancer at age 50  and  at age 66. This is thought to be due to environmental factors.  Rupal's brother has a diagnosis of polycythemia vera.  Her maternal and paternal ethnicity is [place]. There is no known Ashkenazi Religion ancestry on either side of her family. There is no reported consanguinity.    Discussion:  Rupal's family history of ovarian, breast, and kidney cancer is suggestive of a hereditary cancer syndrome.  We reviewed the features of sporadic, familial, and hereditary cancers. We discussed that mutations in either BRCA1 or BRCA2 could be possible hereditary explanations for her family history of cancer. Mutations in the BRCA1 or BRCA2 gene are known to cause Hereditary Breast and Ovarian Cancer Syndrome (HBOC). HBOC typically presents with multiple family members diagnosed with breast cancer before age 50 and/or ovarian cancer. Other cancer risks associated with HBOC include male breast cancer, prostate cancer, pancreatic cancer, and melanoma.   We also discussed a few genetic syndromes involving risk for kidney cancer include:     Von Hippel-Lindau syndrome (VHL) is caused by mutations in the VHL gene. A single mutation in the VHL gene increases the risk for kidney cysts and cancer, pheochromocytoma, and neuroendocrine tumors of the pancreas. Pheochromocytomas are usually non-cancerous tumors that occur on the adrenal glands, which sit on top of the kidneys. Renal cell carcinoma (RCC) of the kidneys is cancerous and occurs in 70% of people with VHL by age 60.  Hereditary leiomyomatosis and renal cell cancer syndrome (HLRCC) is caused by mutations in the FH gene. Individuals with HLRCC typically develop tumors of the smooth muscle and skin, called leiomyomas. About 76% of individuals with HLRCC with develop these skin findings. Women can also develop leiomyomas of the uterus. Mutations in the FH gene also lead to higher risk to develop kidney cancer, typically papillary renal cancer type 2. Some data suggests that  FH gene mutations may also lead to risk for paragangliomas (PGLs) or pheochromoctyomas (PCCs).  Wcjf-Wybe-Rsfo syndrome (BHDS) is caused by mutations in the FLCN gene and affects the skin, lungs, and kidneys. Individuals can have benign skin tumors, particularly in the head and neck region. They can also experience pneumothorax (lung collapse) due to cysts that develop on the lungs. Renal tumors (benign or malignant) can be seen in individuals with BHDS.    BAP1 Tumor Predisposition Syndrome is caused by mutations in the BAP1 gene. As this is a rare syndrome, there are currently no lifetime cancer risk estimates for the BAP1-associated cancers. As new information becomes available, more concrete lifetime cancer risks may also become available. Associated cancers and tumors include: atypical Spitz tumors, uveal (eye) melanoma, mesothelioma, cutaneous (skin) melanoma, kidney cancer (typically clear cell) and possibly other cancers (including basal cell carcinoma, breast, thyroid, meningioma, and neuroendocrine tumors). Currently there are no estimates regarding the prevalence or lifetime risk of these cancers.  We discussed the natural history and genetics of hereditary cancer. A detailed handout regarding hereditary cancer, along with the other information we discussed, will be mailed to Rupal at the end of our appointment today and can be found in the after visit summary. Topics included: inheritance pattern, cancer risks, cancer screening recommendations, and also risks, benefits and limitations of testing.  Based on her personal and family history, Rupal meets current National Comprehensive Cancer Network (NCCN) criteria for genetic testing of BRCA1/2 along with other high-penetrance ovarian cancer susceptibility genes (including BRIP1, MLH1, MSH2, MSH6, PMS2, EPCAM, PALB2, RAD51C, and RAD51D) on her maternal side of the family.  Based on her personal and family history, Rupal meets current National Comprehensive  Cancer Network (NCCN) criteria for genetic testing of high-penetrance renal cancer susceptibility genes (Including BAP1, FH, FLCN, MET, SHDA, SHDB, SDHC, SDHD, TSC1, TSC2, and VHL) given that her father was diagnosed with kidney cancer under age 46 and has passed away.  Based on her personal and family history, Rupal meets current National Comprehensive Cancer Network (NCCN) criteria for genetic testing of BRCA1/2 along with other high-penetrance breast cancer susceptibility genes (I.e. CDH1, PALB2, PTEN, and TP53) on her paternal side of the family.  We discussed that there are additional genes that could cause increased risk for ovarian, breast, and/or kidney cancer. As many of these genes present with overlapping features in a family and accurate cancer risk cannot always be established based upon the pedigree analysis alone, it would be reasonable for Rupal to consider panel genetic testing to analyze multiple genes at once.  Genetic testing is available for BRCA1/2 as part of the patient's core panel. This will then be automatically reflexed to a Custom Cancer panel through [facility].  Genetic testing is available for 79 genes associated with increased risk for many different cancers: Custom Cancers panel (AIP, ALK, APC, FÉLIX, AXIN2, BAP1, BARD1, BLM, BMPR1A, BRCA1, BRCA2, BRIP1, BUB1B, CDC73, CDH1, CDK4, CDKN1B, CDKN1C, CDKN2A, CEP57, CHEK2, CTNNA1, DICER1, DIS3L2, EGFR, EPCAM, FH, FLCN, GPC3, GREM1, HOXB13, KIT, LZTR1, MAX, MBD4, MEN1, MET, MITF, MLH1, MSH2, MSH3, MSH6, MUTYH, NF1, NF2, NTHL1, PALB2, PDGFRA, PMS2, POLD1, POLE, POT1, PLTRN6I, PTCH1, PTEN, RAD51C, RAD51D, RB1, REST, RET, SDHA, SDHAF2, SDHB, SDHC, SDHD, SMAD4, SMARCA4, SMARCB1, SMARCE1, STK11, SUFU, MXWK609, TP53, TRIM28, TRIP13, TSC1, TSC2, VHL, and WT1).  We discussed that many of the genes in the Custom Cancers panel are associated with specific hereditary cancer syndromes and have published management guidelines: Hereditary Breast and Ovarian  Cancer syndrome (BRCA1, BRCA2), Wells syndrome (MLH1, MSH2, MSH6, PMS2, EPCAM), Familial Adenomatous Polyposis (APC), Hereditary Diffuse Gastric Cancer (CDH1), Familial Atypical Multiple Mole Melanoma syndrome (CDK4, CDKN2A), Juvenile Polyposis syndrome (BMPR1A, SMAD4), Cowden syndrome (PTEN), Li Fraumeni syndrome (TP53), Peutz-Jeghers syndrome (STK11), MUTYH Associated Polyposis (MUTYH), Hereditary Leiomyomatosis and Renal Cell Cancer (FH), Xehj-Utbl-Gfxd (FLCN), Hereditary Papillary Renal Carcinoma (MET), Hereditary Paraganglioma and Pheochromocytoma syndrome (SDHA, SDHAF2, SDHB, SDHC, SDHD), Multiple Endocrine Neoplasia type 2 (RET), Tuberous sclerosis complex (TSC1, TSC2), Von Hippel-Lindau disease (VHL), Neurofibromatosis type 1 (NF1), Neurofibromatosis type 2 (NF2), Multiple Endocrine Neoplasia type 1 (MEN1), Multiple Endocrine Neoplasia type 4 (CDKN1B), Gorlin syndrome (SUFU, PTCH1), and Girard complex (BIYAE6O).  The FÉLIX, AXIN2, BARD1, BRIP1, CHEK2, GREM1, MSH3, NTHL1, PALB2, POLD1, POLE, RAD51C, and RAD51D genes are associated with increased cancer risk and have published management guidelines for certain cancers.    The remaining genes (AIP, ALK, BAP1, BLM, BUB1B, CDC73, CDKN1C, CEP57, CTNNA1, DICER1, DIS3L2, EGFR, GPC3, HOXB13, KIT, LZTR1, MAX, MBD4, MITF, PDGFRA, POT1, REST, RB1, REST, SMARCA4, SMARCB1, SMARCE1, DBZQ190, TRIM28, TRIP13, and WT1) are associated with increased cancer risk and may allow us to make medical recommendations when mutations are identified.   Rupal would like to submit a blood sample for her genetic testing. She will go to her [facility] at her earliest convenience to get her blood drawn for her genetic testing.   Verbal consent was given over video and written on the consent form. Turnaround time is approximately 4 weeks once the lab receives the sample.  Should Rupal have any questions regarding the billing for her genetic testing, she should visit  [email or web address]'s billing website at [email or web address] or call them at [phone].  The possible outcomes of positive, negative, and uncertain were discussed with Rupal.  Medical Management: For Rupal, we reviewed that the information from genetic testing may determine:  additional cancer screening for which Rupal may qualify (i.e. mammogram and breast MRI, more frequent colonoscopies, more frequent dermatologic exams, etc.),  options for risk reducing surgeries Rupal could consider (i.e. bilateral mastectomy, surgery to remove her ovaries and/or uterus, etc.),    and targeted chemotherapies if she were to develop certain cancers in the future (i.e. immunotherapy for individuals with Wells syndrome, PARP inhibitors, etc.).   These recommendations and possible targeted chemotherapies will be discussed in detail once genetic testing is completed.     Plan:  1) Today Rupal elected to proceed with BRCA1/2 testing with automatic reflex to a Custom Cancer panel through [employer].  2) A copy of the consent form and the after visit summary will be sent to Rupal.  3) This information should be available in approximately 4 weeks, once the lab receives the sample.  4) I will call Rupal with the results once they become available.    Time spent on video: 34 minutes    Dillon Waterman MS, [employer]  Licensed, Certified Genetic Counselor

## 2024-05-15 NOTE — Clinical Note
"    5/15/2024         RE: Rupal Puri  [id number]  [facility] [address]        Dear Colleague,    Thank you for referring your patient, Rupal Puri, to the [facility]. Please see a copy of my visit note below.    Virtual Visit Details    Type of service:  Video Visit     Originating Location (pt. Location): {video visit patient location:350937::\"Home\"}  {PROVIDER LOCATION On-site should be selected for visits conducted from your clinic location or adjoining [facility] hospital, academic office, or other nearby [facility] building. Off-site should be selected for all other provider locations, including home:[id number]}  Distant Location (provider location):  {virtual location provider:[id number]}  Platform used for Video Visit: {Virtual Visit Platforms:680323::\"[employer]\"}    5/15/2024    Referring Provider: Rose Harvey MD    Presenting Information:   I met with Rupal for her video genetic counseling visit, through the Cancer Risk Management Program, to discuss her family history of *** cancer. Today we reviewed this history, cancer screening recommendations, and available genetic testing options.    Personal History:  Rupal is a 64 year old year old female. She was diagnosed with ***; treatment included ***  / does not have any personal history of cancer.    ***She had her first menstrual period at age ***, her first child at age ***, and is ***.  ***Rupal has her ovaries, fallopian tubes and uterus in place, and she has had *** ovarian cancer screening to date. She reports that she *** hormone replacement therapy.      She has *** clinical breast exams and mammograms; her most recent mammogram in *** was ***. ***Rupal began having colonoscopies at the age of ***/Rupal has not had a colonoscopy. Her most recent colonoscopy in *** was *** and follow-up was recommended in ***. ***She does not regularly do any other cancer screening at this time. Rupal reported *** tobacco use and " *** alcohol use.    Family History: (Please see scanned pedigree for detailed family history information)  ***  ***  Her maternal ethnicity is ***. Her paternal ethnicity is ***. There is no known Ashkenazi Shinto ancestry on either side of her family. There is no reported consanguinity.    Discussion:  Rupal's personal and family history of *** is suggestive of a hereditary cancer syndrome.  We reviewed the features of sporadic, familial, and hereditary cancers. ***  We discussed the natural history and genetics of hereditary cancer. A detailed handout regarding hereditary cancer, along with the other information we discussed, will be mailed to Rupal at the end of our appointment today and can be found in the after visit summary. Topics included: inheritance pattern, cancer risks, cancer screening recommendations, and also risks, benefits and limitations of testing.  Based on her personal and family history, Rupal meets current National Comprehensive Cancer Network (NCCN) criteria for genetic testing of ***.  We discussed that there are additional genes that could cause increased risk for *** cancer. As many of these genes present with overlapping features in a family and accurate cancer risk cannot always be established based upon the pedigree analysis alone, it would be reasonable for Rupal to consider panel genetic testing to analyze multiple genes at once.  ***  Rupal stated that she would prefer to submit a saliva kit for her genetic testing. ***[facility] a kit directly to her home with directions on how to collect a saliva sample. We discussed that there is a small chance for sample failure due to contamination of the sample. To help minimize this, she should follow the directions that are sent with the kit. Rupal verbalized understanding of this. Once the sample is collected, she will send it to ***[facility] using the return envelope and prepaid shipping label.   Verbal consent was given over video***  and written on the consent form. Turnaround time is approximately 4 weeks once the lab receives the sample.  ***  The possible outcomes of positive, negative, and uncertain were discussed with Rupal.  Medical Management: For Rupal, we reviewed that the information from genetic testing may determine:  ***surgery to treat Rupal's active cancer diagnosis (i.e. lumpectomy versus bilateral mastectomy, partial versus total colectomy, etc.***),  additional cancer screening for which Rupal may qualify (i.e. mammogram and breast MRI, more frequent colonoscopies, more frequent dermatologic exams, etc.***),  options for risk reducing surgeries Rupal could consider (i.e. bilateral mastectomy, surgery to remove her ovaries and/or uterus, etc.***),    and targeted chemotherapies for Rupal's *** active cancer, or ***if she were to develop certain cancers in the future (i.e. immunotherapy for individuals with Wells syndrome, PARP inhibitors, etc.***).   These recommendations and possible targeted chemotherapies will be discussed in detail once genetic testing is completed.     Plan:  1) Today Rupal elected to proceed with *** testing with automatic reflex to ***.  2) A copy of the consent form and the after visit summary will be sent to Rupal.  3) This information should be available in approximately 4 weeks, once the lab receives the sample.  4) I will call Rupal with the results once they become available.    Time spent on video: *** minutes    Dillon Waterman MS, [facility]  Licensed, Certified Genetic Counselor    ***    Virtual Visit Details  Type of service:  Video Visit     Originating Location (pt. Location): Home  Distant Location (provider location):  Off-site  Platform used for Video Visit: [employer]      Again, thank you for allowing me to participate in the care of your patient.        Sincerely,        Dillon Waterman GC

## 2024-05-15 NOTE — PROGRESS NOTES
5/15/2024    Referring Provider: Rose Harvey MD    Presenting Information:   I met with Rupal for her video genetic counseling visit, through the [facility], to discuss her family history of ovarian, breast, and kidney cancer. Today we reviewed this history, cancer screening recommendations, and available genetic testing options.    Personal History:  Rupal is a 64 year old year old female. She does not have any personal history of cancer. She had her first menstrual period at age 12, her first child at age 39, and completed menopause at age 52. Rupal has her ovaries, fallopian tubes and uterus in place, and she has had no ovarian cancer screening to date. She reports that she has not used hormone replacement therapy.      She has annual clinical breast exams and mammograms; her most recent mammogram in 2024 was normal. Rupal began having colonoscopies at the age of 51. Her most recent colonoscopy was reported to be in  and was normal and follow-up was recommended in 10 years. She does not regularly do any other cancer screening at this time. Rupal reported previous tobacco use.    Family History: (Please see scanned pedigree for detailed family history information)  Rupal's mother was reported to be diagnosed with ovarian cancer at age 35. She later had melanoma at age 56 and possibly leukemia.  Rupal's father was diagnosed with kidney cancer at age 45. He was later diagnosed with metastatic disease and  at age 78.  A paternal aunt was diagnosed with breast cancer at age 50 and  at age 83.  A paternal aunt was diagnosed with breast cancer at age 50.  Rupal's father was diagnosed with lung cancer at age 50 and  at age 66. This is thought to be due to environmental factors.  Rupal's brother has a diagnosis of polycythemia vera.  Her maternal and paternal ethnicity is Faroese. There is no known Ashkenazi Zoroastrian ancestry on either side of her family. There is no reported  consanguinity.    Discussion:  Rupal's family history of ovarian, breast, and kidney cancer is suggestive of a hereditary cancer syndrome.  We reviewed the features of sporadic, familial, and hereditary cancers. We discussed that mutations in either BRCA1 or BRCA2 could be possible hereditary explanations for her family history of cancer. Mutations in the BRCA1 or BRCA2 gene are known to cause Hereditary Breast and Ovarian Cancer Syndrome (HBOC). HBOC typically presents with multiple family members diagnosed with breast cancer before age 50 and/or ovarian cancer. Other cancer risks associated with HBOC include male breast cancer, prostate cancer, pancreatic cancer, and melanoma.   We also discussed a few genetic syndromes involving risk for kidney cancer include:     Von Hippel-Lindau syndrome (VHL) is caused by mutations in the VHL gene. A single mutation in the VHL gene increases the risk for kidney cysts and cancer, pheochromocytoma, and neuroendocrine tumors of the pancreas. Pheochromocytomas are usually non-cancerous tumors that occur on the adrenal glands, which sit on top of the kidneys. Renal cell carcinoma (RCC) of the kidneys is cancerous and occurs in 70% of people with VHL by age 60.  Hereditary leiomyomatosis and renal cell cancer syndrome (HLRCC) is caused by mutations in the FH gene. Individuals with HLRCC typically develop tumors of the smooth muscle and skin, called leiomyomas. About 76% of individuals with HLRCC with develop these skin findings. Women can also develop leiomyomas of the uterus. Mutations in the FH gene also lead to higher risk to develop kidney cancer, typically papillary renal cancer type 2. Some data suggests that FH gene mutations may also lead to risk for paragangliomas (PGLs) or pheochromoctyomas (PCCs).  Logd-Uuta-Ejmq syndrome (BHDS) is caused by mutations in the FLCN gene and affects the skin, lungs, and kidneys. Individuals can have benign skin tumors, particularly in the  head and neck region. They can also experience pneumothorax (lung collapse) due to cysts that develop on the lungs. Renal tumors (benign or malignant) can be seen in individuals with BHDS.    BAP1 Tumor Predisposition Syndrome is caused by mutations in the BAP1 gene. As this is a rare syndrome, there are currently no lifetime cancer risk estimates for the BAP1-associated cancers. As new information becomes available, more concrete lifetime cancer risks may also become available. Associated cancers and tumors include: atypical Spitz tumors, uveal (eye) melanoma, mesothelioma, cutaneous (skin) melanoma, kidney cancer (typically clear cell) and possibly other cancers (including basal cell carcinoma, breast, thyroid, meningioma, and neuroendocrine tumors). Currently there are no estimates regarding the prevalence or lifetime risk of these cancers.  We discussed the natural history and genetics of hereditary cancer. A detailed handout regarding hereditary cancer, along with the other information we discussed, will be mailed to Rupal at the end of our appointment today and can be found in the after visit summary. Topics included: inheritance pattern, cancer risks, cancer screening recommendations, and also risks, benefits and limitations of testing.  Based on her personal and family history, Rupal meets current National Comprehensive Cancer Network (NCCN) criteria for genetic testing of BRCA1/2 along with other high-penetrance ovarian cancer susceptibility genes (including BRIP1, MLH1, MSH2, MSH6, PMS2, EPCAM, PALB2, RAD51C, and RAD51D) on her maternal side of the family.  Based on her personal and family history, Rupal meets current National Comprehensive Cancer Network (NCCN) criteria for genetic testing of high-penetrance renal cancer susceptibility genes (Including BAP1, FH, FLCN, MET, SHDA, SHDB, SDHC, SDHD, TSC1, TSC2, and VHL) given that her father was diagnosed with kidney cancer under age 46 and has passed  away.  Based on her personal and family history, Rupal meets current National Comprehensive Cancer Network (NCCN) criteria for genetic testing of BRCA1/2 along with other high-penetrance breast cancer susceptibility genes (I.e. CDH1, PALB2, PTEN, and TP53) on her paternal side of the family.  We discussed that there are additional genes that could cause increased risk for ovarian, breast, and/or kidney cancer. As many of these genes present with overlapping features in a family and accurate cancer risk cannot always be established based upon the pedigree analysis alone, it would be reasonable for Rupal to consider panel genetic testing to analyze multiple genes at once.  Genetic testing is available for BRCA1/2 as part of the patient's core panel. This will then be automatically reflexed to a Custom Cancer panel through [facility].  Genetic testing is available for 79 genes associated with increased risk for many different cancers: Custom Cancers panel (AIP, ALK, APC, FÉLIX, AXIN2, BAP1, BARD1, BLM, BMPR1A, BRCA1, BRCA2, BRIP1, BUB1B, CDC73, CDH1, CDK4, CDKN1B, CDKN1C, CDKN2A, CEP57, CHEK2, CTNNA1, DICER1, DIS3L2, EGFR, EPCAM, FH, FLCN, GPC3, GREM1, HOXB13, KIT, LZTR1, MAX, MBD4, MEN1, MET, MITF, MLH1, MSH2, MSH3, MSH6, MUTYH, NF1, NF2, NTHL1, PALB2, PDGFRA, PMS2, POLD1, POLE, POT1, QVROO2I, PTCH1, PTEN, RAD51C, RAD51D, RB1, REST, RET, SDHA, SDHAF2, SDHB, SDHC, SDHD, SMAD4, SMARCA4, SMARCB1, SMARCE1, STK11, SUFU, [id number], TP53, TRIM28, TRIP13, TSC1, TSC2, VHL, and WT1).  We discussed that many of the genes in the Custom Cancers panel are associated with specific hereditary cancer syndromes and have published management guidelines: Hereditary Breast and Ovarian Cancer syndrome (BRCA1, BRCA2), Wells syndrome (MLH1, MSH2, MSH6, PMS2, EPCAM), Familial Adenomatous Polyposis (APC), Hereditary Diffuse Gastric Cancer (CDH1), Familial Atypical Multiple Mole Melanoma syndrome (CDK4, CDKN2A), Juvenile Polyposis syndrome (BMPR1A,  SMAD4), Cowden syndrome (PTEN), Li Fraumeni syndrome (TP53), Peutz-Jeghers syndrome (STK11), MUTYH Associated Polyposis (MUTYH), Hereditary Leiomyomatosis and Renal Cell Cancer (FH), Qdop-Zpno-Rpnn (FLCN), Hereditary Papillary Renal Carcinoma (MET), Hereditary Paraganglioma and Pheochromocytoma syndrome (SDHA, SDHAF2, SDHB, SDHC, SDHD), Multiple Endocrine Neoplasia type 2 (RET), Tuberous sclerosis complex (TSC1, TSC2), Von Hippel-Lindau disease (VHL), Neurofibromatosis type 1 (NF1), Neurofibromatosis type 2 (NF2), Multiple Endocrine Neoplasia type 1 (MEN1), Multiple Endocrine Neoplasia type 4 (CDKN1B), Gorlin syndrome (SUFU, PTCH1), and Girard complex (AUHGK1L).  The FÉLIX, AXIN2, BARD1, BRIP1, CHEK2, GREM1, MSH3, NTHL1, PALB2, POLD1, POLE, RAD51C, and RAD51D genes are associated with increased cancer risk and have published management guidelines for certain cancers.    The remaining genes (AIP, ALK, BAP1, BLM, BUB1B, CDC73, CDKN1C, CEP57, CTNNA1, DICER1, DIS3L2, EGFR, GPC3, HOXB13, KIT, LZTR1, MAX, MBD4, MITF, PDGFRA, POT1, REST, RB1, REST, SMARCA4, SMARCB1, SMARCE1, HEPR172, TRIM28, TRIP13, and WT1) are associated with increased cancer risk and may allow us to make medical recommendations when mutations are identified.   Rupal would like to submit a blood sample for her genetic testing. She will go to her [facility] at her earliest convenience to get her blood drawn for her genetic testing.   Verbal consent was given over video and written on the consent form. Turnaround time is approximately 4 weeks once the lab receives the sample.  Should Rupal have any questions regarding the billing for her genetic testing, she should visit [facility] billing website at [email or web address] or call them at [phone].  The possible outcomes of positive, negative, and uncertain were discussed with Rupal.  Medical Management: For Rupal, we reviewed that the  information from genetic testing may determine:  additional cancer screening for which Rupal may qualify (i.e. mammogram and breast MRI, more frequent colonoscopies, more frequent dermatologic exams, etc.),  options for risk reducing surgeries Rupal could consider (i.e. bilateral mastectomy, surgery to remove her ovaries and/or uterus, etc.),    and targeted chemotherapies if she were to develop certain cancers in the future (i.e. immunotherapy for individuals with Wells syndrome, PARP inhibitors, etc.).   These recommendations and possible targeted chemotherapies will be discussed in detail once genetic testing is completed.     Plan:  1) Today Rupal elected to proceed with BRCA1/2 testing with automatic reflex to a Custom Cancer panel through [facility].  2) A copy of the consent form and the after visit summary will be sent to Rupal.  3) This information should be available in approximately 4 weeks, once the lab receives the sample.  4) I will call Rupal with the results once they become available.    Time spent on video: 34 minutes    Dillon Waterman MS, [facility]  Licensed, Certified Genetic Counselor      Virtual Visit Details  Type of service:  Video Visit     Originating Location (pt. Location): Home  Distant Location (provider location):  Off-site  Platform used for Video Visit: Angelika

## 2024-05-15 NOTE — NURSING NOTE
Is the patient currently in the state of [place]? YES    Visit mode:VIDEO    If the visit is dropped, the patient can be reconnected by: VIDEO VISIT: Text to cell phone:   Telephone Information:   Mobile [phone]   Mobile [phone]       Will anyone else be joining the visit? NO  (If patient encounters technical issues they should call [phone]    How would you like to obtain your AVS? MyChart    Are changes needed to the allergy or medication list? N/A    Are refills needed on medications prescribed by this physician?     Reason for visit: Consult    Stacey MORSE

## 2024-05-20 NOTE — PATIENT INSTRUCTIONS
Assessing Cancer Risk  Cancer is a common diagnosis which impacts many families.  Individuals may develop cancer due to environmental factors (such as exposures and lifestyle), aging, genetic predisposition, or a combination of these factors.      Only about 5-10% of cancers are thought to be due to an inherited cancer susceptibility gene.    These families often have:  Several people with the same or related types of cancer  Cancers diagnosed at a young age (before age 50)  Individuals with more than one primary cancer  Multiple generations of the family affected with cancer    Comprehensive Breast and Gynecologic Cancer Panel  We each inherit two copies of every gene in our bodies: one from our mother, and one from our father. Each gene has a specific job to do.  When a gene has a mistake or  mutation  in it, it does not work like it should.     Some people may be candidates for genetic testing of more than one gene.  For these families, genetic testing using a cancer panel may be offered. These panels will test different genes at once known to increase the risk for breast, ovarian, uterine, and/or other cancers.    This handout will review common hereditary breast and gynecologic cancer syndromes. The genes that will be discussed in this handout are: FÉLIX, BRCA1, BRCA2, BRIP1, CDH1, CHEK2, MLH1, MSH2, MSH6, PMS2, EPCAM, PTEN, PALB2, RAD51C, RAD51D, and TP53.    The purpose of this handout is to serve as a brief summary of the breast and gynecologic cancer risk genes that have published clinical management guidelines for individuals who are found to carry a mutation. Inheriting a mutation does not mean a person will develop cancer, but it does significantly increase their risk above the general population risk.     ______________________________________________________________________________    Hereditary Breast and Ovarian Cancer Syndrome (BRCA1 and BRCA2)  A single mutation in one of the copies of BRCA1 or  BRCA2 increases the risk for breast and ovarian cancer, among others.  The risk for pancreatic cancer and melanoma may also be slightly increased in some families.  The chart below shows the chance that someone with a BRCA mutation would develop cancer in his or her lifetime1,2,3,4.       Lifetime Cancer Risks    General Population BRCA1  BRCA2   Breast  12% >60% >60%   Ovarian  1-2% 39-58% 13-29%   Prostate 12% 7-26% 19-61%   Male Breast 0.1% 0.2-1.2% 1.8-7.1%   Pancreas 1-2% Up to 5% 5-10%     A person s ethnic background is also important to consider, as individuals of Ashkenazi Mu-ism ancestry have a higher chance of having a BRCA gene mutation.  There are three BRCA mutations that occur more frequently in this population.      Wells Syndrome (MLH1, MSH2, MSH6, PMS2, and EPCAM)  Currently five genes are known to cause Wells Syndrome: MLH1, MSH2, MSH6, PMS2, and EPCAM.  A single mutation in one of the Wells Syndrome genes increases the risk for colon, endometrial, ovarian, and stomach cancers.  Other cancers that occur less commonly in Wells Syndrome include urinary tract, skin, and brain cancers.  The chart below shows the chance that a person with Wells syndrome would develop cancer in his or her lifetime5.      Lifetime Cancer Risks    General Population Wells Syndrome   Colon 5% 10-61%   Endometrial 3% 13-57%   Ovarian 1-2% 1-38%   Stomach <1% 1-9%   *Cancer risk varies depending on Wells syndrome gene found      Cowden Syndrome (PTEN)  Cowden syndrome is a hereditary condition that increases the risk for breast, thyroid, endometrial, colon, and kidney cancer.  Cowden syndrome is caused by a mutation in the PTEN gene.  A single mutation in one of the copies of PTEN causes Cowden syndrome and increases cancer risk.  The chart below shows the chance that someone with a PTEN mutation would develop cancer in their lifetime6,7.  Other benign features seen in some individuals with Cowden syndrome include benign  skin lesions (facial papules, keratoses, lipomas), learning disability, autism, thyroid nodules, colon polyps, and larger head size.     Lifetime Cancer Risks    General Population Cowden   Breast 12% 40-60%*   Thyroid 1% Up to 38%   Renal 1-2% Up to 35%   Endometrial 3% Up to 28%   Colon 5% Up to 9%   Melanoma 2-3% Up to 6%   *Emerging data suggests the risk for breast cancer could be greater than 60%               Li-Fraumeni Syndrome (TP53)  Li-Fraumeni Syndrome (LFS) is a cancer predisposition syndrome caused by a mutation in the TP53 gene. A single mutation in one of the copies of TP53 increases the risk for multiple cancers. Individuals with LFS are at an increased risk for developing cancer at a young age. The lifetime risk for development of a LFS-associated cancer is 50% by age 30 and 90% by age 60.   Core Cancers: Sarcomas, Breast, Brain, Lung, Leukemias/Lymphomas, Adrenocortical carcinomas  Other Cancers: Gastrointestinal, Thyroid, Skin, Genitourinary       Hereditary Diffuse Gastric Cancer (CDH1)  Currently, one gene is known to cause hereditary diffuse gastric cancer (HDGC): CDH1.  Individuals with HDGC are at increased risk for diffuse gastric cancer and lobular breast cancer. Of people diagnosed with HDGC, 30-50% have a mutation in the CDH1 gene.  This suggests there are likely other genes that may cause HDGC that have not been identified yet.      Lifetime Cancer Risks    General Population HDGC   Diffuse Gastric  <1% ~80%   Breast 12% 41-60%       Additional Genes    FÉLIX  FÉLIX is a moderate-risk breast cancer gene. Women who have a mutation in FÉLIX can have between a 2-4 fold increased risk for breast cancer compared to the general population8. FÉLIX mutations have also been associated with increased risk for pancreatic cancer between 5-10%9. Individuals who inherit two FÉLIX mutations have a condition called ataxia-telangiectasia (AT).  This rare autosomal recessive condition affects the nervous system  and immune system, and is associated with progressive cerebellar ataxia beginning in childhood. Individuals with ataxia-telangiectasia often have a weakened immune system and have an increased risk for childhood cancers.    PALB2  Mutations in PALB2 have been shown to increase the risk of breast cancer up to 41-60% in some families; where individuals fall within this risk range is dependent upon family [id number]. PALB2 mutations have also been associated with increased risk for pancreatic cancer between 5-10%.  Individuals who inherit two PALB2 mutations--one from their mother and one from their father--have a condition called Fanconi Anemia.  This rare autosomal recessive condition is associated with short stature, developmental delay, bone marrow failure, and increased risk for childhood cancers.    CHEK2   CHEK2 is a moderate-risk breast cancer gene.  Women who have a mutation in CHEK2 have around a 2-4 fold increased risk for breast cancer compared to the general population, and this risk may be higher depending upon family history.11,12,13 The risk of colon cancer may be twice as high as the general population risk of colon cancer of 5%. Mutations in CHEK2 have also been shown to increase the risk of other cancers, including prostate, however these cancer risks are currently not well understood.    BRIP1, RAD51C and RAD51D  Mutations in RAD51C and RAD51D have been shown to increase the risk of ovarian cancer and breast cancer 14,. Mutations in BRIP1 have been shown to increase the risk of ovarian cancer and possibly female breast cancer 15 .       Lifetime Cancer Risk    General Population        BRIP1   RAD51C  RAD51D   Breast 12% Not well defined 20-40% 20-40%   Ovarian 1-2% 5-15% 10-15% 10-20%     ______________________________________________________________  Inheritance  All of the cancer syndromes reviewed above are inherited in an autosomal dominant pattern.  This means that if a parent has a mutation,  each of their children will have a 50% chance of inheriting that same mutation. Therefore, each child --male or female-- would have a 50% chance of being at increased risk for developing cancer.    Image obtained from Genetics Home Reference, 2013     Mutations in some genes can occur de lance, which means that a person s mutation occurred for the first time in them and was not inherited from a parent.  Now that they have the mutation, however, it can be passed on to future generations.    Genetic Testing  Genetic testing involves a blood test and will look for any harmful mutations that are associated with increased cancer risk.  If possible, it is recommended that the person(s) who has had cancer be tested before other family members.  That person will give us the most useful information about whether or not a specific gene is associated with the cancer in the family.    Results  There are three possible results of genetic testing:  Positive--a harmful mutation was identified in one or more of the genes  Negative--no mutations were identified in any of the genes tested  Variant of unknown significance--a variation in one of the genes was identified, but it is unclear how this impacts cancer risk in the family    Advantages and Disadvantages   There are advantages and disadvantages to genetic testing.    Advantages  May clarify your cancer risk  Can help you make medical decisions  May explain the cancers in your family  May give useful information to your family members (if you share your results)    Disadvantages  Possible negative emotional impact of learning about inherited cancer risk  Uncertainty in interpreting a negative test result in some situations  Possible genetic discrimination concerns (see below)    Genetic Information Nondiscrimination Act (NATA)  The Genetic Information Nondiscrimination Act of 2008 (NATA) is a federal law that protects individuals from health insurance or employment discrimination  based on a genetic test result alone (with some exceptions, including employers with fewer than 15 employees, and ).  Although rare, NATA  does not cover discrimination protections in terms of life insurance, long term care, or disability insurances.  Visit the National [employer] Research Elliott website to learn more.    Reducing Cancer Risk  All of the genes described in this handout have nationally recognized cancer screening guidelines that would be recommended for individuals who test positive.  In addition to increased cancer screening, surgeries may be offered or recommended to reduce cancer risk.  Recommendations are based upon an individual s genetic test result as well as their personal and family history of cancer.    Questions to Think About Regarding Genetic Testing:  What effect will the test result have on me and my relationship with my family members if I have an inherited gene mutation?  If I don t have a gene mutation?  Should I share my test results, and how will my family react to this news, which may also affect them?  Are my children ready to learn new information that may one day affect their own health?    Hereditary Cancer Resources    FORCE: Facing Our Risk of Cancer Empowered [email or web address]   Bright Pink bebrightpink.org   Li-Fraumeni Syndrome Association lfsassociation.org   PTEN World PTENworld.com   No stomach for cancer, Inc. nostomachforcancer.org   Stomach cancer relief network Scrnet.org   Collaborative Group of the Americas on Inherited Colorectal Cancer (CGA) cgaicc.com    Cancer Care cancercare.org   American Cancer Society (ACS) cancer.org   National Cancer Elliott (NCI) cancer.gov     Please call us if you have any questions or concerns.   Cancer Risk Management Program [phone] ([phone])  Dillon Waterman, MS [facility] [place]  [phone]  Cherise Woodard, MS, [facility] [place] [phone]  Court Israel, MS, [facility] [place]  [phone]  Esthela Gonzales, MS, [facility] [place]  [phone]  Karmen Valle,  [place], [facility] [place]  [phone]  Lucrecia Montgomery, MS, [facility] [place] [phone]  Nancy Spear, MS, [facility] [place] [phone]    References  Jonah Perdomo PDP, Hang S, Aissatou MORAN, Amilcar JE, Rich JL, Nancy N, Rocio H, Wallace O, Ralph A, Pasini B, Radideisi P, Mannargis S, Saqib DM, Munoz N, Ashley E, Abdifatah H, Bourgeois E, Jourdan J, Gronchris J, Romie B, Tulinius H, Thorlacius S, Eerola H, Nevanlinna H, Damion K, Jose Roberto OP. Average risks of breast and ovarian cancer associated with BRCA1 or BRCA2 mutations detected in case series unselected for family history: a combined analysis of 222 studies. Am J Hum Jo Ann. 2003;72:1117-30.  Andres N, Karis M, Carina G.  BRCA1 and BRCA2 Hereditary Breast and Ovarian Cancer. Gene Reviews online. 2013.  Anthony YC, Martin S, Dru G, Shah S. Breast cancer risk among male BRCA1 and BRCA2 mutation carriers. J Natl Cancer Inst. 2007;99:1811-4.  Navjot OCAMPO, Joycelyn I, Elia J, Galindo E, Joaquim ER, Daya F. Risk of breast cancer in male BRCA2 carriers. J Med Jo Ann. 2010;47:710-1.  National Comprehensive Cancer Network. Clinical practice guidelines in oncology, colorectal cancer screening. Available online (registration required). 2015.  Abhinav MH, Michelle J, Marianela J, Howard LIU, Genie MS, Eng C. Lifetime cancer risks in individuals with germline PTEN mutations. Clin Cancer Res. 2012;18:400-7.  Sana R. Cowden Syndrome: A Critical Review of the Clinical Literature. J Jo Ann . 2009:18:13-27.  Nash DOZIER, Yaniv NOE, Cecilia S, Crys P, Adria T, Tamir M, John B, Kathy H, Estuardo R, Minnie K, Judith L, Navjot OCAMPO, Saqib NOE, Wyatt DF, Alaina MR, The Breast Cancer Susceptibility Collaboration (UK) & Lior ESTES. FÉLIX mutations that cause ataxia-telangiectasia are breast cancer susceptibility alleles. Nature Genetics. 2006;38:873-875  Cory N , Laurie Y, Zofia J, Michael L, Curtis ELDER , Oliver ML, Félix S, Manuel AG, Maximilian S, Isha ML, Maxwell J , Aruna R, Lynda MARQUES, Chun  JR, Fernando VE, Kiya M, Voalyssastein B, Damien N, Hien RH, Epifanio KW, and Vladimir AP. FÉLIX mutations in patients with hereditary pancreatic cancer. Cancer Discover. 2012;2:41-46  Tiffani SCHREIBER., et al. Breast-Cancer Risk in Families with Mutations in PALB2. NEJM. 2014; 371(6):497-506.  CHEK2 Breast Cancer Case-Control Consortium. CHEK2*1100delC and susceptibility to breast cancer: A collaborative analysis involving 10,860 breast cancer cases and 9,065 controls from 10 studies. Am J Hum Jo Ann, 74 (2004), pp. 3804-0389  Oliver T, Carmencita S, Boone K, et al. Spectrum of Mutations in BRCA1, BRCA2, CHEK2, and TP53 in Families at High Risk of Breast Cancer. RYDER. 2006;295(12):0710-3416.   Arjun C, Celia D, Bekah DOZIER, et al. Risk of breast cancer in women with a CHEK2 mutation with and without a family history of breast cancer. J Clin Oncol. 2011;29:1094-5634.  Song H, Aroldos E, Ramus SJ, et al. Contribution of germline mutations in the RAD51B, RAD51C, and RAD51D genes to ovarian cancer in the population. J Clin Oncol. 2015;33(26):6425-7879. Doi:10.1200/JCO.2015.61.2408.  Gwen T, Ugo DF, Trino P, et al. Mutations in BRIP1 confer high risk of ovarian cancer. Kerline Jo Ann. 2011;43(11):4666-4212. doi:10.1038/ng.955.

## 2024-05-24 ENCOUNTER — LAB (OUTPATIENT)
Dept: LAB | Facility: CLINIC | Age: 64
End: 2024-05-24
Payer: COMMERCIAL

## 2024-05-24 DIAGNOSIS — Z80.3 FAMILY HISTORY OF MALIGNANT NEOPLASM OF BREAST: ICD-10-CM

## 2024-05-24 DIAGNOSIS — Z80.41 FAMILY HISTORY OF MALIGNANT NEOPLASM OF OVARY: ICD-10-CM

## 2024-05-24 DIAGNOSIS — Z80.51 FAMILY HISTORY OF MALIGNANT NEOPLASM OF KIDNEY: ICD-10-CM

## 2024-05-24 DIAGNOSIS — Z80.8 FAMILY HISTORY OF MALIGNANT MELANOMA: ICD-10-CM

## 2024-05-24 PROCEDURE — 36415 COLL VENOUS BLD VENIPUNCTURE: CPT

## 2024-06-04 DIAGNOSIS — Z80.3 FAMILY HISTORY OF MALIGNANT NEOPLASM OF BREAST: ICD-10-CM

## 2024-06-04 DIAGNOSIS — Z80.51 FAMILY HISTORY OF MALIGNANT NEOPLASM OF KIDNEY: ICD-10-CM

## 2024-06-04 DIAGNOSIS — Z80.8 FAMILY HISTORY OF MALIGNANT MELANOMA: ICD-10-CM

## 2024-06-04 DIAGNOSIS — Z80.41 FAMILY HISTORY OF MALIGNANT NEOPLASM OF OVARY: Primary | ICD-10-CM

## 2024-06-04 LAB — SCANNED LAB RESULT: NORMAL

## 2024-06-12 ENCOUNTER — VIRTUAL VISIT (OUTPATIENT)
Dept: ONCOLOGY | Facility: CLINIC | Age: 64
End: 2024-06-12
Attending: GENETIC COUNSELOR, MS
Payer: COMMERCIAL

## 2024-06-12 DIAGNOSIS — Z80.8 FAMILY HISTORY OF MALIGNANT MELANOMA: ICD-10-CM

## 2024-06-12 DIAGNOSIS — Z80.51 FAMILY HISTORY OF MALIGNANT NEOPLASM OF KIDNEY: ICD-10-CM

## 2024-06-12 DIAGNOSIS — Z80.41 FAMILY HISTORY OF MALIGNANT NEOPLASM OF OVARY: Primary | ICD-10-CM

## 2024-06-12 DIAGNOSIS — Z80.3 FAMILY HISTORY OF MALIGNANT NEOPLASM OF BREAST: ICD-10-CM

## 2024-06-12 PROCEDURE — 999N000069 HC STATISTIC GENETIC COUNSELING, < 16 MIN: Mod: GT,95 | Performed by: GENETIC COUNSELOR, MS

## 2024-06-12 NOTE — NURSING NOTE
Is the patient currently in the state of [place]? YES    Visit mode:VIDEO    If the visit is dropped, the patient can be reconnected by: VIDEO VISIT: Text to cell phone:   Telephone Information:   Mobile [phone]       Will anyone else be joining the visit? NO  (If patient encounters technical issues they should call [phone]    How would you like to obtain your AVS? MyChart    Are changes needed to the allergy or medication list? N/A    Are refills needed on medications prescribed by this physician? NO    Reason for visit: PETE MORSE

## 2024-06-12 NOTE — PROGRESS NOTES
"6/12/2024    Referring Provider: Rose Harvey MD    Presenting Information:  I spoke to Rupal by video today to discuss her genetic testing results. Her blood was drawn on 5/24/24. A Custom Cancer panel was ordered from [facility]. This testing was done because of Rupal's family history of ovarian, kidney, and breast cancer.    Genetic Testing Result: NEGATIVE  Rupal is negative for mutations in the AIP, ALK, APC, FÉLIX, AXIN2, BAP1, BARD1, BLM, BMPR1A, BRCA1, BRCA2, BRIP1, BUB1B, CDC73, CDH1, CDK4, CDKN1B, CDKN1C, CDKN2A, CEP57, CHEK2, CTNNA1, DICER1, DIS3L2, EGFR, EPCAM, FH, FLCN, GPC3, GREM1, HOXB13, KIT, LZTR1, MAX, MBD4, MEN1, MET, MITF, MLH1, MSH2, MSH3, MSH6, MUTYH, NF1, NF2, NTHL1, PALB2, PDGFRA, PMS2, POLD1, POLE, POT1, DGOPP8M, PTCH1, PTEN, RAD51C, RAD51D, RB1, REST, RET, SDHA, SDHAF2, SDHB, SDHC, SDHD, SMAD4, SMARCA4, SMARCB1, SMARCE1, STK11, SUFU, AGXU311, TP53, TRIM28, TRIP13, TSC1, TSC2, VHL, and WT1 genes. No mutations were found in any of the 79 genes analyzed. This test involved sequencing and deletion/duplication analysis of all genes with the exceptions of EPCAM and GREM1 (deletions/duplications only), MITF (only the status of the c.952G>A (p.E318K) alteration is analyzed and reported), and SDHA (sequencing only).      A copy of the test report can be found in the Laboratory tab, dated 5/24/24, and named \"LABORATORY MISCELLANEOUS ORDER\". The report is scanned in as a linked document.    Interpretation:  We discussed several different interpretations of this negative test result.    One explanation may be that there is a different gene or combination of genes and environment that are associated with the cancers in this family.  It is possible that her mother, father, or another close relative did have a mutation in one of these genes and she did not inherit it.  There is also a small possibility that there is a mutation in one of these genes, and the testing laboratory could not find it with their " current testing methods.       Screening:  Based on this negative test result, it is important for Rupal and her relatives to refer back to the family history for appropriate cancer screening.    Due to Rupal's family history of ovarian cancer, Rupal and other close female relatives remain at slightly increased risk for ovarian cancer. We discussed available ovarian cancer screening (pelvic exams, CA-125 blood tests, and transvaginal ultrasounds) as well as the significant limitations of this screening. As such, this screening is not typically recommended. That being said, women in this family should discuss this screening and the signs and symptoms of ovarian cancer with their primary OB/GYN provider, as they may have individualized recommendations.  Due to Rupal's family history of renal cancer, Rupal and other close relatives remain at slightly increased risk for renal cancer. While several screening modalities for renal cancer exist (e.g., urinalysis, ultrasound, etc), clear screening recommendations/strategies do not exist for individuals with family history of renal cancer. That being said, Rupal should discuss this family history, signs and symptoms of renal cancer, and possible screening options with her providers, as they may have individualized recommendations.   Based on her personal and family history, Rupal has a 12.1% lifetime risk of developing breast cancer based on the LOU model. Therefore, Rupal does not meet current National Comprehensive Cancer Network (NCCN) guidelines for high risk breast screening, which is offered to women with a 20% lifetime risk or higher. However, it is still important for Rupal to continue with routine breast screening under the care of her physicians. Breast cancer screening is generally recommended to begin approximately 10 years younger than the earliest age of breast cancer diagnosis in the family, or at age 40, whichever comes first. In this family, screening may  begin at age 40. Rupal is encouraged to discuss breast screening with her physicians.   Other population cancer screening options, such as those recommended by the American Cancer Society and the National Comprehensive Cancer Network (NCCN), are also appropriate for Rupal and her family. These screening recommendations may change if there are changes to Rupal's personal and/or family history of cancer. Final screening recommendations should be made by each individual's primary care provider.      Inheritance:  We reviewed autosomal dominant inheritance. We discussed that Rupal did not pass on an identifiable mutation in these genes to her daughter based on this test result. Mutations in these genes do not skip generations.      Additional Testing Considerations:  Although Rupal's genetic testing result was negative, other relatives may still carry a gene mutation associated with breast, ovarian, and/or kidney cancer. Genetic counseling is recommended for mother, paternal aunt, and siblings to discuss genetic testing options. If any of these relatives do pursue genetic testing, Rupal is encouraged to contact me so that we may review the impact of their test results on her.    Summary:  We do not have an explanation for Rupal's family history of cancer. While no genetic changes were identified, Rupal may still be at risk for certain cancers due to family history, environmental factors, or other genetic causes not identified by this test. Because of that, it is important that she continue with cancer screening based on her personal and family history as discussed above.    Genetic testing is rapidly advancing, and new cancer susceptibility genes will most likely be identified in the future. Therefore, I encouraged Rupal to contact me annually or if there are changes in her personal or family history. This may change how we assess her cancer risk, screening, and the testing we would offer.    Plan:  1.  A copy of the  test results will be sent to Rupal.  2. She plans to follow-up with her other providers.  3. She should contact me regularly, or sooner if her family history changes.    If Rupal has any further questions, I encouraged her to contact me via [email or web address].    Time spent on video: 6 minutes.    Dillon Waterman MS, [facility]  Licensed, Certified Genetic Counselor      Virtual Visit Details    Type of service:  Video Visit     Originating Location (pt. Location): Home  Distant Location (provider location):  Off-site  Platform used for Video Visit: Angelika

## 2024-06-12 NOTE — Clinical Note
"6/12/2024      Rupal Puri  [address] [facility] [address]      Dear Colleague,    Thank you for referring your patient, Rupal Puri, to the [facility]. Please see a copy of my visit note below.    6/12/2024    Virtual Visit Details    Type of service:  Video Visit     Originating Location (pt. Location): {video visit patient location:698297::\"Home\"}  {PROVIDER LOCATION On-site should be selected for visits conducted from your clinic location or adjoining [facility] hospital, academic office, or other nearby [facility] building. Off-site should be selected for all other provider locations, including home:[id number]}  Distant Location (provider location):  {virtual location provider:[id number]}  Platform used for Video Visit: {Virtual Visit Platforms:503256::\"VERTILASWell\"}    Referring Provider: Rose Harvey MD    Presenting Information:  I spoke to Rupal by video today to discuss her genetic testing results. Her blood was drawn on 5/24/24. A Custom Cancer panel was ordered from [facility]. This testing was done because of Rupal's family history of ovarian, kidney, and breast cancer.    Genetic Testing Result: NEGATIVE  Rupal is negative for mutations in the AIP, ALK, APC, FÉLIX, AXIN2, BAP1, BARD1, BLM, BMPR1A, BRCA1, BRCA2, BRIP1, BUB1B, CDC73, CDH1, CDK4, CDKN1B, CDKN1C, CDKN2A, CEP57, CHEK2, CTNNA1, DICER1, DIS3L2, EGFR, EPCAM, FH, FLCN, GPC3, GREM1, HOXB13, KIT, LZTR1, MAX, MBD4, MEN1, MET, MITF, MLH1, MSH2, MSH3, MSH6, MUTYH, NF1, NF2, NTHL1, PALB2, PDGFRA, PMS2, POLD1, POLE, POT1, KBNZV8H, PTCH1, PTEN, RAD51C, RAD51D, RB1, REST, RET, SDHA, SDHAF2, SDHB, SDHC, SDHD, SMAD4, SMARCA4, SMARCB1, SMARCE1, STK11, SUFU, CRFK519, TP53, TRIM28, TRIP13, TSC1, TSC2, VHL, and WT1 genes. No mutations were found in any of the 79 genes analyzed. This test involved sequencing and deletion/duplication analysis of all genes with the exceptions of EPCAM and GREM1 (deletions/duplications only), MITF " "(only the status of the c.952G>A (p.E318K) alteration is analyzed and reported), and SDHA (sequencing only).      A copy of the test report can be found in the Laboratory tab, dated 5/24/24, and named \"LABORATORY MISCELLANEOUS ORDER\". The report is scanned in as a linked document.    Interpretation:  We discussed several different interpretations of this negative test result.    One explanation may be that there is a different gene or combination of genes and environment that are associated with the cancers in this family.  It is possible that her mother, father, or another close relative did have a mutation in one of these genes and she did not inherit it.  There is also a small possibility that there is a mutation in one of these genes, and the testing laboratory could not find it with their current testing methods.       Screening:  Based on this negative test result, it is important for Rupal and her relatives to refer back to the family history for appropriate cancer screening.    Due to Rupal's family history of ovarian cancer, Rupal and other close female relatives remain at slightly increased risk for ovarian cancer. We discussed available ovarian cancer screening (pelvic exams, CA-125 blood tests, and transvaginal ultrasounds) as well as the significant limitations of this screening. As such, this screening is not typically recommended. That being said, women in this family should discuss this screening and the signs and symptoms of ovarian cancer with their primary OB/GYN provider, as they may have individualized recommendations.  Due to Rupal's family history of renal cancer, Rupal and other close relatives remain at slightly increased risk for renal cancer. While several screening modalities for renal cancer exist (e.g., urinalysis, ultrasound, etc), clear screening recommendations/strategies do not exist for individuals with family history of renal cancer. That being said, Rupal should discuss this " family history, signs and symptoms of renal cancer, and possible screening options with her providers, as they may have individualized recommendations.   Based on her personal and family history, Rupal has a 12.1% lifetime risk of developing breast cancer based on the LOU model. Therefore, Rupal does not meet current National Comprehensive Cancer Network (NCCN) guidelines for high risk breast screening, which is offered to women with a 20% lifetime risk or higher. However, it is still important for Rupal to continue with routine breast screening under the care of her physicians. Breast cancer screening is generally recommended to begin approximately 10 years younger than the earliest age of breast cancer diagnosis in the family, or at age 40, whichever comes first. In this family, screening may begin at age 40. Rupal is encouraged to discuss breast screening with her physicians.   Other population cancer screening options, such as those recommended by the American Cancer Society and the National Comprehensive Cancer Network (NCCN), are also appropriate for Rupal and her family. These screening recommendations may change if there are changes to Rupal's personal and/or family history of cancer. Final screening recommendations should be made by each individual's primary care provider.      Inheritance:  We reviewed autosomal dominant inheritance. We discussed that Rupal did not pass on an identifiable mutation in these genes to her daughter based on this test result. Mutations in these genes do not skip generations.      Additional Testing Considerations:  Although Rupal's genetic testing result was negative, other relatives may still carry a gene mutation associated with breast, ovarian, and/or kidney cancer. Genetic counseling is recommended for mother, paternal aunt, and siblings to discuss genetic testing options. If any of these relatives do pursue genetic testing, Rupal is encouraged to contact me so that we may  review the impact of their test results on her.    Summary:  We do not have an explanation for Rupal's family history of cancer. While no genetic changes were identified, Rupal may still be at risk for certain cancers due to family history, environmental factors, or other genetic causes not identified by this test. Because of that, it is important that she continue with cancer screening based on her personal and family history as discussed above.    Genetic testing is rapidly advancing, and new cancer susceptibility genes will most likely be identified in the future. Therefore, I encouraged Rupal to contact me annually or if there are changes in her personal or family history. This may change how we assess her cancer risk, screening, and the testing we would offer.    Plan:  1.  A copy of the test results will be sent to Rupal.  2. She plans to follow-up with her other providers.  3. She should contact me regularly, or sooner if her family history changes.    If Rupal has any further questions, I encouraged her to contact me via [email or web address].    Time spent on video: *** minutes.    Dillon Waterman MS, [facility] [place]  Licensed, Certified Genetic Counselor    ***    6/12/2024    Referring Provider: Rose Harvey MD    Presenting Information:  I spoke to Rupal by video today to discuss her genetic testing results. Her blood was drawn on 5/24/24. A Custom Cancer panel was ordered from [facility]. This testing was done because of Rupal's family history of ovarian, kidney, and breast cancer.    Genetic Testing Result: NEGATIVE  Rupal is negative for mutations in the AIP, ALK, APC, FÉLIX, AXIN2, BAP1, BARD1, BLM, BMPR1A, BRCA1, BRCA2, BRIP1, BUB1B, CDC73, CDH1, CDK4, CDKN1B, CDKN1C, CDKN2A, CEP57, CHEK2, CTNNA1, DICER1, DIS3L2, EGFR, EPCAM, FH, FLCN, GPC3, GREM1, HOXB13, KIT, LZTR1, MAX, MBD4, MEN1, MET, MITF, MLH1, MSH2, MSH3, MSH6, MUTYH, NF1, NF2, NTHL1, PALB2, PDGFRA, PMS2, POLD1, POLE, POT1, SCGOL1L, PTCH1, PTEN, RAD51C, RAD51D, RB1,  "REST, RET, SDHA, SDHAF2, SDHB, SDHC, SDHD, SMAD4, SMARCA4, SMARCB1, SMARCE1, STK11, SUFU, CPSE724, TP53, TRIM28, TRIP13, TSC1, TSC2, VHL, and WT1 genes. No mutations were found in any of the 79 genes analyzed. This test involved sequencing and deletion/duplication analysis of all genes with the exceptions of EPCAM and GREM1 (deletions/duplications only), MITF (only the status of the c.952G>A (p.E318K) alteration is analyzed and reported), and SDHA (sequencing only).      A copy of the test report can be found in the Laboratory tab, dated 5/24/24, and named \"LABORATORY MISCELLANEOUS ORDER\". The report is scanned in as a linked document.    Interpretation:  We discussed several different interpretations of this negative test result.    One explanation may be that there is a different gene or combination of genes and environment that are associated with the cancers in this family.  It is possible that her mother, father, or another close relative did have a mutation in one of these genes and she did not inherit it.  There is also a small possibility that there is a mutation in one of these genes, and the testing laboratory could not find it with their current testing methods.       Screening:  Based on this negative test result, it is important for Rupal and her relatives to refer back to the family history for appropriate cancer screening.    Due to Rupal's family history of ovarian cancer, Rupal and other close female relatives remain at slightly increased risk for ovarian cancer. We discussed available ovarian cancer screening (pelvic exams, CA-125 blood tests, and transvaginal ultrasounds) as well as the significant limitations of this screening. As such, this screening is not typically recommended. That being said, women in this family should discuss this screening and the signs and symptoms of ovarian cancer with their primary OB/GYN provider, as they may have individualized recommendations.  Due to Rupal's " family history of renal cancer, Rupal and other close relatives remain at slightly increased risk for renal cancer. While several screening modalities for renal cancer exist (e.g., urinalysis, ultrasound, etc), clear screening recommendations/strategies do not exist for individuals with family history of renal cancer. That being said, Rupal should discuss this family history, signs and symptoms of renal cancer, and possible screening options with her providers, as they may have individualized recommendations.   Based on her personal and family history, Rupal has a 12.1% lifetime risk of developing breast cancer based on the LOU model. Therefore, Rupal does not meet current National Comprehensive Cancer Network (NCCN) guidelines for high risk breast screening, which is offered to women with a 20% lifetime risk or higher. However, it is still important for Rupal to continue with routine breast screening under the care of her physicians. Breast cancer screening is generally recommended to begin approximately 10 years younger than the earliest age of breast cancer diagnosis in the family, or at age 40, whichever comes first. In this family, screening may begin at age 40. Rupal is encouraged to discuss breast screening with her physicians.   Other population cancer screening options, such as those recommended by the American Cancer Society and the National Comprehensive Cancer Network (NCCN), are also appropriate for Rupal and her family. These screening recommendations may change if there are changes to Rupal's personal and/or family history of cancer. Final screening recommendations should be made by each individual's primary care provider.      Inheritance:  We reviewed autosomal dominant inheritance. We discussed that Rupal did not pass on an identifiable mutation in these genes to her daughter based on this test result. Mutations in these genes do not skip generations.      Additional Testing  Considerations:  Although Rupal's genetic testing result was negative, other relatives may still carry a gene mutation associated with breast, ovarian, and/or kidney cancer. Genetic counseling is recommended for mother, paternal aunt, and siblings to discuss genetic testing options. If any of these relatives do pursue genetic testing, Rupal is encouraged to contact me so that we may review the impact of their test results on her.    Summary:  We do not have an explanation for Rupal's family history of cancer. While no genetic changes were identified, Rupal may still be at risk for certain cancers due to family history, environmental factors, or other genetic causes not identified by this test. Because of that, it is important that she continue with cancer screening based on her personal and family history as discussed above.    Genetic testing is rapidly advancing, and new cancer susceptibility genes will most likely be identified in the future. Therefore, I encouraged Rupal to contact me annually or if there are changes in her personal or family history. This may change how we assess her cancer risk, screening, and the testing we would offer.    Plan:  1.  A copy of the test results will be sent to Rupal.  2. She plans to follow-up with her other providers.  3. She should contact me regularly, or sooner if her family history changes.    If Rupal has any further questions, I encouraged her to contact me via [employer].    Time spent on video: *** minutes.    Dillon Waterman MS, [facility] [place]  Licensed, Certified Genetic Counselor    ***  Virtual Visit Details    Type of service:  Video Visit     Originating Location (pt. Location): Home  Distant Location (provider location):  Off-site  Platform used for Video Visit: JeniWell      Again, thank you for allowing me to participate in the care of your patient.        Sincerely,        Dillon Waterman GC

## 2024-06-12 NOTE — LETTER
"June 12, 2024    [facility]  [address]  [facility] [address]    Dear Rupal,    It was a pleasure speaking with you over video on 6/12/2024. Here is a copy of the progress note from our discussion. If you have any additional questions, please feel free to call.    Referring Provider: Rose Harvey MD    Presenting Information:  I spoke to Rupal by video today to discuss her genetic testing results. Her blood was drawn on 5/24/24. A Custom Cancer panel was ordered from [facility]. This testing was done because of Rupal's family history of ovarian, kidney, and breast cancer.    Genetic Testing Result: NEGATIVE  Rupal is negative for mutations in the AIP, ALK, APC, FÉLIX, AXIN2, BAP1, BARD1, BLM, BMPR1A, BRCA1, BRCA2, BRIP1, BUB1B, CDC73, CDH1, CDK4, CDKN1B, CDKN1C, CDKN2A, CEP57, CHEK2, CTNNA1, DICER1, DIS3L2, EGFR, EPCAM, FH, FLCN, GPC3, GREM1, HOXB13, KIT, LZTR1, MAX, MBD4, MEN1, MET, MITF, MLH1, MSH2, MSH3, MSH6, MUTYH, NF1, NF2, NTHL1, PALB2, PDGFRA, PMS2, POLD1, POLE, POT1, TRZOK5Q, PTCH1, PTEN, RAD51C, RAD51D, RB1, REST, RET, SDHA, SDHAF2, SDHB, SDHC, SDHD, SMAD4, SMARCA4, SMARCB1, SMARCE1, STK11, SUFU, CXXI625, TP53, TRIM28, TRIP13, TSC1, TSC2, VHL, and WT1 genes. No mutations were found in any of the 79 genes analyzed. This test involved sequencing and deletion/duplication analysis of all genes with the exceptions of EPCAM and GREM1 (deletions/duplications only), MITF (only the status of the c.952G>A (p.E318K) alteration is analyzed and reported), and SDHA (sequencing only).      A copy of the test report can be found in the Laboratory tab, dated 5/24/24, and named \"LABORATORY MISCELLANEOUS ORDER\". The report is scanned in as a linked document.    Interpretation:  We discussed several different interpretations of this negative test result.    One explanation may be that there is a different gene or combination of genes and environment that are associated with the cancers in this family.  It " is possible that her mother, father, or another close relative did have a mutation in one of these genes and she did not inherit it.  There is also a small possibility that there is a mutation in one of these genes, and the testing laboratory could not find it with their current testing methods.     Screening:  Based on this negative test result, it is important for Rupal and her relatives to refer back to the family history for appropriate cancer screening.    Due to Rupal's family history of ovarian cancer, Rupal and other close female relatives remain at slightly increased risk for ovarian cancer. We discussed available ovarian cancer screening (pelvic exams, CA-125 blood tests, and transvaginal ultrasounds) as well as the significant limitations of this screening. As such, this screening is not typically recommended. That being said, women in this family should discuss this screening and the signs and symptoms of ovarian cancer with their primary OB/GYN provider, as they may have individualized recommendations.  Due to Rupal's family history of renal cancer, Rupal and other close relatives remain at slightly increased risk for renal cancer. While several screening modalities for renal cancer exist (e.g., urinalysis, ultrasound, etc), clear screening recommendations/strategies do not exist for individuals with family history of renal cancer. That being said, Rupal should discuss this family history, signs and symptoms of renal cancer, and possible screening options with her providers, as they may have individualized recommendations.   Based on her personal and family history, Rupal has a 12.1% lifetime risk of developing breast cancer based on the LOU model. Therefore, Rupal does not meet current National Comprehensive Cancer Network (NCCN) guidelines for high risk breast screening, which is offered to women with a 20% lifetime risk or higher. However, it is still important for Rupal to continue with routine  breast screening under the care of her physicians. Breast cancer screening is generally recommended to begin approximately 10 years younger than the earliest age of breast cancer diagnosis in the family, or at age 40, whichever comes first. In this family, screening may begin at age 40. Rupal is encouraged to discuss breast screening with her physicians.   Other population cancer screening options, such as those recommended by the American Cancer Society and the National Comprehensive Cancer Network (NCCN), are also appropriate for Rupal and her family. These screening recommendations may change if there are changes to Rupal's personal and/or family history of cancer. Final screening recommendations should be made by each individual's primary care provider.      Inheritance:  We reviewed autosomal dominant inheritance. We discussed that Rupal did not pass on an identifiable mutation in these genes to her daughter based on this test result. Mutations in these genes do not skip generations.      Additional Testing Considerations:  Although Rupal's genetic testing result was negative, other relatives may still carry a gene mutation associated with breast, ovarian, and/or kidney cancer. Genetic counseling is recommended for mother, paternal aunt, and siblings to discuss genetic testing options. If any of these relatives do pursue genetic testing, Rupal is encouraged to contact me so that we may review the impact of their test results on her.    Summary:  We do not have an explanation for Rupal's family history of cancer. While no genetic changes were identified, Rupal may still be at risk for certain cancers due to family history, environmental factors, or other genetic causes not identified by this test. Because of that, it is important that she continue with cancer screening based on her personal and family history as discussed above.    Genetic testing is rapidly advancing, and new cancer susceptibility genes will  most likely be identified in the future. Therefore, I encouraged Rupal to contact me annually or if there are changes in her personal or family history. This may change how we assess her cancer risk, screening, and the testing we would offer.          Plan:  1.  A copy of the test results will be sent to Rupal.  2. She plans to follow-up with her other providers.  3. She should contact me regularly, or sooner if her family history changes.    If Rupal has any further questions, I encouraged her to contact me via [employer].    Time spent on video: 6 minutes.    Dlilon Waterman MS, [facility] [place]  Licensed, Certified Genetic Counselor

## 2024-10-07 ENCOUNTER — OFFICE VISIT (OUTPATIENT)
Dept: FAMILY MEDICINE | Facility: CLINIC | Age: 64
End: 2024-10-07

## 2024-10-07 VITALS
OXYGEN SATURATION: 95 % | HEART RATE: 84 BPM | RESPIRATION RATE: 18 BRPM | SYSTOLIC BLOOD PRESSURE: 132 MMHG | BODY MASS INDEX: 44.87 KG/M2 | DIASTOLIC BLOOD PRESSURE: 82 MMHG | WEIGHT: 278 LBS

## 2024-10-07 DIAGNOSIS — M79.2 NERVE PAIN: Primary | ICD-10-CM

## 2024-10-07 PROCEDURE — 99214 OFFICE O/P EST MOD 30 MIN: CPT | Performed by: PHYSICIAN ASSISTANT

## 2024-10-07 RX ORDER — VALACYCLOVIR HYDROCHLORIDE 1 G/1
1000 TABLET, FILM COATED ORAL 3 TIMES DAILY
Qty: 21 TABLET | Refills: 0 | Status: SHIPPED | OUTPATIENT
Start: 2024-10-07 | End: 2024-10-14

## 2024-10-07 NOTE — PROGRESS NOTES
"  Assessment & Plan     Nerve pain-unclear origin of pain, but concerning for pain from previous episode of shingles given pain follows perfect dermatome and not any worse with exercise/doesn't appear MSK in origin. Will trial Valtrex (discussed low likelihood to help, but worth a shot given minimal side effects), pt declines other meds for pain now (consider Cymbalta in the future if appropriate)  Warm compress on area 2x a day for 10 days  Tylenol PRN  See derm for checkup 12/24  Call with any worsening-consider Rx for pain  - valACYclovir (VALTREX) 1000 mg tablet  Dispense: 21 tablet; Refill: 0            Follow up as needed    No follow-ups on file.    Subjective   Rupal is a 64 year old, presenting for the following health issues:  Back Pain (Pain in left side of middle back area, has been around for 2 months, has a scab there that has been around for awhile, has not seen a dermatology appointment recently, feels tender in skin and muscle area, feels some pain that radiates down, is able to move around, started being woken up starting Friday from the pain area)    HPI     Pt presents with back pain. Started 2 months ago, notes tenderness over a \"scab\" which has been there even longer than 2 months ago. Pain radiates around to L side, generally sore/aching. Pain not worse with exercise. She went swimming this morning, pain is severe now. No shingles history, had 1 vaccine. Doesn't feel like MSK pain per patient.     Hasn't seen derm recently. Has appt 12/24.    Review of Systems  Constitutional, neuro, ENT, endocrine, pulmonary, cardiac, gastrointestinal, genitourinary, musculoskeletal, integument and psychiatric systems are negative, except as otherwise noted.      Objective    /82 (BP Location: Left arm, Patient Position: Sitting, Cuff Size: Adult Large)   Pulse 84   Resp 18   Wt 126.1 kg (278 lb)   LMP 03/10/2013   SpO2 95%   BMI 44.87 kg/m    Body mass index is 44.87 kg/m .  Physical Exam "   GENERAL: alert and no distress  NECK: no adenopathy, no asymmetry, masses, or scars  RESP: lungs clear to auscultation - no rales, rhonchi or wheezes  CV: regular rate and rhythm, normal S1 S2, no S3 or S4, 2/6 syst murmur best heard LUSB  ABDOMEN: soft, nontender, no hepatosplenomegaly, no masses and bowel sounds normal  MS: no gross musculoskeletal defects noted, no edema  NEURO: Normal strength and tone, mentation intact and speech normal  Skin: small blister noted over L back. Pain on palpation of dermatome near rash          Signed Electronically by: Pa Ibrahim PA-C

## 2024-10-07 NOTE — NURSING NOTE
Chief Complaint   Patient presents with    Back Pain     Pain in left side of middle back area, has been around for 2 months, has a scab there that has been around for awhile, has not seen a dermatology appointment recently, feels tender in skin and muscle area, feels some pain that radiates down, is able to move around, started being woken up starting Friday from the pain area     Pre-visit Screening:  Immunizations:  up to date  Colonoscopy:  is up to date  Mammogram: is up to date  Asthma Action Test/Plan:  na  PHQ9:  na  GAD7:  na  Questioned patient about current smoking habits Pt. quit smoking some time ago.  Ok to leave detailed message on voice mail for today's visit only yes, phone # [phone] (home)

## 2025-02-04 ENCOUNTER — OFFICE VISIT (OUTPATIENT)
Dept: FAMILY MEDICINE | Facility: CLINIC | Age: 65
End: 2025-02-04

## 2025-02-04 VITALS
OXYGEN SATURATION: 98 % | WEIGHT: 284.4 LBS | DIASTOLIC BLOOD PRESSURE: 86 MMHG | HEART RATE: 72 BPM | HEIGHT: 65 IN | SYSTOLIC BLOOD PRESSURE: 136 MMHG | TEMPERATURE: 97.3 F | BODY MASS INDEX: 47.38 KG/M2

## 2025-02-04 DIAGNOSIS — G47.33 OSA (OBSTRUCTIVE SLEEP APNEA): ICD-10-CM

## 2025-02-04 DIAGNOSIS — E78.2 MIXED HYPERLIPIDEMIA: ICD-10-CM

## 2025-02-04 DIAGNOSIS — R73.03 PREDIABETES: ICD-10-CM

## 2025-02-04 DIAGNOSIS — Z00.00 ROUTINE PHYSICAL EXAMINATION: Primary | ICD-10-CM

## 2025-02-04 DIAGNOSIS — E66.01 MORBID OBESITY (H): ICD-10-CM

## 2025-02-04 DIAGNOSIS — F41.8 SITUATIONAL ANXIETY: ICD-10-CM

## 2025-02-04 LAB
ALBUMIN SERPL-MCNC: 3.9 G/DL (ref 3.6–5.1)
ALP SERPL-CCNC: 51 U/L (ref 33–130)
ALT 1742-6: 14 U/L (ref 0–32)
AST 1920-8: 12 U/L (ref 0–35)
BILIRUB SERPL-MCNC: 0.6 MG/DL (ref 0.2–1.2)
BUN SERPL-MCNC: 20 MG/DL (ref 7–25)
BUN/CREATININE RATIO: 28 (ref 6–32)
CALCIUM SERPL-MCNC: 9.4 MG/DL (ref 8.6–10.3)
CHLORIDE SERPLBLD-SCNC: 109.4 MMOL/L (ref 98–110)
CHOLEST SERPL-MCNC: 213 MG/DL (ref 0–199)
CHOLEST/HDLC SERPL: 5 {RATIO} (ref 0–5)
CO2 SERPL-SCNC: 25.4 MMOL/L (ref 20–32)
CREAT SERPL-MCNC: 0.72 MG/DL (ref 0.6–1.3)
GLUCOSE SERPL-MCNC: 118 MG/DL (ref 60–99)
HDLC SERPL-MCNC: 46 MG/DL (ref 40–150)
HEMOGLOBIN A1C: 5.9 % (ref 4–5.6)
LDLC SERPL CALC-MCNC: 141 MG/DL (ref 0–129)
POTASSIUM SERPL-SCNC: 4.89 MMOL/L (ref 3.5–5.3)
PROT SERPL-MCNC: 6.4 G/DL (ref 6.1–8.1)
SODIUM SERPL-SCNC: 141.1 MMOL/L (ref 135–146)
TRIGL SERPL-MCNC: 130 MG/DL (ref 0–149)

## 2025-02-04 PROCEDURE — 80061 LIPID PANEL: CPT | Performed by: STUDENT IN AN ORGANIZED HEALTH CARE EDUCATION/TRAINING PROGRAM

## 2025-02-04 PROCEDURE — 99396 PREV VISIT EST AGE 40-64: CPT | Mod: 25 | Performed by: STUDENT IN AN ORGANIZED HEALTH CARE EDUCATION/TRAINING PROGRAM

## 2025-02-04 PROCEDURE — 80053 COMPREHEN METABOLIC PANEL: CPT | Performed by: STUDENT IN AN ORGANIZED HEALTH CARE EDUCATION/TRAINING PROGRAM

## 2025-02-04 PROCEDURE — 90677 PCV20 VACCINE IM: CPT | Performed by: STUDENT IN AN ORGANIZED HEALTH CARE EDUCATION/TRAINING PROGRAM

## 2025-02-04 PROCEDURE — 83036 HEMOGLOBIN GLYCOSYLATED A1C: CPT | Performed by: STUDENT IN AN ORGANIZED HEALTH CARE EDUCATION/TRAINING PROGRAM

## 2025-02-04 PROCEDURE — 36415 COLL VENOUS BLD VENIPUNCTURE: CPT | Performed by: STUDENT IN AN ORGANIZED HEALTH CARE EDUCATION/TRAINING PROGRAM

## 2025-02-04 PROCEDURE — 90471 IMMUNIZATION ADMIN: CPT | Performed by: STUDENT IN AN ORGANIZED HEALTH CARE EDUCATION/TRAINING PROGRAM

## 2025-02-04 RX ORDER — COLCHICINE 0.6 MG/1
TABLET ORAL
COMMUNITY
Start: 2025-01-27

## 2025-02-04 RX ORDER — DIAZEPAM 2 MG/1
TABLET ORAL
Qty: 10 TABLET | Refills: 0 | Status: SHIPPED | OUTPATIENT
Start: 2025-02-04

## 2025-02-04 NOTE — PROGRESS NOTES
SUBJECTIVE:   CC: Rupal Puri is an 64 year old woman who presents for preventive health visit.     Nursing Notes:   Rina Alvarez MA  2025  9:06 AM  Signed  Chief Complaint   Patient presents with    Physical     CPX fasting, sees rheumatology for gout but wants a prescription for medrol nicolas     Pre-visit Screening:  Immunizations:  not up to date - declined  Colonoscopy:  is up to date  Mammogram: is up to date  Asthma Action Test/Plan:    PHQ9:  phq2 given  GAD7:    Questioned patient about current smoking habits Pt. former.  Ok to leave detailed message on voice mail for today's visit only yes, phone # [phone] (home)        Patient has been advised of split billing requirements and indicates understanding: Yes    Healthy Habits:  General health: pretty good  Diet: organic  Exercise: swims 2-3 days/wk, home weights  Sleep: c/w cpap  Mental Health: Takes diazepam on rare occasion when she is overwhelmed/panicky working from home and then rarely at night. Knows she can't drive or drink alcohol. Would like refilled.     Reports hx of intermittent bradycardia, HR 35-45  Problems taking medications regularly No  Medication side effects: No  Have you had an eye exam in the past two years? yes  Do you see a dentist twice per year? yes    Today's PHQ-2 Score:       2025     9:03 AM 2024     9:21 AM   PHQ-2 (  [employer])   Q1: Little interest or pleasure in doing things 0 0   Q2: Feeling down, depressed or hopeless 0 0   PHQ-2 Score 0 0     Do you feel safe in your environment? Yes        Social History     Tobacco Use    Smoking status: Former     Current packs/day: 0.00     Average packs/day: 1 pack/day for 5.0 years (5.0 ttl pk-yrs)     Types: Cigarettes     Start date: 3/20/1983     Quit date: 3/20/1988     Years since quittin.9    Smokeless tobacco: Never   Substance Use Topics    Alcohol use: Yes     Alcohol/week: 2.0 standard drinks of alcohol     Types: 2 Standard drinks or  equivalent per week     Comment: rarely     If you drink alcohol do you typically have >3 drinks per day or >7 drinks per week? No                     Reviewed orders with patient.  Reviewed health maintenance and updated orders accordingly - Yes  Lab work is in process  Labs reviewed in EPIC  BP Readings from Last 3 Encounters:   02/04/25 136/86   12/20/24 130/78   10/07/24 132/82    Wt Readings from Last 3 Encounters:   02/04/25 129 kg (284 lb 6.4 oz)   12/20/24 125 kg (275 lb 9.6 oz)   10/07/24 126.1 kg (278 lb)                    FHS-7:       2/24/2022    12:34 PM 2/24/2022    12:36 PM 3/2/2023     3:46 PM 3/5/2024     8:28 AM   Breast CA Risk Assessment (FHS-7)   Did any of your first-degree relatives have breast or ovarian cancer? Yes  Yes Yes   Did any of your relatives have bilateral breast cancer? No  No No   Did any man in your family have breast cancer? No  No No   Did any woman in your family have breast and ovarian cancer? No  No No   Did any woman in your family have breast cancer before age 50 y? No No No No   Do you have 2 or more relatives with breast and/or ovarian cancer? No Yes Yes Yes   Do you have 2 or more relatives with breast and/or bowel cancer? No  Yes No       Mammogram Screening - Mammogram every 1-2 years updated in Health Maintenance based on mutual decision making  Pertinent mammograms are reviewed under the imaging tab.    Pertinent mammograms are reviewed under the imaging tab.  History of abnormal Pap smear: follows with GYN      11/17/2022     9:55 AM   PAP / HPV   PAP Negative for Intraepithelial Lesion or Malignancy (NILM)      Reviewed and updated as needed this visit by clinical staff   Tobacco  Allergies  Meds              Reviewed and updated as needed this visit by Provider                  Past Medical History:   Diagnosis Date    Contact dermatitis and other eczema, due to unspecified cause     Gastric ulcer 2014    LBBB (left bundle branch block)     Migraine,  unspecified, without mention of intractable migraine without mention of status migrainosus     Mild intermittent asthma     Obese     PEDRITO (obstructive sleep apnea) 8/22/2022    Plantar fascial fibromatosis       Past Surgical History:   Procedure Laterality Date    ARTHROPLASTY HIP Left 09/06/2023    ARTHROPLASTY HIP ANTERIOR Right 10/15/2019    Procedure: RIGHT TOTAL HIP ARTHROPLASTY DIRECT ANTERIOR APPROACH  ;  Surgeon: Simon Pitts MD;  Location:  OR    ARTHROPLASTY KNEE  04/23/2013    Procedure: ARTHROPLASTY KNEE;  RIGHT TOTAL KNEE ARTHROPLASTY (IFEOMA)^;  Surgeon: Simon Pitts MD;  Location:  OR    ARTHROPLASTY KNEE  07/17/2014    Procedure: ARTHROPLASTY KNEE;  Surgeon: Simon Pitts MD;  Location:  OR    AS REPAIR FEMORAL HERNIA,REDUCIBLE  1991    hernia repair femoral    ESOPHAGOSCOPY, GASTROSCOPY, DUODENOSCOPY (EGD), COMBINED  07/22/2014    Procedure: COMBINED ESOPHAGOSCOPY, GASTROSCOPY, DUODENOSCOPY (EGD), BIOPSY SINGLE OR MULTIPLE;  Surgeon: Natalie Gibbons MD;  Location:  GI    FOOT SURGERY Right 2021    OSTEOTOMY FOOT Left 05/13/2019    Procedure: LEFT SECOND METATARSALPHALANGEAL CAPSULE REPAIR ; SECOND AND THIRD METATARSAL  OSTEOTOMY;  Surgeon: Christina Durán MD;  Location:  OR    PLACEMENT OF DENTAL IMPLANT(S)  2014    TONSILLECTOMY & ADENOIDECTOMY  1966    age 6     Family History   Problem Relation Age of Onset    Osteoporosis Mother     Ovarian Cancer Mother 35    Hypertension Mother     Leukemia Mother     Cancer Father         kidney    C.A.D. Father         early disease    Gout Father     Macular Degeneration Brother     Factor V Leiden deficiency Brother     Polycythemia Brother     Lipids Brother     Neurologic Disorder Maternal Grandmother         TIA's    Diabetes Maternal Grandmother     Depression Daughter     Anxiety Disorder Daughter     Breast Cancer Maternal Aunt     Breast Cancer Paternal Aunt     Cancer - colorectal No family hx of     Anesthesia  "Reaction No family hx of     Crohn's Disease No family hx of     Ulcerative Colitis No family hx of     Colon Polyps No family hx of         ROS:  12 point ROS performed and negative for new concerns except as mentioned above     OBJECTIVE:   /86 (BP Location: Right arm, Patient Position: Sitting, Cuff Size: Adult Large)   Pulse 72   Temp 97.3  F (36.3  C) (Temporal)   Ht 1.651 m (5' 5\")   Wt 129 kg (284 lb 6.4 oz)   LMP 03/10/2013   SpO2 98%   BMI 47.33 kg/m    EXAM:  GENERAL: alert and no distress  EYES: Eyes grossly normal to inspection, PERRL and conjunctivae and sclerae normal  HENT: ear canals and TM's normal, nose and mouth without ulcers or lesions  NECK: no adenopathy, no asymmetry, masses, or scars  RESP: lungs clear to auscultation - no rales, rhonchi or wheezes  CV: regular rate and rhythm, normal S1 S2, no S3 or S4, no murmur, click or rub, no peripheral edema  ABDOMEN: soft, nontender, no hepatosplenomegaly, no masses and bowel sounds normal  MS: no gross musculoskeletal defects noted, no edema  SKIN: no suspicious lesions or rashes  NEURO: Normal strength and tone, mentation intact and speech normal  PSYCH: mentation appears normal, affect normal/bright    Diagnostic Test Results:  Labs reviewed in Epic    ASSESSMENT/PLAN:       ICD-10-CM    1. Routine physical examination  Z00.00       2. Situational anxiety  F41.8 diazepam (VALIUM) 2 MG tablet      3. Morbid obesity (H)  E66.01 Comprehensive Metobolic Panel (BFP)      4. Prediabetes  R73.03 HEMOGLOBIN A1C (BFP)      5. PEDRITO (obstructive sleep apnea)  G47.33       6. Mixed hyperlipidemia  E78.2 Comprehensive Metobolic Panel (BFP)     Lipid Panel (BFP)         reviewed  Patient has been advised of split billing requirements and indicates understanding: Yes    COUNSELING:   Reviewed preventive health counseling, as reflected in patient instructions       Regular exercise       Healthy diet/nutrition       Vision screening       " "Immunizations - declines flu       Alcohol Use       Osteoporosis prevention/bone health       Colorectal Cancer Screening       Advance Care Planning    Estimated body mass index is 47.33 kg/m  as calculated from the following:    Height as of this encounter: 1.651 m (5' 5\").    Weight as of this encounter: 129 kg (284 lb 6.4 oz).    Weight management plan: Discussed healthy diet and exercise guidelines, not interested in GLP1 at this time    She reports that she quit smoking about 36 years ago. Her smoking use included cigarettes. She started smoking about 41 years ago. She has a 5 pack-year smoking history. She has never used smokeless tobacco.    Patient Instructions   Blood work and pneumonia vaccine today    Monitor HR at home, let me know if < 50. Would consider heart monitor and Cardiology follow-up.    Follow-up yearly, sooner if concerns      David Hinojosa MD, [facility]   "

## 2025-02-04 NOTE — NURSING NOTE
[facility] Hospitalist Progress Note    Patient seen, chart reviewed in detail    Subjective     Started having diarrhea yesterday - tested positive for C. difficile  Night uneventful   Remains lethargic, afebrile and off oxygen    Objective     I/O's    Intake/Output Summary (Last 24 hours) at 7/28/2021 1623  Last data filed at 7/28/2021 0810  Gross per 24 hour   Intake 2233.63 ml   Output 301 ml   Net 1932.63 ml         Current Medications:  Current Facility-Administered Medications   Medication Dose Route Frequency Provider Last Rate Last Admin   • vancomycin (VANCOCIN) 50 MG/ML (compounded) solution 125 mg  125 mg PEG Tube 4x Daily Nita Ceron, CNP   125 mg at 07/28/21 1212   • dextrose 5 % infusion   Intravenous Continuous Sanaz Brewer MD 75 mL/hr at 07/28/21 1515 New Bag at 07/28/21 1515   • ePHEDrine 10 mg injection  10 mg Intravenous Once PRN Rossi Lopez MD       • phenylephrine (CARL-SYNEPHRINE) 100 MCG/ML injection 100 mcg  100 mcg Intravenous PRN Rossi Lopez MD       • albuterol (VENTOLIN) nebulizer 5 mg  5 mg Nebulization Once Rossi Lopez MD       • dextrose 50 % injection 25 g  25 g Intravenous PRN Sanaz Brewer MD       • dextrose 50 % injection 12.5 g  12.5 g Intravenous PRN Sanaz Brewer MD       • glucagon (GLUCAGEN) injection 1 mg  1 mg Intramuscular PRN Sanaz Brewer MD       • dextrose (GLUTOSE) 40 % gel 15 g  15 g Oral PRN Saanz Brewer MD       • dextrose (GLUTOSE) 40 % gel 30 g  30 g Oral PRN Sanaz Brewer MD       • insulin regular (human) (HumuLIN R, NovoLIN R) Correction Dose   Subcutaneous 4 times per day Sanaz Brewer MD   1 Units at 07/28/21 0640   • midodrine (PROAMATINE) tablet 5 mg  5 mg Per NG tube TID AC Sanaz Brewer MD   5 mg at 07/28/21 1212   • polyethylene glycol (MIRALAX) packet 17 g  17 g Oral Daily Nani Yusuf MD   17 g at 07/28/21 0822   • sodium chloride (PF) 0.9 % injection 10 mL  10 mL Injection PRN Be Kaye MD   10 mL at 07/23/21 0122   • sodium chloride (PF) 0.9 % injection 10 mL  10  Chief Complaint   Patient presents with    Physical     CPX fasting, sees rheumatology for gout but wants a prescription for medrol nicolas     Pre-visit Screening:  Immunizations:  not up to date - declined  Colonoscopy:  is up to date  Mammogram: is up to date  Asthma Action Test/Plan:    PHQ9:  phq2 given  GAD7:    Questioned patient about current smoking habits Pt. former.  Ok to leave detailed message on voice mail for today's visit only yes, phone # [phone] (home)        mL Injection 2 times per day Be Kaye MD   10 mL at 07/28/21 0823   • brimonidine (ALPHAGAN) 0.2 % ophthalmic solution 1 drop  1 drop Left Eye Daily Nani Yusuf MD   1 drop at 07/28/21 0823   • dorzolamide-timolol (COSOPT) 22.3-6.8 MG/ML ophthalmic solution 1 drop  1 drop Left Eye Daily Nani Yusuf MD   1 drop at 07/28/21 0822   • prednisoLONE acetate (PRED FORTE) 1 % ophthalmic suspension 1 drop  1 drop Left Eye Daily Nani Yusuf MD   1 drop at 07/28/21 0823   • sodium chloride 0.9 % flush bag 25 mL  25 mL Intravenous PRN Gillian Hurley MD       • sodium chloride (PF) 0.9 % injection 2 mL  2 mL Intracatheter 2 times per day Gillian Hurley MD   2 mL at 07/28/21 0823   • sodium chloride 0.9% infusion   Intravenous Continuous PRN Gillian Hurley MD       • [Held by provider] heparin (porcine) injection 5,000 Units  5,000 Units Subcutaneous 3 times per day Gillian Hurley MD   5,000 Units at 07/26/21 0554       Last Recorded Vitals  Vitals with min/max:      Vital Last Value 24 Hour Range   Temperature 98.1 °F (36.7 °C) (07/28/21 0810) Temp  Min: 96.1 °F (35.6 °C)  Max: 98.1 °F (36.7 °C)   Pulse 96 (07/28/21 1209) Pulse  Min: 81  Max: 96   Respiratory 17 (07/28/21 1209) Resp  Min: 17  Max: 18   Non-Invasive  Blood Pressure (!) 87/70 (07/28/21 1209) BP  Min: 87/70  Max: 113/82   Pulse Oximetry 94 % (07/28/21 1209) SpO2  Min: 94 %  Max: 100 %   Arterial   Blood Pressure   No data recorded        Physical Exam    General: no acute distress   Head: Normocephalic  Ears, nose, mouth and throat:  Oral mucosa dry  Eye: Normal conjunctiva  Cardiovascular:  Regular rate and rhythm, bipedal edema  Respiratory:  Clear lungs  Gastrointestinal:  Soft, Nontender, Non distended  Genitourinary:  No CVAT.   Musculoskeletal: No swelling  Skin:  normal turgor and color  Extremities: no clubbing or cyanosis  Neurological: Lethargic, arousable, left gaze preference, does not follow commands    Labs     Recent Results  (from the past 24 hour(s))   GLUCOSE, BEDSIDE - POINT OF CARE    Collection Time: 07/27/21  5:00 PM   Result Value Ref Range    GLUCOSE, BEDSIDE - POINT OF CARE 159 (H) 70 - 99 mg/dL   C Difficile Toxin by PCR    Collection Time: 07/27/21  5:55 PM    Specimen: Stool   Result Value Ref Range    C. Difficile Toxin PCR Detected (A) Not Detected    C. DIFFICILE TOXIN EIA Not Detected Not Detected   GLUCOSE, BEDSIDE - POINT OF CARE    Collection Time: 07/28/21 12:13 AM   Result Value Ref Range    GLUCOSE, BEDSIDE - POINT OF CARE 164 (H) 70 - 99 mg/dL   Basic Metabolic Panel    Collection Time: 07/28/21  4:42 AM   Result Value Ref Range    Fasting Status      Sodium 139 135 - 145 mmol/L    Potassium 3.8 3.4 - 5.1 mmol/L    Chloride 113 (H) 98 - 107 mmol/L    Carbon Dioxide 14 (L) 21 - 32 mmol/L    Anion Gap 16 10 - 20 mmol/L    Glucose 177 (H) 65 - 99 mg/dL    BUN 29 (H) 6 - 20 mg/dL    Creatinine 0.86 0.51 - 0.95 mg/dL    Glomerular Filtration Rate 69 (L) >90 mL/min/1.73m2    BUN/ Creatinine Ratio 34 (H) 7 - 25    Calcium 5.8 (LL) 8.4 - 10.2 mg/dL   Magnesium    Collection Time: 07/28/21  4:42 AM   Result Value Ref Range    Magnesium 1.7 1.7 - 2.4 mg/dL   CBC with Automated Differential (performable only)    Collection Time: 07/28/21  4:42 AM   Result Value Ref Range    WBC 14.3 (H) 4.2 - 11.0 K/mcL    RBC 3.73 (L) 4.00 - 5.20 mil/mcL    HGB 8.9 (L) 12.0 - 15.5 g/dL    HCT 25.4 (L) 36.0 - 46.5 %    MCV 68.1 (L) 78.0 - 100.0 fl    MCH 23.9 (L) 26.0 - 34.0 pg    MCHC 35.0 32.0 - 36.5 g/dL    RDW-CV 17.9 (H) 11.0 - 15.0 %    RDW-SD 33.6 (L) 39.0 - 50.0 fL     (L) 140 - 450 K/mcL    NRBC 2 (H) <=0 /100 WBC   Manual Differential    Collection Time: 07/28/21  4:42 AM   Result Value Ref Range    Neutrophil, Percent 90 %    Lymphocytes, Percent 8 %    Mono, Percent 2 %    Eosinophils, Percent 0 %    Basophils, Percent 0 %    Absolute Neutrophil 12.9 (H) 1.8 - 7.7 K/mcL    Absolute Lymphocytes 1.1 1.0 - 4.0 K/mcL     Absolute Monocytes 0.3 0.3 - 0.9 K/mcL    Absolute Eosinophils 0.0 0.0 - 0.5 K/mcL    Absolute Basophils 0.0 0.0 - 0.3 K/mcL    [place] Cells Few     Hypochromia Few     Microcytosis Few     Large Platelets Present     Polychromasia Few     Schistocytes Few     Target Cells Moderate     Toxic Granulation Present    GLUCOSE, BEDSIDE - POINT OF CARE    Collection Time: 07/28/21  5:49 AM   Result Value Ref Range    GLUCOSE, BEDSIDE - POINT OF CARE 168 (H) 70 - 99 mg/dL   GLUCOSE, BEDSIDE - POINT OF CARE    Collection Time: 07/28/21 11:40 AM   Result Value Ref Range    GLUCOSE, BEDSIDE - POINT OF CARE 125 (H) 70 - 99 mg/dL       Imaging  CT ABDOMEN PELVIS W CONTRAST   Final Result      1.    Persistent abnormal bowel wall thickening and adjacent fat stranding   involving the rectum, concerning for persistent/progressing proctitis.        2.    Trace free fluid in the left lower quadrant with peritoneal   enhancement, concerning for bacterial peritonitis.      3.    Small bilateral pleural effusions.  Anasarca.      4.    Cholelithiasis versus biliary sludge with mild pericholecystic fluid,   likely reactive.  If there is clinical concern for acute cholecystitis,   consider further evaluation with nuclear medicine HIDA scan.      5.    Hepatic steatosis.  Nonobstructive renal calculi.  Diverticulosis.    Sacral decubitus ulcer.      Electronically Signed by: FEROZ MONTILLA M.D.    Signed on: 7/27/2021 2:54 PM          EGD   Final Result   Addendum 1 of 1      Impression   Overall Impression: Successful/uneventful insertion of a size 20 [place]    G-tube      Recommendation    Tube may be used for medications and water. See orders for resumption of    tube feedings through new gastrostomy tube      Indication   Tamra-pharyngeal dysphagia/malnutrition      Post-Op Dx   See Impression Section      Staff   Staff Role    Rossi Lopez MD Anesthesiologist    Lawson Callaway MD Gastroenterologist    Jennifer Escudero RUST  Jacquelyn Mcnulty, RN Sedation Nurse          Medications   See Anesthesia Record.    Totals unavailable because the procedure time range is not set      Preprocedure   A history and physical has been performed, and patient medication    allergies have been reviewed. The patient's tolerance of previous    anesthesia has been reviewed. The risks and benefits of the procedure and    the sedation options and risks were discussed with the patient. All    questions were answered and informed consent obtained.      Details of the Procedure   Sedation was administered by an anesthesia professional. The patient's    blood pressure, heart rate, level of consciousness, oxygen and    respirations were monitored throughout the procedure. The scope was    introduced through the mouth and advanced to the second part of the    duodenum. Prior to the procedure, the patient's H. Pylori status was    unknown. The patient experienced no blood loss. The procedure was not    difficult. The patient tolerated the procedure well. There were no    apparent complications.       Findings & Interventions   Site along the anterior gastric wall was chosen where maximal    transabdominal illumination and indentation could be accomplished was    chosen.  The abdominal wall was prepped and draped in the usual fashion    followed by topical anesthesia.  An 8 mm incision with a size 11 scalpel    was performed.  A Seldinger needle was introduced into the gastric cavity    under direct video endoscopic imaging with removal of the needle and    maintenance of the sheath.  The sheath was utilized to introduce the    thread which was grasped with a polypectomy snare and retrieved.  The    G-tube apparatus was attached to it and removed across the wall of the    abdomen.  The external bumper was applied followed by application of the    universal adapter and a sterile occlusive dressing. Mild gastric retention    was seen. This was aspirated. The  external bumper was at the 2.5 cm clau         Specimens   No specimens collected         US VASC EXTREMITY UPPER VENOUS DUPLEX   Final Result      1.   No ultrasound evidence of deep venous thrombosis in the bilateral   upper extremities.            Electronically Signed by: CHANA OLGUIN MD    Signed on: 7/22/2021 9:17 PM          CT ABDOMEN PELVIS WO CONTRAST   Final Result      1.   Interval disimpaction of the large amount of stool within the rectum.    Residual perirectal wall thickening and mild stranding, may proctitis.  No   evidence of a perirectal abscess or drainable fluid collection.  No   evidence of perforation.   2.   Significant diverticulosis with no changes of acute diverticulitis.   3.    Mucus impaction within the right lower lobe bronchi and peribronchial   opacities which may represent aspiration and/or infection.   4.    Persistent cortical enhancement due to retained contrast from the   prior contrast-enhanced examination, suggesting renal dysfunction.            Electronically Signed by: CHANA OLGUIN MD    Signed on: 7/21/2021 5:25 PM          US VASC EXTREMITY LOWER VENOUS DUPLEX   Final Result   No lower extremity deep venous thrombosis is seen bilaterally.      Electronically Signed by: LINDSEY MARTIN M.D.    Signed on: 7/19/2021 9:05 PM          CT HEAD WO CONTRAST   Final Result   No acute intracranial process.             Electronically Signed by: BEVERLEY ENGLAND MD    Signed on: 7/19/2021 7:38 PM          CT ABDOMEN PELVIS W CONTRAST - IV contrast only   Final Result   1.   3.2 cm abscess in the left anterior pelvis adjacent to a distended   stool-filled rectosigmoid colon with wall thickening, overall likely   perforated stercoral colitis.  Tiny foci of free air in the anterior upper   abdomen.  There is diverticulosis and perforated diverticulitis is also in   the differential.   2.   Dilated loops of small bowel may represent partial obstruction, versus   ileus given the  above findings.  There may be a transition point in the   right lower quadrant terminal ileum.   3.   DVT in the left common femoral and external iliac vein.  Consider   confirmation with venous Doppler ultrasound.   4.    Gallbladder sludge with pericholecystic edema, nonspecific.  Small   gallstones on recent ultrasound.  Sonographic Deluna sign was negative   which favors against cholecystitis, but it is difficult to entirely   exclude.  Ultrasound also suggested underlying liver disease which can   account for gallbladder wall thickening /pericholecystic edema.      Findings and recommendations were discussed with Dr. Gertrude Perez at 7:56   PM on 07/19/2021.      Electronically Signed by: JAVI STOREY MD    Signed on: 7/19/2021 8:08 PM          US LIVER GALLBLADDER PANCREAS   Final Result   1.    Extensive gallbladder sludge with small gallstones.  No definite   cholecystitis.  However, prominent 6 mm gallbladder wall thickening is   nonspecific in the setting of adjacent presumed hepatocellular disease.    Sonographic Deluna sign was negative.     2.   Echogenic heterogeneous liver suggestive of hepatic   steatosis/hepatocellular disease.  Correlate clinically.         Electronically Signed by: JAVI STOREY MD    Signed on: 7/19/2021 6:35 PM          XR CHEST PA OR AP 1 VIEW   Final Result          Assessment and Plan    1. Severe sepsis with bacteremia - likely 2/2 intraabdominal source  2. Stercoral colitis   - remains afebrile, WBC essentially unchanged   - c/w IV ATB per ID  3. C diff colitis - on po vanco  4. Encephalopathy - likely metabolic  5. HyperNa - mild, resolved   - will discontinue FWF and change IVF to LR  6. Anemia - stable  7. Severe debility - patient appears hospice appropriate, but family not yet ready for it   8. Recurrent hypotension - c/w midodrine  9. Hypocalcemia - replaced this morning, will recheck lytes in am    Prognosis remains poor in view of patient's advanced age and multiple  comorbidities  D/w RN    Porter Brewer MD  7/28/2021

## 2025-02-04 NOTE — PATIENT INSTRUCTIONS
Blood work and pneumonia vaccine today    Monitor HR at home, let me know if < 50. Would consider heart monitor and Cardiology follow-up.    Follow-up yearly, sooner if concerns

## 2025-03-07 ENCOUNTER — HOSPITAL ENCOUNTER (OUTPATIENT)
Dept: MAMMOGRAPHY | Facility: CLINIC | Age: 65
Discharge: HOME | End: 2025-03-07
Attending: PHYSICIAN ASSISTANT | Admitting: PHYSICIAN ASSISTANT
Payer: COMMERCIAL

## 2025-03-07 DIAGNOSIS — Z12.31 SCREENING MAMMOGRAM, ENCOUNTER FOR: ICD-10-CM

## 2025-03-07 PROCEDURE — 77063 BREAST TOMOSYNTHESIS BI: CPT

## 2025-03-07 PROCEDURE — 77067 SCR MAMMO BI INCL CAD: CPT

## 2025-03-18 ENCOUNTER — TRANSFERRED RECORDS (OUTPATIENT)
Dept: FAMILY MEDICINE | Facility: CLINIC | Age: 65
End: 2025-03-18

## 2025-04-10 ENCOUNTER — OFFICE VISIT (OUTPATIENT)
Dept: FAMILY MEDICINE | Facility: CLINIC | Age: 65
End: 2025-04-10

## 2025-04-10 VITALS
WEIGHT: 280 LBS | DIASTOLIC BLOOD PRESSURE: 78 MMHG | SYSTOLIC BLOOD PRESSURE: 124 MMHG | HEART RATE: 70 BPM | OXYGEN SATURATION: 97 % | TEMPERATURE: 97.8 F | BODY MASS INDEX: 46.59 KG/M2

## 2025-04-10 DIAGNOSIS — J01.00 ACUTE NON-RECURRENT MAXILLARY SINUSITIS: Primary | ICD-10-CM

## 2025-04-10 RX ORDER — AZITHROMYCIN 250 MG/1
TABLET, FILM COATED ORAL
Qty: 6 TABLET | Refills: 0 | Status: SHIPPED | OUTPATIENT
Start: 2025-04-10 | End: 2025-04-15

## 2025-04-10 NOTE — PROGRESS NOTES
Assessment & Plan     1. Acute non-recurrent maxillary sinusitis (Primary)  - Symptoms and exam are consistent with sinusitis, likely bacterial given she has had symptoms for >10 days. Will treat with Azithromycin x 5 days, RX sent, side effects reviewed. Patient is aware of significant interactions between her taking antibiotic with Colchicine, states she will not take Colchicine while taking antibiotic and in general has not taken Colchicine in a long time. Also encouraged her to do saline nasal rinses followed by steroid NS such as Flonase for congestion. Return to clinic and ER precautions discussed.   - azithromycin (ZITHROMAX) 250 MG tablet; Take 2 tablets (500 mg) by mouth daily for 1 day, THEN 1 tablet (250 mg) daily for 4 days.  Dispense: 6 tablet; Refill: 0    Follow up as needed. Reasons to follow-up sooner or seek emergent care reviewed.     Hayley Christiansen PA-C  [facility]       Subjective     Rupal Puri is a 65 year old female who presents to clinic today for the following health issues:    HPI   Chief Complaint   Patient presents with    URI     Cough and congestion started 3/27, head congestion and pressure, headache, swollen glands in throat, thick mucous when blowing her nose      Rupal presents today with concerns of an illness. States her symptoms started two weeks ago initially with sneezing, nasal congestion, and a cough and since then have progressed to worsening congestion, runny nose, post-nasal drainage, ear popping, and enlarged lymph nodes in her neck area. Also has been having headaches and a lot of pressure over her forehead and cheek area along with green/yellow drainage from her nose. States she thinks she is having a sinus infection, notes she has had them in the past. Notes she is leaving tomorrow for a road trip for the next 5 weeks. She denies any symptoms of fevers/chills, sore throat, ear pain, cough, SOB, wheezing, or any GI symptoms. Has been taking  dayquil for her symptoms.     Daily medications reviewed.       Objective    /78 (BP Location: Right arm, Patient Position: Sitting, Cuff Size: Adult Large)   Pulse 70   Temp 97.8  F (36.6  C) (Temporal)   Wt 127 kg (280 lb)   LMP 03/10/2013   SpO2 97%   BMI 46.59 kg/m    Body mass index is 46.59 kg/m .    Physical Examination:  GENERAL: healthy, alert and no distress  EYES: Eyes grossly normal to inspection, PERRL and conjunctivae and sclerae normal  HENT: ear canals and TM's normal, congestion present, mild tenderness noted over maxillary sinuses bilaterally, and mouth without ulcers or lesions  NECK: no adenopathy, no asymmetry, masses, or scars and thyroid normal to palpation  RESP: lungs clear to auscultation - no rales, rhonchi or wheezes  CV: regular rate and rhythm, normal S1 S2, no S3 or S4, no murmur, click or rub, no peripheral edema   ABDOMEN: soft and non-tender  MS: no gross musculoskeletal defects noted, no edema  SKIN: no suspicious lesions or rashes  PSYCH: mentation appears normal, affect normal/bright

## 2025-04-10 NOTE — NURSING NOTE
Chief Complaint   Patient presents with    URI     Cough and congestion started 3/27, head congestion and pressure, headache, swollen glands in throat, thick mucous when blowing her nose     Pre-visit Screening:  Immunizations:  not up to date - shingrix at pharmacy  Colonoscopy:  is up to date  Mammogram: is up to date  Asthma Action Test/Plan:  NA  PHQ9:  NA  GAD7:  NA  Questioned patient about current smoking habits Pt. quit smoking some time ago.  Ok to leave detailed message on voice mail for today's visit only Yes, phone # [phone]

## 2025-07-14 ENCOUNTER — TELEPHONE (OUTPATIENT)
Dept: FAMILY MEDICINE | Facility: CLINIC | Age: 65
End: 2025-07-14

## 2025-07-14 NOTE — TELEPHONE ENCOUNTER
Patient left a message asking about Physical Therapy for her shoulder.     I left a message for patient to franca me back - will review with patient when she calls me back.

## 2025-07-15 NOTE — TELEPHONE ENCOUNTER
Patient left a message at 4:06 pm that she remembered that a provider from [facility] suggested/ordered Physical Therapy for her left shoulder. Patient said that she will be calling [facility] to get the order/referral for her to have PT for her left shoulder. Patient did state no need to call her back.       Side Note -- Dr. Hinojosa did issue did issue a Physical Therapy Referral 7.9.2025 for patient to se been at     Physical / Occupational Therapy  [facility]   [address] [address]  [phone] -- appt line  [phone] -- fax     For--   Pain of left thigh   Status post left hip replacement   Status post total left knee replacement       [facility] did confirm that they received Dr. Hinojosa's Referral.

## (undated) DEVICE — SU DERMABOND PRINEO 22CM CLR222US

## (undated) DEVICE — WIPES FOLEY CARE SURESTEP PROVON DFC100

## (undated) DEVICE — IMP SCR BONE CAN ACE 6.5X20MM 1217-20-500: Type: IMPLANTABLE DEVICE | Site: HIP | Status: NON-FUNCTIONAL

## (undated) DEVICE — BNDG ELASTIC 4"X5YDS UNSTERILE 6611-40

## (undated) DEVICE — NDL 21GA 1.5"

## (undated) DEVICE — SU MONOCRYL 3-0 PS-2 27" Y427H

## (undated) DEVICE — PACK TOTAL HIP W/U DRAPE SOP15HUFSC

## (undated) DEVICE — GLOVE PROTEXIS W/NEU-THERA 8.0  2D73TE80

## (undated) DEVICE — DRAPE STERI U 1015

## (undated) DEVICE — SOLUTION WOUND CLEANSING 3/4OZ 10% PVP EA-L3011FB-50

## (undated) DEVICE — BNDG ELASTIC 4" DBL LENGTH UNSTERILE 6611-14

## (undated) DEVICE — GLOVE PROTEXIS W/NEU-THERA 8.5  2D73TE85

## (undated) DEVICE — ESU PENCIL W/SMOKE EVAC NEPTUNE STRYKER 0703-046-000

## (undated) DEVICE — SOL NACL 0.9% INJ 250ML BAG 2B1322Q

## (undated) DEVICE — SU VICRYL 2-0 CP-1 27" UND J266H

## (undated) DEVICE — SYR 50ML LL W/O NDL 309653

## (undated) DEVICE — SU STRATAFIX PDS PLUS 2-0 SPIRAL CT-1 30CM SXPP1B410

## (undated) DEVICE — GLOVE PROTEXIS BLUE W/NEU-THERA 8.0  2D73EB80

## (undated) DEVICE — BONE CLEANING TIP INTERPULSE  0210-010-000

## (undated) DEVICE — GLOVE PROTEXIS POWDER FREE 7.5 ORTHOPEDIC 2D73ET75

## (undated) DEVICE — PREP DURAPREP 26ML APL 8630

## (undated) DEVICE — GOWN XXLG REINFORCED 9071EL

## (undated) DEVICE — BLADE SAW SAGITTAL STRK 19.5X95X1.27MM 2108-109-000S15

## (undated) DEVICE — MANIFOLD NEPTUNE 4 PORT 700-20

## (undated) DEVICE — LINEN TOWEL PACK X5 5464

## (undated) DEVICE — DRAPE IOBAN ISOLATION VERTICAL 320X21CM 6617

## (undated) DEVICE — SU ETHIBOND 2 V-37 4X30" MX69G

## (undated) DEVICE — GLOVE PROTEXIS BLUE W/NEU-THERA 7.0  2D73EB70

## (undated) DEVICE — ESU BIPOLAR SEALER AQUAMANTYS 6MM 23-112-1

## (undated) DEVICE — DRAPE SHEET REV FOLD 3/4 9349

## (undated) DEVICE — GLOVE PROTEXIS W/NEU-THERA 6.5  2D73TE65

## (undated) DEVICE — PREP CHLORAPREP 26ML TINTED ORANGE  260815

## (undated) DEVICE — ESU GROUND PAD UNIVERSAL W/O CORD

## (undated) DEVICE — SOL WATER IRRIG 1000ML BOTTLE 2F7114

## (undated) DEVICE — BLADE SAW OSCILLATING STRYK MED 9.0X25X0.38MM 2296-003-111

## (undated) DEVICE — DRSG ABDOMINAL 07 1/2X8" 7197D

## (undated) DEVICE — SOL BENZOIN 0.5OZ

## (undated) DEVICE — SU ETHILON 3-0 FS-1 18" 669H

## (undated) DEVICE — SOL NACL 0.9% IRRIG 1000ML BOTTLE 2F7124

## (undated) DEVICE — GLOVE PROTEXIS W/NEU-THERA 7.5  2D73TE75

## (undated) DEVICE — CAST PADDING 4" UNSTERILE 9044

## (undated) DEVICE — SUCTION TIP YANKAUER STR K87

## (undated) DEVICE — BLADE KNIFE SURG 10 371110

## (undated) DEVICE — SUCTION CANISTER MEDIVAC LINER 3000ML W/LID 65651-530

## (undated) DEVICE — PACK EXTREMITY SOP15EXFSD

## (undated) DEVICE — SU VICRYL 0 CTX CR 8X18" J764D

## (undated) DEVICE — Device

## (undated) DEVICE — DRAPE IOBAN INCISE 23X17" 6650EZ

## (undated) DEVICE — CATH TRAY FOLEY COUDE SURESTEP 16FR W/URNE MTR STLK A304716A

## (undated) DEVICE — GLOVE PROTEXIS POWDER FREE 8.5 ORTHOPEDIC 2D73ET85

## (undated) DEVICE — IMM LIMB ELEVATOR DC40-0203

## (undated) DEVICE — DRAPE C-ARM 60X42" 1013

## (undated) DEVICE — DRAPE COVER C-ARM SEAMLESS SNAP-KAP 03-KP26 LATEX FREE

## (undated) DEVICE — GLOVE PROTEXIS MICRO 8.5  2D73PM85

## (undated) DEVICE — DRAPE CONVERTORS U-DRAPE 60X72" 8476

## (undated) DEVICE — HOOD FLYTE W/PEELAWAY 408-800-100

## (undated) DEVICE — SU STRATAFIX PDS PLUS 0 CT-1 30CM SXPP1B450

## (undated) DEVICE — GLOVE PROTEXIS MICRO 6.5  2D73PM65

## (undated) DEVICE — SUCTION IRR SYSTEM W/O TIP INTERPULSE HANDPIECE 0210-100-000

## (undated) DEVICE — KIT PATIENT CARE HANA TABLE PROFX SUPINE 6855

## (undated) RX ORDER — KETOROLAC TROMETHAMINE 30 MG/ML
INJECTION, SOLUTION INTRAMUSCULAR; INTRAVENOUS
Status: DISPENSED
Start: 2019-10-15

## (undated) RX ORDER — CEFAZOLIN SODIUM 2 G/100ML
INJECTION, SOLUTION INTRAVENOUS
Status: DISPENSED
Start: 2019-09-17

## (undated) RX ORDER — EPINEPHRINE 1 MG/ML
INJECTION, SOLUTION INTRAMUSCULAR; SUBCUTANEOUS
Status: DISPENSED
Start: 2019-09-17

## (undated) RX ORDER — ACETAMINOPHEN 500 MG
TABLET ORAL
Status: DISPENSED
Start: 2019-05-13

## (undated) RX ORDER — PROPOFOL 10 MG/ML
INJECTION, EMULSION INTRAVENOUS
Status: DISPENSED
Start: 2019-10-15

## (undated) RX ORDER — CELECOXIB 200 MG/1
CAPSULE ORAL
Status: DISPENSED
Start: 2019-09-17

## (undated) RX ORDER — FENTANYL CITRATE 50 UG/ML
INJECTION, SOLUTION INTRAMUSCULAR; INTRAVENOUS
Status: DISPENSED
Start: 2019-10-15

## (undated) RX ORDER — BUPIVACAINE HYDROCHLORIDE 5 MG/ML
INJECTION, SOLUTION EPIDURAL; INTRACAUDAL
Status: DISPENSED
Start: 2019-09-17

## (undated) RX ORDER — ACYCLOVIR 200 MG/1
CAPSULE ORAL
Status: DISPENSED
Start: 2019-10-15

## (undated) RX ORDER — CEFAZOLIN SODIUM IN 0.9 % NACL 3 G/100 ML
INTRAVENOUS SOLUTION, PIGGYBACK (ML) INTRAVENOUS
Status: DISPENSED
Start: 2019-05-13

## (undated) RX ORDER — ACYCLOVIR 200 MG/1
CAPSULE ORAL
Status: DISPENSED
Start: 2019-09-17

## (undated) RX ORDER — ACETAMINOPHEN 500 MG
TABLET ORAL
Status: DISPENSED
Start: 2019-09-17

## (undated) RX ORDER — CEFAZOLIN SODIUM IN 0.9 % NACL 3 G/100 ML
INTRAVENOUS SOLUTION, PIGGYBACK (ML) INTRAVENOUS
Status: DISPENSED
Start: 2019-09-17

## (undated) RX ORDER — HYDROMORPHONE HYDROCHLORIDE 1 MG/ML
INJECTION, SOLUTION INTRAMUSCULAR; INTRAVENOUS; SUBCUTANEOUS
Status: DISPENSED
Start: 2019-10-15

## (undated) RX ORDER — KETOROLAC TROMETHAMINE 30 MG/ML
INJECTION, SOLUTION INTRAMUSCULAR; INTRAVENOUS
Status: DISPENSED
Start: 2019-09-17

## (undated) RX ORDER — VANCOMYCIN HYDROCHLORIDE 1 G/20ML
INJECTION, POWDER, LYOPHILIZED, FOR SOLUTION INTRAVENOUS
Status: DISPENSED
Start: 2019-10-15

## (undated) RX ORDER — PREGABALIN 150 MG/1
CAPSULE ORAL
Status: DISPENSED
Start: 2019-09-17

## (undated) RX ORDER — CEFAZOLIN SODIUM 1 G/3ML
INJECTION, POWDER, FOR SOLUTION INTRAMUSCULAR; INTRAVENOUS
Status: DISPENSED
Start: 2019-10-15

## (undated) RX ORDER — EPINEPHRINE 1 MG/ML
INJECTION, SOLUTION INTRAMUSCULAR; SUBCUTANEOUS
Status: DISPENSED
Start: 2019-10-15

## (undated) RX ORDER — FENTANYL CITRATE 50 UG/ML
INJECTION, SOLUTION INTRAMUSCULAR; INTRAVENOUS
Status: DISPENSED
Start: 2019-05-13

## (undated) RX ORDER — BUPIVACAINE HYDROCHLORIDE 5 MG/ML
INJECTION, SOLUTION EPIDURAL; INTRACAUDAL
Status: DISPENSED
Start: 2019-10-15

## (undated) RX ORDER — PREGABALIN 150 MG/1
CAPSULE ORAL
Status: DISPENSED
Start: 2019-10-15

## (undated) RX ORDER — CEFAZOLIN SODIUM IN 0.9 % NACL 3 G/100 ML
INTRAVENOUS SOLUTION, PIGGYBACK (ML) INTRAVENOUS
Status: DISPENSED
Start: 2019-10-15

## (undated) RX ORDER — VANCOMYCIN HYDROCHLORIDE 1 G/20ML
INJECTION, POWDER, LYOPHILIZED, FOR SOLUTION INTRAVENOUS
Status: DISPENSED
Start: 2019-09-17

## (undated) RX ORDER — ACETAMINOPHEN 500 MG
TABLET ORAL
Status: DISPENSED
Start: 2019-10-15